# Patient Record
Sex: FEMALE | Race: WHITE | NOT HISPANIC OR LATINO | Employment: OTHER | URBAN - METROPOLITAN AREA
[De-identification: names, ages, dates, MRNs, and addresses within clinical notes are randomized per-mention and may not be internally consistent; named-entity substitution may affect disease eponyms.]

---

## 2019-11-08 ENCOUNTER — LAB VISIT (OUTPATIENT)
Dept: LAB | Facility: HOSPITAL | Age: 84
End: 2019-11-08
Attending: INTERNAL MEDICINE
Payer: MEDICARE

## 2019-11-08 DIAGNOSIS — E03.9 PRIMARY HYPOTHYROIDISM: Primary | ICD-10-CM

## 2019-11-08 DIAGNOSIS — R73.01 IMPAIRED FASTING GLUCOSE: ICD-10-CM

## 2019-11-08 DIAGNOSIS — Z79.899 ENCOUNTER FOR LONG-TERM (CURRENT) USE OF OTHER MEDICATIONS: ICD-10-CM

## 2019-11-08 DIAGNOSIS — M25.50 PAIN IN JOINT, MULTIPLE SITES: ICD-10-CM

## 2019-11-08 DIAGNOSIS — R53.83 FATIGUE: ICD-10-CM

## 2019-11-08 DIAGNOSIS — E78.5 HYPERLIPEMIA: ICD-10-CM

## 2019-11-08 LAB
ALBUMIN SERPL BCP-MCNC: 4.2 G/DL (ref 3.5–5.2)
ALP SERPL-CCNC: 53 U/L (ref 55–135)
ALT SERPL W/O P-5'-P-CCNC: 16 U/L (ref 10–44)
ANION GAP SERPL CALC-SCNC: 11 MMOL/L (ref 8–16)
AST SERPL-CCNC: 22 U/L (ref 10–40)
BASOPHILS # BLD AUTO: 0.04 K/UL (ref 0–0.2)
BASOPHILS NFR BLD: 0.8 % (ref 0–1.9)
BILIRUB SERPL-MCNC: 0.9 MG/DL (ref 0.1–1)
BUN SERPL-MCNC: 22 MG/DL (ref 8–23)
CALCIUM SERPL-MCNC: 9.2 MG/DL (ref 8.7–10.5)
CHLORIDE SERPL-SCNC: 102 MMOL/L (ref 95–110)
CHOLEST SERPL-MCNC: 176 MG/DL (ref 120–199)
CHOLEST/HDLC SERPL: 3 {RATIO} (ref 2–5)
CO2 SERPL-SCNC: 24 MMOL/L (ref 23–29)
CREAT SERPL-MCNC: 1.3 MG/DL (ref 0.5–1.4)
DIFFERENTIAL METHOD: ABNORMAL
EOSINOPHIL # BLD AUTO: 0.1 K/UL (ref 0–0.5)
EOSINOPHIL NFR BLD: 1.3 % (ref 0–8)
ERYTHROCYTE [DISTWIDTH] IN BLOOD BY AUTOMATED COUNT: 12.8 % (ref 11.5–14.5)
EST. GFR  (AFRICAN AMERICAN): 43.8 ML/MIN/1.73 M^2
EST. GFR  (NON AFRICAN AMERICAN): 38 ML/MIN/1.73 M^2
ESTIMATED AVG GLUCOSE: 111 MG/DL (ref 68–131)
GLUCOSE SERPL-MCNC: 98 MG/DL (ref 70–110)
HBA1C MFR BLD HPLC: 5.5 % (ref 4.5–6.2)
HCT VFR BLD AUTO: 35.3 % (ref 37–48.5)
HDLC SERPL-MCNC: 58 MG/DL (ref 40–75)
HDLC SERPL: 33 % (ref 20–50)
HGB BLD-MCNC: 11.7 G/DL (ref 12–16)
IMM GRANULOCYTES # BLD AUTO: 0.02 K/UL (ref 0–0.04)
IMM GRANULOCYTES NFR BLD AUTO: 0.4 % (ref 0–0.5)
LDLC SERPL CALC-MCNC: 90.6 MG/DL (ref 63–159)
LYMPHOCYTES # BLD AUTO: 1.5 K/UL (ref 1–4.8)
LYMPHOCYTES NFR BLD: 32.6 % (ref 18–48)
MCH RBC QN AUTO: 32.1 PG (ref 27–31)
MCHC RBC AUTO-ENTMCNC: 33.1 G/DL (ref 32–36)
MCV RBC AUTO: 97 FL (ref 82–98)
MONOCYTES # BLD AUTO: 0.5 K/UL (ref 0.3–1)
MONOCYTES NFR BLD: 11 % (ref 4–15)
NEUTROPHILS # BLD AUTO: 2.6 K/UL (ref 1.8–7.7)
NEUTROPHILS NFR BLD: 53.9 % (ref 38–73)
NONHDLC SERPL-MCNC: 118 MG/DL
NRBC BLD-RTO: 0 /100 WBC
PLATELET # BLD AUTO: 253 K/UL (ref 150–350)
PMV BLD AUTO: 9.1 FL (ref 9.2–12.9)
POTASSIUM SERPL-SCNC: 4 MMOL/L (ref 3.5–5.1)
PROT SERPL-MCNC: 7.1 G/DL (ref 6–8.4)
RBC # BLD AUTO: 3.65 M/UL (ref 4–5.4)
SODIUM SERPL-SCNC: 137 MMOL/L (ref 136–145)
T4 FREE SERPL-MCNC: 1.78 NG/DL (ref 0.71–1.51)
TRIGL SERPL-MCNC: 137 MG/DL (ref 30–150)
TSH SERPL DL<=0.005 MIU/L-ACNC: 4.91 UIU/ML (ref 0.34–5.6)
WBC # BLD AUTO: 4.73 K/UL (ref 3.9–12.7)

## 2019-11-08 PROCEDURE — 83036 HEMOGLOBIN GLYCOSYLATED A1C: CPT

## 2019-11-08 PROCEDURE — 84443 ASSAY THYROID STIM HORMONE: CPT

## 2019-11-08 PROCEDURE — 36415 COLL VENOUS BLD VENIPUNCTURE: CPT

## 2019-11-08 PROCEDURE — 84439 ASSAY OF FREE THYROXINE: CPT

## 2019-11-08 PROCEDURE — 80061 LIPID PANEL: CPT

## 2019-11-08 PROCEDURE — 80053 COMPREHEN METABOLIC PANEL: CPT

## 2019-11-08 PROCEDURE — 85025 COMPLETE CBC W/AUTO DIFF WBC: CPT

## 2021-01-15 ENCOUNTER — IMMUNIZATION (OUTPATIENT)
Dept: FAMILY MEDICINE | Facility: CLINIC | Age: 86
End: 2021-01-15
Payer: MEDICARE

## 2021-01-15 DIAGNOSIS — Z23 NEED FOR VACCINATION: Primary | ICD-10-CM

## 2021-01-15 PROCEDURE — 0011A COVID-19, MRNA, LNP-S, PF, 100 MCG/0.5 ML DOSE VACCINE: CPT | Mod: ,,, | Performed by: FAMILY MEDICINE

## 2021-01-15 PROCEDURE — 91301 COVID-19, MRNA, LNP-S, PF, 100 MCG/0.5 ML DOSE VACCINE: CPT | Mod: ,,, | Performed by: FAMILY MEDICINE

## 2021-01-15 PROCEDURE — 91301 COVID-19, MRNA, LNP-S, PF, 100 MCG/0.5 ML DOSE VACCINE: ICD-10-PCS | Mod: ,,, | Performed by: FAMILY MEDICINE

## 2021-01-15 PROCEDURE — 0011A COVID-19, MRNA, LNP-S, PF, 100 MCG/0.5 ML DOSE VACCINE: ICD-10-PCS | Mod: ,,, | Performed by: FAMILY MEDICINE

## 2021-02-12 ENCOUNTER — IMMUNIZATION (OUTPATIENT)
Dept: FAMILY MEDICINE | Facility: CLINIC | Age: 86
End: 2021-02-12
Payer: MEDICARE

## 2021-02-12 DIAGNOSIS — Z23 NEED FOR VACCINATION: Primary | ICD-10-CM

## 2021-02-12 PROCEDURE — 0012A COVID-19, MRNA, LNP-S, PF, 100 MCG/0.5 ML DOSE VACCINE: ICD-10-PCS | Mod: CV19,S$GLB,, | Performed by: FAMILY MEDICINE

## 2021-02-12 PROCEDURE — 91301 COVID-19, MRNA, LNP-S, PF, 100 MCG/0.5 ML DOSE VACCINE: CPT | Mod: S$GLB,,, | Performed by: FAMILY MEDICINE

## 2021-02-12 PROCEDURE — 0012A COVID-19, MRNA, LNP-S, PF, 100 MCG/0.5 ML DOSE VACCINE: CPT | Mod: CV19,S$GLB,, | Performed by: FAMILY MEDICINE

## 2021-02-12 PROCEDURE — 91301 COVID-19, MRNA, LNP-S, PF, 100 MCG/0.5 ML DOSE VACCINE: ICD-10-PCS | Mod: S$GLB,,, | Performed by: FAMILY MEDICINE

## 2023-12-21 ENCOUNTER — TELEPHONE (OUTPATIENT)
Dept: PODIATRY | Facility: CLINIC | Age: 88
End: 2023-12-21
Payer: MEDICARE

## 2023-12-21 ENCOUNTER — OFFICE VISIT (OUTPATIENT)
Dept: PODIATRY | Facility: CLINIC | Age: 88
End: 2023-12-21
Payer: MEDICARE

## 2023-12-21 VITALS
DIASTOLIC BLOOD PRESSURE: 99 MMHG | HEART RATE: 78 BPM | HEIGHT: 66 IN | SYSTOLIC BLOOD PRESSURE: 159 MMHG | WEIGHT: 136 LBS | BODY MASS INDEX: 21.86 KG/M2

## 2023-12-21 DIAGNOSIS — L03.032 CELLULITIS OF TOE OF LEFT FOOT: Primary | ICD-10-CM

## 2023-12-21 DIAGNOSIS — M67.40 GANGLION: ICD-10-CM

## 2023-12-21 PROCEDURE — 99213 OFFICE O/P EST LOW 20 MIN: CPT | Mod: PBBFAC,PN | Performed by: PODIATRIST

## 2023-12-21 PROCEDURE — 99999 PR PBB SHADOW E&M-EST. PATIENT-LVL III: CPT | Mod: PBBFAC,,, | Performed by: PODIATRIST

## 2023-12-21 PROCEDURE — 99203 PR OFFICE/OUTPT VISIT, NEW, LEVL III, 30-44 MIN: ICD-10-PCS | Mod: S$PBB,,, | Performed by: PODIATRIST

## 2023-12-21 PROCEDURE — 99203 OFFICE O/P NEW LOW 30 MIN: CPT | Mod: S$PBB,,, | Performed by: PODIATRIST

## 2023-12-21 PROCEDURE — 99999 PR PBB SHADOW E&M-EST. PATIENT-LVL III: ICD-10-PCS | Mod: PBBFAC,,, | Performed by: PODIATRIST

## 2023-12-21 RX ORDER — WARFARIN SODIUM 5 MG/1
5 TABLET ORAL
COMMUNITY
Start: 2023-07-26

## 2023-12-21 RX ORDER — GABAPENTIN 300 MG/1
300 CAPSULE ORAL
COMMUNITY
Start: 2023-10-24

## 2023-12-21 RX ORDER — SULFAMETHOXAZOLE AND TRIMETHOPRIM 800; 160 MG/1; MG/1
1 TABLET ORAL DAILY
Qty: 10 TABLET | Refills: 0 | Status: SHIPPED | OUTPATIENT
Start: 2023-12-21 | End: 2023-12-31

## 2023-12-21 RX ORDER — LEVOTHYROXINE SODIUM 75 UG/1
75 TABLET ORAL
COMMUNITY

## 2023-12-21 RX ORDER — VALSARTAN AND HYDROCHLOROTHIAZIDE 320; 25 MG/1; MG/1
TABLET, FILM COATED ORAL
COMMUNITY
Start: 2023-11-27

## 2023-12-21 RX ORDER — METOPROLOL SUCCINATE 100 MG/1
100 TABLET, EXTENDED RELEASE ORAL 2 TIMES DAILY
COMMUNITY
Start: 2023-12-05

## 2023-12-21 RX ORDER — CLONIDINE HYDROCHLORIDE 0.1 MG/1
0.1 TABLET ORAL 2 TIMES DAILY
COMMUNITY

## 2023-12-21 NOTE — TELEPHONE ENCOUNTER
[facility] pharmacy calling to make sure you are aware bactrim's interaction with patient's warfarin.

## 2023-12-21 NOTE — TELEPHONE ENCOUNTER
M for prescribers at [facility] stating that Dr. Byrd is aware patient is on both medications and that she still needs to take her antibiotic.    Spoke with patient and informed her she needs to take antibiotic and that we communicated this with [facility].

## 2023-12-25 NOTE — PROGRESS NOTES
Subjective:       Patient ID: Sola Pitt is a 88 y.o. female.    Chief Complaint: Swelling (Left foot 2nd toe), Lump (Left foot, 2nd toe), and Rash (Right foot )    Patient presents today with a red swollen 2nd digit on the left foot she states she also has a lump on her foot that is been there for an extended period of time and has not significantly bothered her.  Patient also has a rash on the top of her right foot from shoes that rubbed and irritated the top of her foot.  Past Medical History:   Diagnosis Date    Atrial fibrillation     Cellulitis     Hypertension     Thyroid disease      History reviewed. No pertinent surgical history.  History reviewed. No pertinent family history.  Social History     Socioeconomic History    Marital status:    Tobacco Use    Smoking status: Never     Passive exposure: Never    Smokeless tobacco: Never   Substance and Sexual Activity    Alcohol use: Yes    Drug use: Never    Sexual activity: Not Currently       Current Outpatient Medications   Medication Sig Dispense Refill    cloNIDine (CATAPRES) 0.1 MG tablet Take 0.1 mg by mouth 2 (two) times daily.      gabapentin (NEURONTIN) 300 MG capsule 300 mg.      levothyroxine (SYNTHROID) 75 MCG tablet Take 75 mcg by mouth.      metoprolol succinate (TOPROL-XL) 100 MG 24 hr tablet Take 100 mg by mouth 2 (two) times daily.      valsartan-hydrochlorothiazide (DIOVAN-HCT) 320-25 mg per tablet 1 tab, Oral, Daily, # 90 tab, 3 Refill(s), Pharmacy: [id number], 167, cm, 11/06/23 10:35:00 CST, Height/Length Measured, 71.3, kg, 11/06/23 10:35:00 CST, Weight Dosing      warfarin (COUMADIN) 5 MG tablet Take 5 mg by mouth.      sulfamethoxazole-trimethoprim 800-160mg (BACTRIM DS) 800-160 mg Tab Take 1 tablet by mouth once daily. for 10 days 10 tablet 0     No current facility-administered medications for this visit.     Review of patient's allergies indicates:  No Known Allergies    Review of Systems   Musculoskeletal:   "Positive for arthralgias and joint swelling.   Skin:  Positive for color change and rash.   All other systems reviewed and are negative.      Objective:      Vitals:    12/21/23 1405   BP: (!) 159/99   BP Location: Right arm   Patient Position: Sitting   Pulse: 78   Weight: 61.7 kg (136 lb)   Height: 5' 6" (1.676 m)     Physical Exam  Vitals and nursing note reviewed.   Constitutional:       Appearance: Normal appearance.   Cardiovascular:      Pulses:           Dorsalis pedis pulses are 1+ on the right side and 1+ on the left side.        Posterior tibial pulses are 1+ on the right side and 1+ on the left side.   Pulmonary:      Effort: Pulmonary effort is normal.   Musculoskeletal:         General: Swelling, tenderness and deformity present.      Left foot: Decreased range of motion. Deformity present.        Feet:    Feet:      Right foot:      Protective Sensation: 2 sites tested.  2 sites sensed.      Skin integrity: Erythema present.      Left foot:      Protective Sensation: 2 sites tested.  2 sites sensed.      Skin integrity: Erythema and warmth present.   Skin:     Capillary Refill: Capillary refill takes more than 3 seconds.      Findings: Erythema present.   Neurological:      General: No focal deficit present.      Mental Status: She is alert.   Psychiatric:         Mood and Affect: Mood normal.         Behavior: Behavior normal.                                Assessment:       1. Cellulitis of toe of left foot    2. Ganglion        Plan:        Patient presents today with a red swollen 2nd digit on the left foot she states she also has a lump on her foot that is been there for an extended period of time and has not significantly bothered her.  Patient also has a rash on the top of her right foot from shoes that rubbed and irritated the top of her foot.  A comprehensive new patient evaluation was performed patient does have an abrasion on the top of her right foot this is something that I want her to " monitor this was caused by a shoe rubbing and irritating the area obviously she needs to avoid anything that rubs or irritates the area to allow this to heal completely.  I evaluated the patient's left foot she does have significant cellulitis of the 2nd digit with associated erythema edema there has a small ganglionic cyst on the dorsal aspect of the 2nd digit left the patient indicates this has been here for an extended period of time there are no skin breaks no signs of ulceration noted involving the 2nd digit however I advised the patient it is difficult to determine whether not this is inflammatory or infectious I advised the patient to be cautious I am going to put her on a renal dose of Bactrim to get this settled down she needs to make sure she keeps the 2nd digit protected prevent anything from rubbing or irritating it I am going to see her for follow-up in 2 weeks and would recommend ice and elevation of the left foot.  Patient advised if for any reason this gets worse she is to contact us immediately.  Patient was in understanding and agreement she agreed to proceed conservatively with a dressing the problem on the left foot.This note was created using [employer] voice recognition software that occasionally misinterpreted phrases or words.

## 2024-01-04 ENCOUNTER — OFFICE VISIT (OUTPATIENT)
Dept: PODIATRY | Facility: CLINIC | Age: 89
End: 2024-01-04
Payer: MEDICARE

## 2024-01-04 VITALS
BODY MASS INDEX: 21.86 KG/M2 | WEIGHT: 136 LBS | SYSTOLIC BLOOD PRESSURE: 128 MMHG | HEIGHT: 66 IN | DIASTOLIC BLOOD PRESSURE: 81 MMHG | HEART RATE: 63 BPM

## 2024-01-04 DIAGNOSIS — M67.40 GANGLION: ICD-10-CM

## 2024-01-04 DIAGNOSIS — L03.032 CELLULITIS OF TOE OF LEFT FOOT: Primary | ICD-10-CM

## 2024-01-04 PROCEDURE — 99213 OFFICE O/P EST LOW 20 MIN: CPT | Mod: S$PBB,,, | Performed by: PODIATRIST

## 2024-01-04 PROCEDURE — 99213 OFFICE O/P EST LOW 20 MIN: CPT | Mod: PBBFAC,PN | Performed by: PODIATRIST

## 2024-01-04 PROCEDURE — 99999 PR PBB SHADOW E&M-EST. PATIENT-LVL III: CPT | Mod: PBBFAC,,, | Performed by: PODIATRIST

## 2024-01-07 NOTE — PROGRESS NOTES
"Subjective:       Patient ID: Sola Pitt is a 88 y.o. female.    Chief Complaint: Cellulitis (Left foot /) and Follow-up    Patient presents today for follow-up of a red swollen 2nd digit on the left foot   Past Medical History:   Diagnosis Date    Atrial fibrillation     Cellulitis     Hypertension     Thyroid disease      History reviewed. No pertinent surgical history.  History reviewed. No pertinent family history.  Social History     Socioeconomic History    Marital status:    Tobacco Use    Smoking status: Never     Passive exposure: Never    Smokeless tobacco: Never   Substance and Sexual Activity    Alcohol use: Yes    Drug use: Never    Sexual activity: Not Currently       Current Outpatient Medications   Medication Sig Dispense Refill    cloNIDine (CATAPRES) 0.1 MG tablet Take 0.1 mg by mouth 2 (two) times daily.      gabapentin (NEURONTIN) 300 MG capsule 300 mg.      levothyroxine (SYNTHROID) 75 MCG tablet Take 75 mcg by mouth.      metoprolol succinate (TOPROL-XL) 100 MG 24 hr tablet Take 100 mg by mouth 2 (two) times daily.      valsartan-hydrochlorothiazide (DIOVAN-HCT) 320-25 mg per tablet 1 tab, Oral, Daily, # 90 tab, 3 Refill(s), Pharmacy: [id number], 167, cm, 11/06/23 10:35:00 CST, Height/Length Measured, 71.3, kg, 11/06/23 10:35:00 CST, Weight Dosing      warfarin (COUMADIN) 5 MG tablet Take 5 mg by mouth.       No current facility-administered medications for this visit.     Review of patient's allergies indicates:  No Known Allergies    Review of Systems   Musculoskeletal:  Positive for arthralgias and joint swelling.   Skin:  Positive for color change and rash.   All other systems reviewed and are negative.      Objective:      Vitals:    01/04/24 0934   BP: 128/81   BP Location: Right arm   Patient Position: Sitting   Pulse: 63   Weight: 61.7 kg (136 lb)   Height: 5' 6" (1.676 m)     Physical Exam  Vitals and nursing note reviewed.   Constitutional:       Appearance: " Normal appearance.   Cardiovascular:      Pulses:           Dorsalis pedis pulses are 1+ on the right side and 1+ on the left side.        Posterior tibial pulses are 1+ on the right side and 1+ on the left side.   Pulmonary:      Effort: Pulmonary effort is normal.   Musculoskeletal:         General: Swelling, tenderness and deformity present.      Left foot: Decreased range of motion. Deformity present.        Feet:    Feet:      Right foot:      Protective Sensation: 2 sites tested.  2 sites sensed.      Skin integrity: Erythema present.      Left foot:      Protective Sensation: 2 sites tested.  2 sites sensed.      Skin integrity: Erythema and warmth present.   Skin:     Capillary Refill: Capillary refill takes more than 3 seconds.      Findings: Erythema present.   Neurological:      General: No focal deficit present.      Mental Status: She is alert.   Psychiatric:         Mood and Affect: Mood normal.         Behavior: Behavior normal.                                          Assessment:       1. Cellulitis of toe of left foot    2. Ganglion        Plan:       Patient presents today for follow-up of a red swollen 2nd digit on the left foot.  Patient states within 4-5 days of her last appointment and starting the Bactrim the areas started to improve it is now completely resolved there has no erythema no edema no discomfort in the 2nd digit left.  Patient still has a small ganglionic cyst on the dorsal distal interphalangeal joint 2nd digit left but the patient states this has been there for years has never bothered her certainly she obviously had signs of infection but this has now subsequently resolved I did advised the patient any problems questions concerns or recurrence she should contact us immediately otherwise I will plan to see her as needed for follow-up.  This note was created using [employer] voice recognition software that occasionally misinterpreted phrases or words.

## 2024-03-07 DIAGNOSIS — S72.142A CLOSED INTERTROCHANTERIC FRACTURE OF LEFT FEMUR: ICD-10-CM

## 2024-03-07 DIAGNOSIS — S72.142D CLOSED DISPLACED INTERTROCHANTERIC FRACTURE OF LEFT FEMUR WITH ROUTINE HEALING: Primary | ICD-10-CM

## 2024-03-07 DIAGNOSIS — S72.142A CLOSED INTERTROCHANTERIC FRACTURE OF LEFT FEMUR: Primary | ICD-10-CM

## 2024-03-07 DIAGNOSIS — S72.142D CLOSED DISPLACED INTERTROCHANTERIC FRACTURE OF LEFT FEMUR WITH ROUTINE HEALING: ICD-10-CM

## 2024-03-13 ENCOUNTER — CLINICAL SUPPORT (OUTPATIENT)
Dept: REHABILITATION | Facility: HOSPITAL | Age: 89
End: 2024-03-13
Payer: MEDICARE

## 2024-03-13 DIAGNOSIS — S72.142D CLOSED DISPLACED INTERTROCHANTERIC FRACTURE OF LEFT FEMUR WITH ROUTINE HEALING, SUBSEQUENT ENCOUNTER: Primary | ICD-10-CM

## 2024-03-13 DIAGNOSIS — R29.898 DECREASED STRENGTH OF LOWER EXTREMITY: ICD-10-CM

## 2024-03-13 DIAGNOSIS — M25.652 DECREASED RANGE OF LEFT HIP MOVEMENT: ICD-10-CM

## 2024-03-13 DIAGNOSIS — S72.142A CLOSED DISPLACED INTERTROCHANTERIC FRACTURE OF LEFT FEMUR, INITIAL ENCOUNTER: ICD-10-CM

## 2024-03-13 DIAGNOSIS — S72.142D CLOSED DISPLACED INTERTROCHANTERIC FRACTURE OF LEFT FEMUR WITH ROUTINE HEALING: Primary | ICD-10-CM

## 2024-03-13 PROCEDURE — 97110 THERAPEUTIC EXERCISES: CPT | Mod: PN

## 2024-03-13 PROCEDURE — 97165 OT EVAL LOW COMPLEX 30 MIN: CPT | Mod: PN

## 2024-03-13 PROCEDURE — 97161 PT EVAL LOW COMPLEX 20 MIN: CPT | Mod: PN

## 2024-03-13 NOTE — PLAN OF CARE
[facility]  Physical Therapy Initial Evaluation    Name: Pedrito Pitt  Clinic Number: [id number]    Therapy Diagnosis:   Encounter Diagnoses   Name Primary?    Closed displaced intertrochanteric fracture of left femur with routine healing Yes    Decreased strength of lower extremity     Decreased range of left hip movement     Closed displaced intertrochanteric fracture of left femur, initial encounter      Physician: Kit Vasquez MD    Physician Orders: PT Eval and Treat   Medical Diagnosis from Referral:   S72.142A (ICD-10-CM) - Closed intertrochanteric fracture of left femur   S72.142D (ICD-10-CM) - Closed displaced intertrochanteric fracture of left femur with routine healing     Evaluation Date: 3/13/2024  Authorization Period Expiration: 3/7/25  Plan of Care Expiration: 5/10/24  Visit # / Visits authorized: 1/ 1  FOTO: 1/10    Time In: 2:00  Time Out: 2:50  Total Billable Time: 50 minutes (low Complexity Evaluation, Therapeutic Exercise - 1)    Precautions: Standard, Fall    Subjective   Date of onset: 2/24/24  History of current condition - PEDRITO reports: s/p left hip ORIF displaced intertrochanteric fracture after falling out of a chair at the [employer]. Course complicated by acute renal insufficiency which improved with IV. Participated in rehab at [facility] discharged last Saturday. WBAT with rolling walker. She also has a wheelchair, raised toilet and shower seat. Sleeping fairly good in a bed. Today she is hurting more. She has left knee and lateral hip pain. Pain with transitional movements, walking, dressing. Patient continues to have bruising. On coumadin.      Medical History:   Past Medical History:   Diagnosis Date    Atrial fibrillation     Cellulitis     Hypertension     Thyroid disease        Surgical History:   Pedrito Pitt  has no past surgical history on file.    Medications:   Pedrito has a current medication list which includes the following  prescription(s): clonidine, gabapentin, levothyroxine, metoprolol succinate, valsartan-hydrochlorothiazide, and warfarin.    Allergies:   Review of patient's allergies indicates:  No Known Allergies     Imaging, : not available (outside source)    Prior Therapy: rehab facility PT/OT   Social History:  lives with their daughter currently, lives in [place], does have stairs and step down into living room in [place] home  Occupation: not working  Prior Level of Function: independent with ALDs no assistive device, social activities like bridge   Current Level of Function: requires min assistance     Pain:   Current 6/10, worst 8/10, best 4/10   Location: left hip   Description: Sharp  Aggravating Factors: Standing, Laying, Walking, and Getting out of bed/chair  Easing Factors: pain medication    Pts goals: be able to drive and return to [place]    Objective     Posture: slight trunk flexion, decreased knee extension   Palpation: moderate bruising on lower extremity, swelling in lower leg   Sensation: normal    Range of Motion/Strength:     Hip Right Left Pain/Dysfunction with Movement   AROM/PROM      flexion  95  60 supine/ 90 seated     extension  Not tested   Not tested     abduction  50  30    Internal rotation  40  40    External rotation  40  40      Knee Right Left Pain/Dysfunction with Movement   AROM/PROM      flexion  128  90 Left knee crepitus    extension  5  16/10        L/E MMT Right Left Pain/Dysfunction with Movement   Hip Flexion 5/5 3/5    Hip Extension 3/5 3/5  Able to perform sit to stand with use of hands    Hip Abduction 4+/5 3-/5    Hip Adduction 5/5 4+/5    Hip ER 4+/5 3/5    Knee Flexion 4+/5 4/5    Knee Extension 5/5 4/5 Left knee pop   Ankle DF 5/5 5/5    Ankle PF 4+/5 4+/5        Joint Mobility:   Hip: not tested   Knee: decreased patellar mobility all directions left       Flexibility: 30 deg straight leg raise left, within normal limits right     Gait Analysis:With AD.  Device  Used -  Rolling walker Assistance ind Deviations: limited hip extension and step length, decreased hip flexion and knee flexion    TU.5  seconds rolling walker   TUG Cutoff Scores:13.5          CMS Impairment/Limitation/Restriction for FOTO hip Survey    Therapist reviewed FOTO scores for Pedrito Pitt on 3/13/2024.   FOTO documents entered into [employer] - see Media section.    Limitation Score: 64%  Category: Mobility         TREATMENT   Treatment Time In: 2:40  Treatment Time Out: 2:50  Total Treatment time separate from Evaluation: 10 minutes    PEDRITO received therapeutic exercises to develop strength, endurance, ROM, flexibility, posture, and core stabilization for 10 minutes including:  Quad set  Heel slides  Hamstring stretch  Heel raises  Seated/standing marches    Home Exercises Provided and Patient Education Provided     Education provided:   Anatomy and Pathology.  Symptom management and plan of care progressions.  Home Exercise Program.  Provided thigh high compression stockings     Written Home Exercises Provided: Patient instructed to cont prior HEP.  Exercises were reviewed and PEDRITO was able to demonstrate them prior to the end of the session.  PEDRITO demonstrated good  understanding of the education provided.     See EMR under Patient Instructions for exercises provided prior visit.      Assessment   Pedrito is a 88 y.o. female referred to outpatient Physical Therapy with a medical diagnosis of Closed displaced intertrochanteric fracture of left femur with routine healing. Pt presents 2 weeks 4 days s/p left  hip ORIF ambulating with rolling walker with decreased left hip and knee range of motion, decreased lower extremity strength, impaired balance and gait, and decreased functional mobility. Additional signs and symptoms of left knee osteoarthritis. These impairments impact pts ability to stand, walk, and perform independent activities of daily living as well as stair ambulation  required in her home in [place]. She will benefit from skilled PT to address the above impairments to improve her functional mobility for goal of independent return home at prior level of function. Patient demonstrates good motivation to achieve goals.     Pt prognosis is Good.   Pt will benefit from skilled outpatient Physical Therapy to address the deficits stated above and in the chart below, provide pt/family education, and to maximize pt's level of independence.     Plan of care discussed with patient: Yes  Pt's spiritual, cultural and educational needs considered and patient is agreeable to the plan of care and goals as stated below:     Anticipated Barriers for therapy: age, co-morbidities    Medical Necessity is demonstrated by the following  History  Co-morbidities and personal factors that may impact the plan of care [] LOW: no personal factors / co-morbidities  [x] MODERATE: 1-2 personal factors / co-morbidities  [] HIGH: 3+ personal factors / co-morbidities    Moderate / High Support Documentation:   Co-morbidities affecting plan of care: afib, hypertension, cellulitis. Thyroid disease    Personal Factors:   age  lifestyle     Examination  Body Structures and Functions, activity limitations and participation restrictions that may impact the plan of care [x] LOW: addressing 1-2 elements  [] MODERATE: 3+ elements  [] HIGH: 4+ elements (please support below)    Moderate / High Support Documentation: strength, rom, balance, gait, transitions, transfers, motor control     Clinical Presentation [x] LOW: stable  [] MODERATE: Evolving  [] HIGH: Unstable     Decision Making/ Complexity Score: low         Goals:    Short Term Goals (3 Weeks):  1. Pt will be compliant with HEP to supplement PT in restoring pain free function.  2. Pt will improve impaired LE MMTs to >/= 4/5 to improve dynamic hip/knee support for functional tasks.  3. Patient will be able to perform active hip flexion to 90 deg to improve  transitional movements.   4. Patient will be able to ambulate 150 ft to improve community ambulation.   5. Patient will improve HOOS Jr score by 10% to improve functional mobility.     Long Term Goals (6 Weeks):  1. Pt will improve FOTO score to </= 40% limited to decrease perceived limitation with mobility.   2. Pt will improve impaired LE MMTs to >/= 4+/5 to improve dynamic hip/knee support for functional tasks.  3. Pt will improve knee flexion ROM to 0-115 deg to improve mobility for functional tasks.  4. Patient will be able to ambulate up/down 5 stairs 3x in order to simulate stair navigation in home.   5. Pt will complete 6mwt with least restrictive assistive device to promote community ambulation.      Plan   Plan of care Certification: 3/13/2024 to 4/26/24.    Outpatient Physical Therapy 3 times weekly for 2 weeks then 2x weekly for 4 weeks to include the following interventions: Electrical Stimulation , Gait Training, Manual Therapy, Moist Heat/ Ice, Neuromuscular Re-ed, Patient Education, Therapeutic Activities, and Therapeutic Exercise, ASTYM, Kinesiotaping PRN, Functional Dry Needling    ANUJ LOZANO, PT, DPT

## 2024-03-13 NOTE — PLAN OF CARE
[facility]   Occupational Therapy Initial Neurological Evaluation     Name: Sola Pitt  Clinic Number: [id number]    Therapy Diagnosis:   Encounter Diagnosis   Name Primary?    Closed displaced intertrochanteric fracture of left femur with routine healing, subsequent encounter Yes     Physician: Kit Vasquez MD    Physician Orders: Eval and Treat  Medical Diagnosis: S72.142A (ICD-10-CM) - Closed intertrochanteric fracture of left femur S72.142D (ICD-10-CM) - Closed displaced intertrochanteric fracture of left femur with routine healing  Evaluation Date: 3/13/2024  Insurance Authorization Period Expiration: 12/31/2024  Plan of Care Certification Period: 3/13/2024  Visit # / Visits authorized: 1 / 1  FOTO: 1/ 3    Precautions:  Standard and Fall    Time In: 4:00 pm   Time Out: 4:20 pm   Total Billable Time: 20  minutes    Subjective     Date of Onset: 2/23/2024    History of Current Condition: Sola reports [place] 2[place]23[place] on her hip. She does not report any upper extremity pain or weakness and states she is completing all activities of daily living with mod I, her daughter will supervise bathing for safety.     Involved Side: left leg   Dominant Side: Right    Surgical Procedure: ORIF     Previous Therapy: none     Pain:  Pain Related Behaviors Observed: No   Functional Pain Scale Rating 0-10:   6/10 on average  4/10 at best  8/10 at worst  Location: left hip/knee   Description: Sharp  Aggravating Factors: sitting/staying in one position for too long   Easing Factors:  pain medications     Occupation:  retired     Functional Limitations/Social History:    Prior Level of Function: Independent with all ADLs.   Current Level of Function: mod I, daughter present if needed     Current Functional Status   Home/Living environment: lives with their daughter currently, plans to return home to live independently     - single story home, 0 steps to  "enter    - DME: Manual wheelchair, Rollator, BSC, and Shower chair       Limitation of Functional Status as follows:   ADLs/IADLs:     - Feeding: mod I     - Bathing: mod I     - Dressing: mod I   - Grooming: mod     - Home Management: mod I     - Driving: driving prior to the fall     Leisure: play bridge, being social with her friends, going to lunch/movies    Patient's Goals for Therapy: "I want to get back to driving and being self sufficient."     Past Medical History/Physical Systems Review:     Past Medical History:  Sola Pitt  has a past medical history of Atrial fibrillation, Cellulitis, Hypertension, and Thyroid disease.    Past Surgical History:  Sola Pitt  has no past surgical history on file.    Current Medications:  Sola has a current medication list which includes the following prescription(s): clonidine, gabapentin, levothyroxine, metoprolol succinate, valsartan-hydrochlorothiazide, and warfarin.    Allergies:  Review of patient's allergies indicates:  No Known Allergies     Objective       Cognitive Exam:  Oriented Person, Place, Time, and Situation   Behaviors normal, cooperative, friendly and cooperative   Follows Commands/attention: Follows multistep  commands   Communication clear/fluent   Memory No Deficits noted    Safety awareness/insight to disability aware of diagnosis, treatment, and prognosis   Coping skills/emotional control Appropriate to situation     Visual/Perceptual:  Comments: wears reading glasses , does not report any change in vision since fall     Physical Exam:  Postural examination/scapula alignment: normal posture   Joint integrity: intact   Skin integrity: intact   bilateral active range of motion and manual muscle test within normal limits.    Joint Evaluation  [facility]   3/13/2024 [facility]   3/13/2024    Right Left   Scapular Elevation 5/5 5/5   Scapular Retraction 5/5 5/5   Shoulder Flex 0-180 5/5 5/5   Shoulder Extension 0-80 5/5 5/5   Shoulder Abd 0-180 5/5 5/5 "   Shoulder ER 0-90 5/5 5/5   Shoulder IR 0-90 5/5 5/5   Shoulder Horizontal adduction 0-90 5/5 5/5   Elbow Flexion 0-150 5/5 5/5   Elbow Extension 5/5 5/5   Wrist Flex 0-80 5/5 5/5   Wrist Ext 0-70 5/5 5/5   Supination 0-80 5/5 5/5   Pronation 0-80 5/5 5/5   *WNL - Within Normal Limits  *NT = Not Tested    Fist: normal     Strength: (INDIRA Dynamometer in lbs.) Average 3 trials, Position II:     3/13/2024 3/13/2024   Rung II Right Left   Trial 1 45# 47#   Trial 2 44# 50#   Trial 3 45# 49#   Average 44.6# 48.6#     Normal  Average Values  Female Right Left Male: Right Left   20-29 66 lbs 62 lbs         94 lbs 86 lbs   30-39 68 lbs 64 lbs 90 lbs 82 lbs   40-49 64 lbs 62 lbs 80 lbs 74 lbs   50-59 62 lbs 57 lbs 72 lbs 65 lbs   60-69 53 lbs 51 lbs 63 lbs 56 lbs   70+ 44 lbs 42 lbs 54 lbs 48 lbs   SD = +/- 19lbs       Pinch Strength (Measured in lbs)     3/13/2024 3/13/2024    Right Left   Key Pinch 12 # 11 #   3pt Pinch 15 # 13 #   2pt Pinch 11 # 10 #       Fine/Gross Motor Coordination    Assessment  Right   3/13/2024 Left  3/13/2024   CRISTY (Rapid Alternating Movements)    intact   intact   Finger to Nose (5 times)  intact intact   Finger Flicks (coordination moving from digit flexion to digit extension)  intact intact   *WNL - Within Normal Limits  *NT = Not Tested            Intake Outcome Measure for FOTO Survey    Therapist reviewed FOTO scores for Sola Pitt on 3/13/2024.   FOTO report - see Media section or FOTO account episode details.    Intake Score: 33%  Hip          Home Exercises and Patient Education Provided     Education provided:   -role of OT, goals for Occupational Therapy, eval only     Assessment     Sola Pitt is a 88 y.o. female referred to outpatient occupational therapy and presents with a medical diagnosis of S72.142A (ICD-10-CM) - Closed intertrochanteric fracture of left femur S72.142D (ICD-10-CM) - Closed displaced intertrochanteric fracture of left femur with routine healing.  During evaluation, she reports she is completing all self care tasks with mod I and occasional SUP from her daughter PRN. Her manual muscle strength, active range of motion,  and pinch strength for bilateral upper extremities are within normal limits for her age and gender. At this time, skilled Occupational Therapy services are not required and she will continue with physical therapy to address lower extremity pain, weakness and balance. Pt is agreeable to eval only and will notify MD if Occupational Therapy is warranted in the future. Thank you for your referral.       Anticipated barriers to occupational therapy: none     Plan of care discussed with patient: Yes  Patient's spiritual, cultural and educational needs considered and patient is agreeable to the plan of care and goals as stated below:     Medical Necessity is demonstrated by the following  Occupational Profile/History  Co-morbidities and personal factors that may impact the plan of care [x] LOW: Brief chart review  [] MODERATE: Expanded chart review   [] HIGH: Extensive chart review       Examination  Performance deficits relating to physical, cognitive or psychosocial skills that result in activity limitations and/or participation restrictions  [x] LOW: addressing 1-3 Performance deficits  [] MODERATE: 3-5 Performance deficits  [] HIGH: 5+ Performance deficits (please support below)    Moderate / High Support Documentation:    Physical:  No Deficits    Cognitive:  No Deficits    Psychosocial:    No Deficits     Treatment Options [x] LOW: Limited options  [] MODERATE: Several options  [] HIGH: Multiple options      Decision Making/ Complexity Score: low         Plan   Certification Period/Plan of care expiration: 3/13/2024 to 3/13/2024.    Eval only/one time treatment: Pt would not benefit from skilled occupational therapy services at this time. Pt demonstrates good understanding of all education topics and HEP to be performed independently at  home.     Corinne Rapier, OT        Physician's Signature: _________________________________________ Date: ________________

## 2024-03-18 ENCOUNTER — CLINICAL SUPPORT (OUTPATIENT)
Dept: REHABILITATION | Facility: HOSPITAL | Age: 89
End: 2024-03-18
Payer: MEDICARE

## 2024-03-18 DIAGNOSIS — M25.652 DECREASED RANGE OF LEFT HIP MOVEMENT: ICD-10-CM

## 2024-03-18 DIAGNOSIS — R29.898 DECREASED STRENGTH OF LOWER EXTREMITY: Primary | ICD-10-CM

## 2024-03-18 PROCEDURE — 97110 THERAPEUTIC EXERCISES: CPT | Mod: PN

## 2024-03-18 PROCEDURE — 97140 MANUAL THERAPY 1/> REGIONS: CPT | Mod: PN

## 2024-03-18 NOTE — PROGRESS NOTES
"[facility]   Physical Therapy Treatment Note      Name: Pedrito Pitt  Clinic Number: [id number]    Therapy Diagnosis:   Encounter Diagnoses   Name Primary?    Decreased strength of lower extremity Yes    Decreased range of left hip movement      Physician: Kit Vasquez MD    Visit Date: 3/18/2024  Physician Orders: PT Eval and Treat   Medical Diagnosis from Referral:   S72.142A (ICD-10-CM) - Closed intertrochanteric fracture of left femur   S72.142D (ICD-10-CM) - Closed displaced intertrochanteric fracture of left femur with routine healing      Evaluation Date: 3/13/2024  Authorization Period Expiration: 12/31/24  Plan of Care Expiration: 5/10/24  Visit # / Visits authorized: 1/ 15  FOTO: 2/10  PTA Visit #: 0/5     Time In: 11:00  Time Out:11:55  Total Billable Time: 30 minutes (1 MT, Therapeutic Exercise - 1)     Precautions: Standard, Fall    Subjective     Patient reports: some pain in lateral thigh today.  She was compliant with home exercise program.  Response to previous treatment: eval  Functional change: ongoing    Pain: 5/10  Location: left hip      Objective      Objective Measures updated at progress report unless specified.     Treatment     PEDRITO received the treatments listed below:      therapeutic exercises to develop strength, endurance, ROM, flexibility, posture, and core stabilization for 15 minutes including:  Quad set 20x5"  Heel slides 20x seated  Hamstring stretch seated 3x30"   Heel raises 3x10  Seated/standing marches 2x10   Ambulation [id number] rolling walker   Supine hip abduction 3x10 left on slide board   Long arc quad 3x10 bilateral   Short arc quads 3x10 left  Sit to stand 22in table 3x5      manual therapy techniques: Joint mobilizations were applied to the: left hip for 15 minutes, including:  Hip stretching flexion, abduction, internal rotation/external rotation   STM hip  Knee extension mobilizations  Patellar mobilizations    Patient Education and " Home Exercises       Education provided:   - continue home exercise program     Written Home Exercises Provided: Patient instructed to cont prior HEP. Exercises were reviewed and PEDRITO was able to demonstrate them prior to the end of the session.  PEDRITO demonstrated good  understanding of the education provided. See Electronic Medical Record under Patient Instructions for exercises provided during therapy sessions    Assessment     Pedrito is a 88 y.o. female referred to outpatient Physical Therapy with a medical diagnosis of Closed displaced intertrochanteric fracture of left femur with routine healing. Pt presents 3 weeks 2 days s/p left hip ORIF (2/24/24) ambulating with rolling walker.  Patient with some soreness with hip flexion. Addressed knee mobility deficits as well to improve gait and function with improvement in knee extension noted. Patient fatigued post treatment. Continue to progress as tolerated.     PEDRITO Is progressing well towards her goals.   Patient prognosis is Good.     Patient will continue to benefit from skilled outpatient physical therapy to address the deficits listed in the problem list box on initial evaluation, provide pt/family education and to maximize pt's level of independence in the home and community environment.     Patient's spiritual, cultural and educational needs considered and pt agreeable to plan of care and goals.     Anticipated barriers to physical therapy: age, co-morbidities     Goals:   Short Term Goals (3 Weeks):  1. Pt will be compliant with HEP to supplement PT in restoring pain free function.  2. Pt will improve impaired LE MMTs to >/= 4/5 to improve dynamic hip/knee support for functional tasks.  3. Patient will be able to perform active hip flexion to 90 deg to improve transitional movements.   4. Patient will be able to ambulate 150 ft to improve community ambulation.   5. Patient will improve HOOS Jr score by 10% to improve functional mobility.       Long Term Goals (6 Weeks):  1. Pt will improve FOTO score to </= 40% limited to decrease perceived limitation with mobility.   2. Pt will improve impaired LE MMTs to >/= 4+/5 to improve dynamic hip/knee support for functional tasks.  3. Pt will improve knee flexion ROM to 0-115 deg to improve mobility for functional tasks.  4. Patient will be able to ambulate up/down 5 stairs 3x in order to simulate stair navigation in home.   5. Pt will complete 6mwt with least restrictive assistive device to promote community ambulation.      Plan     Plan of care Certification: 3/13/2024 to 4/26/24.     ANUJ LOZANO, PT

## 2024-03-20 ENCOUNTER — CLINICAL SUPPORT (OUTPATIENT)
Dept: REHABILITATION | Facility: HOSPITAL | Age: 89
End: 2024-03-20
Payer: MEDICARE

## 2024-03-20 DIAGNOSIS — R29.898 DECREASED STRENGTH OF LOWER EXTREMITY: Primary | ICD-10-CM

## 2024-03-20 DIAGNOSIS — M25.652 DECREASED RANGE OF LEFT HIP MOVEMENT: ICD-10-CM

## 2024-03-20 PROCEDURE — 97110 THERAPEUTIC EXERCISES: CPT | Mod: PN

## 2024-03-20 PROCEDURE — 97140 MANUAL THERAPY 1/> REGIONS: CPT | Mod: PN

## 2024-03-20 NOTE — PROGRESS NOTES
"[facility]   Physical Therapy Treatment Note      Name: Pedrito Pitt  Clinic Number: [id number]    Therapy Diagnosis:   Encounter Diagnoses   Name Primary?    Decreased strength of lower extremity Yes    Decreased range of left hip movement      Physician: Kit Vasquez MD    Visit Date: 3/20/2024  Physician Orders: PT Eval and Treat   Medical Diagnosis from Referral:   S72.142A (ICD-10-CM) - Closed intertrochanteric fracture of left femur   S72.142D (ICD-10-CM) - Closed displaced intertrochanteric fracture of left femur with routine healing      Evaluation Date: 3/13/2024  Authorization Period Expiration: 12/31/24  Plan of Care Expiration: 5/10/24  Visit # / Visits authorized: 1/1, 2/15  FOTO: 3/10  PTA Visit #: 0/5     Time In: 10:05 AM  Time Out:11:00 AM  Total Billable Time: 55 minutes (1 MT, Therapeutic Exercise - 3)     Precautions: Standard, Fall    Subjective     Patient reports: mild left lateral hip soreness, using RW to ambulate at home  She was compliant with home exercise program.  Response to previous treatment: eval  Functional change: ongoing    Pain: 4/10  Location: left hip      Objective      Objective Measures updated at progress report unless specified.     Treatment     PEDRITO received the treatments listed below:      therapeutic exercises to develop strength, endurance, ROM, flexibility, posture, and core stabilization for 40 minutes including:  Quad set 20x5", towel under knee  Heel slides 2x15 seated  Hamstring stretch seated 5x20"   Heel raises 3x10 double leg   Standing marches 2x10 with RW  Ambulation [id number] rolling walker, cues for step through gait pattern  Supine hip abduction 3x10 left on slide board   Long arc quad 3x10 bilateral, 1#  Short arc quads 3x10 left  Sit to stand 21in table 2x8    manual therapy techniques: Joint mobilizations were applied to the: left hip for 15 minutes, including:  Hip stretching flexion, abduction, internal " rotation/external rotation   STM hip  Knee extension mobilizations  Patellar mobilizations    Patient Education and Home Exercises       Education provided:   - continue home exercise program     Written Home Exercises Provided: Patient instructed to cont prior HEP. Exercises were reviewed and PEDRTIO was able to demonstrate them prior to the end of the session.  PEDRITO demonstrated good  understanding of the education provided. See Electronic Medical Record under Patient Instructions for exercises provided during therapy sessions    Assessment     Pedrito is a 88 y.o. female referred to outpatient Physical Therapy with a medical diagnosis of Closed displaced intertrochanteric fracture of left femur with routine healing. Pt presents 3 weeks 2 days s/p left hip ORIF (2/24/24) ambulating with rolling walker.   Mild right lateral hip pain with weight bearing activities, and palpable mass at surgical site with edema. +Tender to palpation at left glut med, greater trochanter, and IT band that decreased after manual. Compliant with all activities though very fatigued after session.     PEDRITO Is progressing well towards her goals.   Patient prognosis is Good.     Patient will continue to benefit from skilled outpatient physical therapy to address the deficits listed in the problem list box on initial evaluation, provide pt/family education and to maximize pt's level of independence in the home and community environment.     Patient's spiritual, cultural and educational needs considered and pt agreeable to plan of care and goals.     Anticipated barriers to physical therapy: age, co-morbidities     Goals:   Short Term Goals (3 Weeks):  1. Pt will be compliant with HEP to supplement PT in restoring pain free function.  2. Pt will improve impaired LE MMTs to >/= 4/5 to improve dynamic hip/knee support for functional tasks.  3. Patient will be able to perform active hip flexion to 90 deg to improve transitional  movements.   4. Patient will be able to ambulate 150 ft to improve community ambulation.   5. Patient will improve HOOS Jr score by 10% to improve functional mobility.      Long Term Goals (6 Weeks):  1. Pt will improve FOTO score to </= 40% limited to decrease perceived limitation with mobility.   2. Pt will improve impaired LE MMTs to >/= 4+/5 to improve dynamic hip/knee support for functional tasks.  3. Pt will improve knee flexion ROM to 0-115 deg to improve mobility for functional tasks.  4. Patient will be able to ambulate up/down 5 stairs 3x in order to simulate stair navigation in home.   5. Pt will complete 6mwt with least restrictive assistive device to promote community ambulation.      Plan     Plan of care Certification: 3/13/2024 to 4/26/24.     Christopher An, PT

## 2024-03-25 ENCOUNTER — CLINICAL SUPPORT (OUTPATIENT)
Dept: REHABILITATION | Facility: HOSPITAL | Age: 89
End: 2024-03-25
Payer: MEDICARE

## 2024-03-25 DIAGNOSIS — R29.898 DECREASED STRENGTH OF LOWER EXTREMITY: Primary | ICD-10-CM

## 2024-03-25 DIAGNOSIS — M25.652 DECREASED RANGE OF LEFT HIP MOVEMENT: ICD-10-CM

## 2024-03-25 PROCEDURE — 97110 THERAPEUTIC EXERCISES: CPT | Mod: PN

## 2024-03-25 PROCEDURE — 97140 MANUAL THERAPY 1/> REGIONS: CPT | Mod: PN

## 2024-03-25 NOTE — PROGRESS NOTES
"[facility]   Physical Therapy Treatment Note      Name: Pedrito Pitt  Clinic Number: [id number]    Therapy Diagnosis:   Encounter Diagnoses   Name Primary?    Decreased strength of lower extremity Yes    Decreased range of left hip movement        Physician: Kit Vasquez MD    Visit Date: 3/25/2024  Physician Orders: PT Eval and Treat   Medical Diagnosis from Referral:   S72.142A (ICD-10-CM) - Closed intertrochanteric fracture of left femur   S72.142D (ICD-10-CM) - Closed displaced intertrochanteric fracture of left femur with routine healing      Evaluation Date: 3/13/2024  Authorization Period Expiration: 12/31/24  Plan of Care Expiration: 5/10/24  Visit # / Visits authorized: 1/1, 3/15  FOTO: 4/10  PTA Visit #: 0/5     Time In: 3:05  Time Out:4:00  Total Billable Time: 30 minutes (1 MT, Therapeutic Exercise - 1)     Precautions: Standard, Fall    Subjective     Patient reports:  passed away last week.   She was compliant with home exercise program.  Response to previous treatment: eval  Functional change: ongoing    Pain: 5/10  Location: left hip      Objective      Objective Measures updated at progress report unless specified.     Treatment     PEDRITO received the treatments listed below:      therapeutic exercises to develop strength, endurance, ROM, flexibility, posture, and core stabilization for 15 minutes including:  Quad set 20x5", towel under knee  Heel slides 2x15 supine  Hamstring stretch seated 5x20"   Heel raises 3x10 double leg   Standing marches 2x10 with RW  Ambulation [id number] rolling walker, cues for step through gait pattern  Standing hip abduction 2x10 left   Long arc quad 3x10 bilateral, 1#  Short arc quads 3x10 left  Sit to stand 21in table 2x8  Seated ball squeeze 20x5"      manual therapy techniques: Joint mobilizations were applied to the: left hip for 15 minutes, including:  Hip stretching flexion, abduction, internal rotation/external rotation "   STM hip  Knee extension mobilizations  Patellar mobilizations    Patient Education and Home Exercises       Education provided:   - continue home exercise program     Written Home Exercises Provided: Patient instructed to cont prior HEP. Exercises were reviewed and PEDRITO was able to demonstrate them prior to the end of the session.  PEDRITO demonstrated good  understanding of the education provided. See Electronic Medical Record under Patient Instructions for exercises provided during therapy sessions    Assessment     Pedrito is a 88 y.o. female referred to outpatient Physical Therapy with a medical diagnosis of Closed displaced intertrochanteric fracture of left femur with routine healing. Pt presents 4 weeks s/p left hip ORIF (2/24/24) ambulating with rolling walker.   Persistent bruising gradually improving slowly. Palpable mass at surgical site with edema along with tenderness to left glut med/ greater trochanter. Gradual progression of standing activities with good tolerance. Occasional rest breaks secondary to fatigue. Patient with moderate fatigue post treatment.     PEDRITO Is progressing well towards her goals.   Patient prognosis is Good.     Patient will continue to benefit from skilled outpatient physical therapy to address the deficits listed in the problem list box on initial evaluation, provide pt/family education and to maximize pt's level of independence in the home and community environment.     Patient's spiritual, cultural and educational needs considered and pt agreeable to plan of care and goals.     Anticipated barriers to physical therapy: age, co-morbidities     Goals:   Short Term Goals (3 Weeks):  1. Pt will be compliant with HEP to supplement PT in restoring pain free function.  2. Pt will improve impaired LE MMTs to >/= 4/5 to improve dynamic hip/knee support for functional tasks.  3. Patient will be able to perform active hip flexion to 90 deg to improve transitional movements.    4. Patient will be able to ambulate 150 ft to improve community ambulation. met  5. Patient will improve HOOS Jr score by 10% to improve functional mobility.      Long Term Goals (6 Weeks):  1. Pt will improve FOTO score to </= 40% limited to decrease perceived limitation with mobility.   2. Pt will improve impaired LE MMTs to >/= 4+/5 to improve dynamic hip/knee support for functional tasks.  3. Pt will improve knee flexion ROM to 0-115 deg to improve mobility for functional tasks.  4. Patient will be able to ambulate up/down 5 stairs 3x in order to simulate stair navigation in home.   5. Pt will complete 6mwt with least restrictive assistive device to promote community ambulation.      Plan     Plan of care Certification: 3/13/2024 to 4/26/24.     ANUJ LOZANO, PT

## 2024-03-27 ENCOUNTER — CLINICAL SUPPORT (OUTPATIENT)
Dept: REHABILITATION | Facility: HOSPITAL | Age: 89
End: 2024-03-27
Payer: MEDICARE

## 2024-03-27 DIAGNOSIS — R29.898 DECREASED STRENGTH OF LOWER EXTREMITY: Primary | ICD-10-CM

## 2024-03-27 DIAGNOSIS — M25.652 DECREASED RANGE OF LEFT HIP MOVEMENT: ICD-10-CM

## 2024-03-27 PROCEDURE — 97140 MANUAL THERAPY 1/> REGIONS: CPT | Mod: PN

## 2024-03-27 PROCEDURE — 97110 THERAPEUTIC EXERCISES: CPT | Mod: PN

## 2024-03-27 NOTE — PROGRESS NOTES
"[facility]   Physical Therapy Treatment Note      Name: Pedrito Pitt  Clinic Number: [id number]    Therapy Diagnosis:   Encounter Diagnoses   Name Primary?    Decreased strength of lower extremity Yes    Decreased range of left hip movement        Physician: Kit Vasquez MD    Visit Date: 3/27/2024  Physician Orders: PT Eval and Treat   Medical Diagnosis from Referral:   S72.142A (ICD-10-CM) - Closed intertrochanteric fracture of left femur   S72.142D (ICD-10-CM) - Closed displaced intertrochanteric fracture of left femur with routine healing      Evaluation Date: 3/13/2024  Authorization Period Expiration: 12/31/24  Plan of Care Expiration: 5/10/24  Visit # / Visits authorized: 1/1, 4/15  FOTO: 5/10  PTA Visit #: 0/5     Time In: 9:00  Time Out:9:53  Total Billable Time: 53 minutes (1 MT, Therapeutic Exercise - 3)     Precautions: Standard, Fall    Subjective     Patient reports: just a little pulling today  She was compliant with home exercise program.  Response to previous treatment: some soreness through the night   Functional change: ongoing    Pain: 4/10  Location: left hip      Objective      Objective Measures updated at progress report unless specified.   Left hip flexion: 115  External rotation 40 deg  Internal rotation 20 deg   Treatment     PEDRITO received the treatments listed below:      therapeutic exercises to develop strength, endurance, ROM, flexibility, posture, and core stabilization for 40 minutes including:  Quad set 20x5", towel under knee  Heel slides 2x15 supine  Hamstring stretch seated 4x30"  Heel raises 3x10 double leg   Standing marches 2x10 in // bars   Ambulation [id number] rolling walker, (200 ft then standing rest break before remainder of lap, seated rest break then completion of 3rd lap)   Standing hip abduction 2x10 left   Long arc quad 3x10 bilateral, 2#  Short arc quads 2x15 left  Sit to stand 21in table 2x10  Seated ball squeeze 20x5"  Bridge " 2x10    manual therapy techniques: Joint mobilizations were applied to the: left hip for 15 minutes, including:  Hip stretching flexion, abduction, internal rotation/external rotation   STM hip  Knee extension mobilizations  Patellar mobilizations    Patient Education and Home Exercises       Education provided:   - continue home exercise program     Written Home Exercises Provided: Patient instructed to cont prior HEP. Exercises were reviewed and PEDRITO was able to demonstrate them prior to the end of the session.  PEDRITO demonstrated good  understanding of the education provided. See Electronic Medical Record under Patient Instructions for exercises provided during therapy sessions    Assessment     Pedrito is a 88 y.o. female referred to outpatient Physical Therapy with a medical diagnosis of Closed displaced intertrochanteric fracture of left femur with routine healing. Pt presents 4 weeks + 2 day s/p left hip ORIF (2/24/24) ambulating with rolling walker.   Persistent bruising gradually improving slowly. Palpable mass at surgical site with edema along with tenderness to left glut med/ greater trochanter. Able to increased ambulatory distance today with improving gait pattern with increased heel strike and stride length. Ongoing pain across anterior hip and groin with heel slides. Near full hip flexion range of motion. Occasional rest breaks secondary to fatigue. Patient with moderate fatigue post treatment.     PEDRITO Is progressing well towards her goals.   Patient prognosis is Good.     Patient will continue to benefit from skilled outpatient physical therapy to address the deficits listed in the problem list box on initial evaluation, provide pt/family education and to maximize pt's level of independence in the home and community environment.     Patient's spiritual, cultural and educational needs considered and pt agreeable to plan of care and goals.     Anticipated barriers to physical therapy: age,  co-morbidities     Goals:   Short Term Goals (3 Weeks):  1. Pt will be compliant with HEP to supplement PT in restoring pain free function.  2. Pt will improve impaired LE MMTs to >/= 4/5 to improve dynamic hip/knee support for functional tasks.  3. Patient will be able to perform active hip flexion to 90 deg to improve transitional movements. Met  4. Patient will be able to ambulate 150 ft to improve community ambulation. met  5. Patient will improve HOOS Jr score by 10% to improve functional mobility.      Long Term Goals (6 Weeks):  1. Pt will improve FOTO score to </= 40% limited to decrease perceived limitation with mobility.   2. Pt will improve impaired LE MMTs to >/= 4+/5 to improve dynamic hip/knee support for functional tasks.  3. Pt will improve knee flexion ROM to 0-115 deg to improve mobility for functional tasks.  4. Patient will be able to ambulate up/down 5 stairs 3x in order to simulate stair navigation in home.   5. Pt will complete 6mwt with least restrictive assistive device to promote community ambulation.      Plan     Plan of care Certification: 3/13/2024 to 4/26/24.     ANUJ LOZANO, PT

## 2024-04-01 ENCOUNTER — CLINICAL SUPPORT (OUTPATIENT)
Dept: REHABILITATION | Facility: HOSPITAL | Age: 89
End: 2024-04-01
Payer: MEDICARE

## 2024-04-01 DIAGNOSIS — M25.652 DECREASED RANGE OF LEFT HIP MOVEMENT: ICD-10-CM

## 2024-04-01 DIAGNOSIS — R29.898 DECREASED STRENGTH OF LOWER EXTREMITY: Primary | ICD-10-CM

## 2024-04-01 PROCEDURE — 97116 GAIT TRAINING THERAPY: CPT | Mod: PN

## 2024-04-01 PROCEDURE — 97110 THERAPEUTIC EXERCISES: CPT | Mod: PN

## 2024-04-01 PROCEDURE — 97140 MANUAL THERAPY 1/> REGIONS: CPT | Mod: PN

## 2024-04-01 NOTE — PROGRESS NOTES
"[facility]   Physical Therapy Treatment Note      Name: Pedrito Pitt  Clinic Number: [id number]    Therapy Diagnosis:   Encounter Diagnoses   Name Primary?    Decreased strength of lower extremity Yes    Decreased range of left hip movement        Physician: Kit Vasquez MD    Visit Date: 4/1/2024  Physician Orders: PT Eval and Treat   Medical Diagnosis from Referral:   S72.142A (ICD-10-CM) - Closed intertrochanteric fracture of left femur   S72.142D (ICD-10-CM) - Closed displaced intertrochanteric fracture of left femur with routine healing      Evaluation Date: 3/13/2024  Authorization Period Expiration: 12/31/24  Plan of Care Expiration: 5/10/24  Visit # / Visits authorized: 1/1, 5/15  FOTO: 5/10  PTA Visit #: 0/5     Time In: 9:00  Time Out:9:55  Total Billable Time: 55 minutes (1 MT, Therapeutic Exercise - 2, 1 GT )     Precautions: Standard, Fall    Subjective     Patient reports: no new complaints.   She was compliant with home exercise program.  Response to previous treatment: some soreness through the night   Functional change: able to transition easier in bed.     Pain: 2/10  Location: left hip      Objective      Objective Measures updated at progress report unless specified.   Left hip flexion: 115  External rotation 40 deg  Internal rotation 20 deg   Treatment     PEDRITO received the treatments listed below:      therapeutic exercises to develop strength, endurance, ROM, flexibility, posture, and core stabilization for 32 minutes including:  Quad set 20x5", towel under knee  Heel slides 2x15 supine  Hamstring stretch seated 4x30" NP  Straight leg raise 50% assist left 2x10   Heel raises 3x10 double leg   Standing marches 2x10 in // bars   Standing hip abduction 2x10 left   Long arc quad 3x10 bilateral, 2# NP   Short arc quads 2x10 left 3#   Sit to stand 2x8 standard chair upper extremity assist  Seated ball squeeze 20x5"  Bridge 2x10    PEDRITO participated in gait " training to improve functional mobility and safety for 15  minutes, including:  Ambulation [id number] rolling walker ( 2 consecutive laps)  Gait training with SBQC x 50ft   Step up L1 right 1x8- pelvic drop      manual therapy techniques: Joint mobilizations were applied to the: left hip for 8 minutes, including:  Hip stretching flexion, abduction, internal rotation/external rotation   STM hip  Knee extension mobilizations  Patellar mobilizations    Patient Education and Home Exercises       Education provided:   - continue home exercise program     Written Home Exercises Provided: Patient instructed to cont prior HEP. Exercises were reviewed and PEDRITO was able to demonstrate them prior to the end of the session.  PEDRITO demonstrated good  understanding of the education provided. See Electronic Medical Record under Patient Instructions for exercises provided during therapy sessions    Assessment     Pedrito is a 88 y.o. female referred to outpatient Physical Therapy with a medical diagnosis of Closed displaced intertrochanteric fracture of left femur with routine healing. Pt presents 5 weeks day s/p left hip ORIF (2/24/24) ambulating with rolling walker.   Persistent bruising gradually improving slowly. Palpable mass at surgical site with edema along with tenderness to left glute med/ greater trochanter. Patient requires assistance with straight leg raise. Continuing to work on closed chain gluteal strengthening as pelvic drop observed with single leg activities. Unable to perform left step up and challenged with maintaining left hip stability in order to step up with right as well. Not yet appropriate to return to her home yet. Good sequencing with SBQC but single leg stability is barrier to discharge to cane at this time. Will continue to address before clearing for transition. Patient with appropriate level of fatigue post treatment. Continue to progress as tolerated.     PEDRITO Is progressing well towards  her goals.   Patient prognosis is Good.     Patient will continue to benefit from skilled outpatient physical therapy to address the deficits listed in the problem list box on initial evaluation, provide pt/family education and to maximize pt's level of independence in the home and community environment.     Patient's spiritual, cultural and educational needs considered and pt agreeable to plan of care and goals.     Anticipated barriers to physical therapy: age, co-morbidities     Goals:   Short Term Goals (3 Weeks):  1. Pt will be compliant with HEP to supplement PT in restoring pain free function.  2. Pt will improve impaired LE MMTs to >/= 4/5 to improve dynamic hip/knee support for functional tasks.  3. Patient will be able to perform active hip flexion to 90 deg to improve transitional movements. Met  4. Patient will be able to ambulate 150 ft to improve community ambulation. met  5. Patient will improve HOOS Jr score by 10% to improve functional mobility.      Long Term Goals (6 Weeks):  1. Pt will improve FOTO score to </= 40% limited to decrease perceived limitation with mobility.   2. Pt will improve impaired LE MMTs to >/= 4+/5 to improve dynamic hip/knee support for functional tasks.  3. Pt will improve knee flexion ROM to 0-115 deg to improve mobility for functional tasks.  4. Patient will be able to ambulate up/down 5 stairs 3x in order to simulate stair navigation in home.   5. Pt will complete 6mwt with least restrictive assistive device to promote community ambulation.      Plan     Plan of care Certification: 3/13/2024 to 4/26/24.     ANUJ LOZANO, PT

## 2024-04-03 ENCOUNTER — CLINICAL SUPPORT (OUTPATIENT)
Dept: REHABILITATION | Facility: HOSPITAL | Age: 89
End: 2024-04-03
Payer: MEDICARE

## 2024-04-03 DIAGNOSIS — M25.652 DECREASED RANGE OF LEFT HIP MOVEMENT: ICD-10-CM

## 2024-04-03 DIAGNOSIS — R29.898 DECREASED STRENGTH OF LOWER EXTREMITY: Primary | ICD-10-CM

## 2024-04-03 PROCEDURE — 97112 NEUROMUSCULAR REEDUCATION: CPT | Mod: PN,CQ

## 2024-04-03 PROCEDURE — 97140 MANUAL THERAPY 1/> REGIONS: CPT | Mod: PN,CQ

## 2024-04-03 PROCEDURE — 97110 THERAPEUTIC EXERCISES: CPT | Mod: PN,CQ

## 2024-04-03 NOTE — PROGRESS NOTES
"[facility]   Physical Therapy Treatment Note      Name: Pedrito Pitt  Clinic Number: [id number]    Therapy Diagnosis:   Encounter Diagnoses   Name Primary?    Decreased strength of lower extremity Yes    Decreased range of left hip movement        Physician: Kit Vasquez MD    Visit Date: 4/3/2024  Physician Orders: PT Eval and Treat   Medical Diagnosis from Referral:   S72.142A (ICD-10-CM) - Closed intertrochanteric fracture of left femur   S72.142D (ICD-10-CM) - Closed displaced intertrochanteric fracture of left femur with routine healing      Evaluation Date: 3/13/2024  Authorization Period Expiration: 12/31/24  Plan of Care Expiration: 5/10/24  Visit # / Visits authorized: 1/1, 6/15  FOTO: 7/10  PTA Visit #: 1/5     Time In: 9:00  Time Out:9:55  Total Billable Time: 55 minutes (1 MT Therapeutic Exercise - 2, 1 TA )     Precautions: Standard, Fall    Subjective     Patient reports: "I have been doing much better". States pain has been better controlled.   She was compliant with home exercise program.  Response to previous treatment: some soreness through the night   Functional change: able to transition easier in bed.     Pain: 0/10  Location: left hip      Objective      Objective Measures updated at progress report unless specified.   Left hip flexion: 115  External rotation 40 deg  Internal rotation 20 deg   Treatment     PEDRITO received the treatments listed below:      therapeutic exercises to develop strength, endurance, ROM, flexibility, posture, and core stabilization for 25 minutes including:  Nu step 6' lvl 2  Quad set 20x5", towel under knee NP  Heel slides 2x15 supine NP  Hamstring stretch seated 4x30" NP  Straight leg raise 50% assist left 2x10   Standing hip abduction 2x10 left; x 10 R  Long arc quad 3x10 bilateral, 3#  Short arc quads 3x10 left 3#   Seated ball squeeze 20x5"  Bridge 2x10    PEDRITO participated in gait training to improve functional mobility and " safety for 00  minutes, including:  Gait training with SBQC x 50ft NP  Step up L1 right 1x8- pelvic drop  NP    manual therapy techniques: Joint mobilizations were applied to the: left hip for 10 minutes, including:  Hip stretching flexion, abduction, internal rotation/external rotation   STM hip  Knee extension mobilizations  Patellar mobilizations    therapeutic activities to improve functional performance for 20  minutes, including:  Ambulation [id number] rolling walker   Sit to stand 3x8 standard chair upper extremity assist  Heel raises 3x10 double leg   Standing marches 2x10 in // bars       Patient Education and Home Exercises       Education provided:   - continue home exercise program     Written Home Exercises Provided: Patient instructed to cont prior HEP. Exercises were reviewed and PEDRITO was able to demonstrate them prior to the end of the session.  PEDRITO demonstrated good  understanding of the education provided. See Electronic Medical Record under Patient Instructions for exercises provided during therapy sessions    Assessment     Pedrito is a 88 y.o. female referred to outpatient Physical Therapy with a medical diagnosis of Closed displaced intertrochanteric fracture of left femur with routine healing. Pt presents 5 weeks day s/p left hip ORIF (2/24/24) ambulating with rolling walker.   Palpable mass at surgical site with edema along with tenderness to left glute med/ greater trochanter. Pain better controlled. Primary concern with strengthening.  Continued noted pelvic drop with single leg activities. Unable to complete more than one lap before needed rest break today. Continue to progress as tolerated.     PEDRITO Is progressing well towards her goals.   Patient prognosis is Good.     Patient will continue to benefit from skilled outpatient physical therapy to address the deficits listed in the problem list box on initial evaluation, provide pt/family education and to maximize pt's level of  independence in the home and community environment.     Patient's spiritual, cultural and educational needs considered and pt agreeable to plan of care and goals.     Anticipated barriers to physical therapy: age, co-morbidities     Goals:   Short Term Goals (3 Weeks):  1. Pt will be compliant with HEP to supplement PT in restoring pain free function.  2. Pt will improve impaired LE MMTs to >/= 4/5 to improve dynamic hip/knee support for functional tasks.  3. Patient will be able to perform active hip flexion to 90 deg to improve transitional movements. Met  4. Patient will be able to ambulate 150 ft to improve community ambulation. met  5. Patient will improve HOOS Jr score by 10% to improve functional mobility.      Long Term Goals (6 Weeks):  1. Pt will improve FOTO score to </= 40% limited to decrease perceived limitation with mobility.   2. Pt will improve impaired LE MMTs to >/= 4+/5 to improve dynamic hip/knee support for functional tasks.  3. Pt will improve knee flexion ROM to 0-115 deg to improve mobility for functional tasks.  4. Patient will be able to ambulate up/down 5 stairs 3x in order to simulate stair navigation in home.   5. Pt will complete 6mwt with least restrictive assistive device to promote community ambulation.      Plan     Plan of care Certification: 3/13/2024 to 4/26/24.     Kaia Perez, PTA

## 2024-04-08 ENCOUNTER — CLINICAL SUPPORT (OUTPATIENT)
Dept: REHABILITATION | Facility: HOSPITAL | Age: 89
End: 2024-04-08
Payer: MEDICARE

## 2024-04-08 DIAGNOSIS — M25.652 DECREASED RANGE OF LEFT HIP MOVEMENT: ICD-10-CM

## 2024-04-08 DIAGNOSIS — R29.898 DECREASED STRENGTH OF LOWER EXTREMITY: Primary | ICD-10-CM

## 2024-04-08 PROCEDURE — 97116 GAIT TRAINING THERAPY: CPT | Mod: PN

## 2024-04-08 PROCEDURE — 97140 MANUAL THERAPY 1/> REGIONS: CPT | Mod: PN

## 2024-04-08 PROCEDURE — 97110 THERAPEUTIC EXERCISES: CPT | Mod: PN

## 2024-04-08 PROCEDURE — 97530 THERAPEUTIC ACTIVITIES: CPT | Mod: PN

## 2024-04-08 NOTE — PROGRESS NOTES
[facility]   Physical Therapy Treatment Note / progress note     Name: Pedrito Pitt  Clinic Number: [id number]    Therapy Diagnosis:   Encounter Diagnoses   Name Primary?    Decreased strength of lower extremity Yes    Decreased range of left hip movement        Physician: Kit Vasquez MD    Visit Date: 4/8/2024  Physician Orders: PT Eval and Treat   Medical Diagnosis from Referral:   S72.142A (ICD-10-CM) - Closed intertrochanteric fracture of left femur   S72.142D (ICD-10-CM) - Closed displaced intertrochanteric fracture of left femur with routine healing      Evaluation Date: 3/13/2024  Authorization Period Expiration: 12/31/24  Plan of Care Expiration: 5/10/24  Visit # / Visits authorized: 1/1, 7/15  FOTO: 8/10  PTA Visit #: 1/5     Time In: 9:00  Time Out: 10:00  Total Billable Time: 60 minutes (1 MT Therapeutic Exercise - 1, 1 Mario, 1 GT )     Precautions: Standard, Fall    Subjective     Patient reports: doing well today, has pain across groin   She was compliant with home exercise program.  Response to previous treatment: some soreness through the night   Functional change: able to transition easier in bed.     Pain: 2/10  Location: left hip      Objective      Objective Measures updated at progress report unless specified.   Left hip flexion: 115  External rotation 40 deg  Internal rotation 20 deg       L/E MMT Right Left Pain/Dysfunction with Movement   Hip Flexion 9.6 kg 9.4kg      Hip Extension 4/5 4/5 Able to maintain 75% elevation bridge x 30 sec   Hip Abduction 4/5 sidelying (15.6 seated) 3/5 sidelying  (12.1kg seated)     Hip Adduction 5/5 4+/5     Hip ER 4+/5 4-/5     Knee Flexion 15.2kg 10.2kg     Knee Extension 22.0kg 15kg Left knee pop   Ankle DF 5/5 5/5     Ankle PF 4+/5 4+/5        sit to stand: 19in table 13x without upper extremity assist     Treatment     PEDRITO received the treatments listed below:      therapeutic exercises to develop strength, endurance,  "ROM, flexibility, posture, and core stabilization for  minutes including:  Nu step 6' lvl 2  Quad set 20x5", towel under knee NP  Heel slides 2x15 supine NP  Hamstring stretch seated 4x30" NP  Straight leg raise independent* 2x10   Standing hip abduction 2x10 left; (x 10 right NP)  Long arc quad 3x10 bilateral, 5#  Short arc quads 3x10 left 3#   Seated ball squeeze 20x5"  Bridge 2x10, 1x 30" hold    PEDRITO participated in gait training to improve functional mobility and safety for 15  minutes, including:  Gait training with SPC x 80ft   Step up L1 right 1x8- pelvic drop  NP  Modified tandem hold for supported single leg stance 3x30"    manual therapy techniques: Joint mobilizations were applied to the: left hip for 8 minutes, including:  Hip stretching flexion, abduction, internal rotation/external rotation   LAD grade 1-2  STM hip  Knee extension mobilizations NP  Patellar mobilizations NP    therapeutic activities to improve functional performance for 15  minutes, including:  Ambulation [id number] rolling walker   Sit to stand: 2x30" 14 no upper extremity assist at 21 in 13 at 19in table   Heel raises 3x10 double leg   Standing marches 2x10 in // bars       Patient Education and Home Exercises       Education provided:   - continue home exercise program     Written Home Exercises Provided: Patient instructed to cont prior HEP. Exercises were reviewed and PEDRITO was able to demonstrate them prior to the end of the session.  PEDRITO demonstrated good  understanding of the education provided. See Electronic Medical Record under Patient Instructions for exercises provided during therapy sessions    Assessment     Pedrito is a 88 y.o. female referred to outpatient Physical Therapy with a medical diagnosis of Closed displaced intertrochanteric fracture of left femur with routine healing. Pt presents 5 weeks day s/p left hip ORIF (2/24/24) ambulating with rolling walker.   Palpable mass at surgical site with edema and " bruising persists but both gradually improving. Pain mostly controlled with some groin pain reported. Steady improvements in hip range of motion. She continues to ambulate primarily with rolling walker as she is continuing to strengthen left glute med. At present, demonstrates significant trendelenburg with single point cane and is unable to perform step up which would be necessary to return to her home. Good improvement in transitions and transfers with ability to perform sit to stand without upper extremity assist. She will continue to benefit from skilled PT and will continue to progress as tolerated.     PEDRITO Is progressing well towards her goals.   Patient prognosis is Good.     Patient will continue to benefit from skilled outpatient physical therapy to address the deficits listed in the problem list box on initial evaluation, provide pt/family education and to maximize pt's level of independence in the home and community environment.     Patient's spiritual, cultural and educational needs considered and pt agreeable to plan of care and goals.     Anticipated barriers to physical therapy: age, co-morbidities     Goals:   Short Term Goals (3 Weeks):  1. Pt will be compliant with HEP to supplement PT in restoring pain free function.  2. Pt will improve impaired LE MMTs to >/= 4/5 to improve dynamic hip/knee support for functional tasks. Progressing, not met  3. Patient will be able to perform active hip flexion to 90 deg to improve transitional movements. Met  4. Patient will be able to ambulate 150 ft to improve community ambulation. met  5. Patient will improve HOOS Jr score by 10% to improve functional mobility. Progressing, not met     Long Term Goals (6 Weeks):  1. Pt will improve FOTO score to </= 40% limited to decrease perceived limitation with mobility. Progressing, not met  2. Pt will improve impaired LE MMTs to >/= 4+/5 to improve dynamic hip/knee support for functional tasks.Progressing, not  met  3. Pt will improve knee flexion ROM to 0-115 deg to improve mobility for functional tasks.Progressing, not met  4. Patient will be able to ambulate up/down 5 stairs 3x in order to simulate stair navigation in home.   5. Pt will complete 6mwt with least restrictive assistive device to promote community ambulation.  Progressing, not met    Plan     Plan of care Certification: 3/13/2024 to 4/26/24.     ANUJ LOZANO, PT

## 2024-04-10 ENCOUNTER — DOCUMENTATION ONLY (OUTPATIENT)
Dept: REHABILITATION | Facility: HOSPITAL | Age: 89
End: 2024-04-10
Payer: MEDICARE

## 2024-04-10 NOTE — PROGRESS NOTES
Physical therapist and physical therapy assistant(s) met face to face to discuss patient's treatment plan and progress towards established goals. Pt will be seen by a physical therapist minimally every 6th visit or every 30 days.    ANUJ LOZANO, PT, DPT

## 2024-04-11 ENCOUNTER — CLINICAL SUPPORT (OUTPATIENT)
Dept: REHABILITATION | Facility: HOSPITAL | Age: 89
End: 2024-04-11
Payer: MEDICARE

## 2024-04-11 DIAGNOSIS — M25.652 DECREASED RANGE OF LEFT HIP MOVEMENT: ICD-10-CM

## 2024-04-11 DIAGNOSIS — R29.898 DECREASED STRENGTH OF LOWER EXTREMITY: Primary | ICD-10-CM

## 2024-04-11 PROCEDURE — 97110 THERAPEUTIC EXERCISES: CPT | Mod: PN

## 2024-04-11 PROCEDURE — 97140 MANUAL THERAPY 1/> REGIONS: CPT | Mod: PN

## 2024-04-11 PROCEDURE — 97530 THERAPEUTIC ACTIVITIES: CPT | Mod: PN

## 2024-04-11 NOTE — PROGRESS NOTES
"[facility]   Physical Therapy Treatment Note      Name: Pedrito Pitt  Clinic Number: [id number]    Therapy Diagnosis:   Encounter Diagnoses   Name Primary?    Decreased strength of lower extremity Yes    Decreased range of left hip movement          Physician: Kit Vasquez MD    Visit Date: 4/11/2024  Physician Orders: PT Eval and Treat   Medical Diagnosis from Referral:   S72.142A (ICD-10-CM) - Closed intertrochanteric fracture of left femur   S72.142D (ICD-10-CM) - Closed displaced intertrochanteric fracture of left femur with routine healing      Evaluation Date: 3/13/2024  Authorization Period Expiration: 12/31/24  Plan of Care Expiration: 5/10/24  Visit # / Visits authorized: 1/1, 8/15  FOTO: 9/10- next   PTA Visit #: 1/5     Time In: 9:00  Time Out: 9:57  Total Billable Time: 57 minutes (1 MT Therapeutic Exercise - 2, 1 Mario, )     Precautions: Standard, Fall    Subjective     Patient reports: saw doctor on 4/9 and doing well and recommended continued PT. New onset of left foot pain and  groin pain especially at night.  She had to go back to wearing a shoe one size up   She was compliant with home exercise program.  Response to previous treatment: some soreness through the night   Functional change: able to transition easier in bed, able to raise from public toilet seat    Pain: 2/10  Location: left hip      Objective      Objective Measures updated at progress report unless specified.     Treatment     PEDRITO received the treatments listed below:      therapeutic exercises to develop strength, endurance, ROM, flexibility, posture, and core stabilization for 34 minutes including:  Nu step 6' lvl 2  Heel slides 2x15 supine NP  Hamstring stretch seated 4x30" NP  Straight leg raise independent* 2x5- regressed due to pain  Standing hip abduction 2x10 left; (x 10 right NP)  Long arc quad 3x10 bilateral, 5#  Short arc quads 3x10 left 3#   Seated ball squeeze 20x5"  Bridge " "3x10  Supine hip abduction 2x10   Hip abduction isometric 5x30"     PEDRITO participated in gait training to improve functional mobility and safety for 0  minutes, including:  Gait training with SPC x 80ft   Step up L1 right 1x8- pelvic drop  NP  Modified tandem hold for supported single leg stance 3x30"    manual therapy techniques: Joint mobilizations were applied to the: left hip for 15 minutes, including:  Hip stretching flexion, abduction, internal rotation/external rotation   LAD grade 1-2  STM hip  Knee extension mobilizations   Patellar mobilizations     therapeutic activities to improve functional performance for 8  minutes, including:  Ambulation 2x394up rolling walker  NP  Sit to stand: 2x10 21in table   Heel raises 3x10 double leg NP  Standing marches 2x10 in // bars NP      Patient Education and Home Exercises       Education provided:   - continue home exercise program     Written Home Exercises Provided: Patient instructed to cont prior HEP. Exercises were reviewed and PEDRITO was able to demonstrate them prior to the end of the session.  PEDRITO demonstrated good  understanding of the education provided. See Electronic Medical Record under Patient Instructions for exercises provided during therapy sessions    Assessment     Pedrito is a 88 y.o. female referred to outpatient Physical Therapy with a medical diagnosis of Closed displaced intertrochanteric fracture of left femur with routine healing. Pt presents 5 weeks day s/p left hip ORIF (2/24/24) ambulating with rolling walker.   Palpable mass at surgical site with edema and bruising persists but both gradually improving. Complaints of groin and new left foot pain today. Held some standing activities. Added additional exercises for abduction strengthening. Unable to perform sidelying hip abduction, performed in supine. Continue to monitor foot and groin pain and progress as tolerated. She will continue to benefit from skilled PT and will " continue to progress as tolerated.     PEDRITO Is progressing well towards her goals.   Patient prognosis is Good.     Patient will continue to benefit from skilled outpatient physical therapy to address the deficits listed in the problem list box on initial evaluation, provide pt/family education and to maximize pt's level of independence in the home and community environment.     Patient's spiritual, cultural and educational needs considered and pt agreeable to plan of care and goals.     Anticipated barriers to physical therapy: age, co-morbidities     Goals:   Short Term Goals (3 Weeks):  1. Pt will be compliant with HEP to supplement PT in restoring pain free function.  2. Pt will improve impaired LE MMTs to >/= 4/5 to improve dynamic hip/knee support for functional tasks. Progressing, not met  3. Patient will be able to perform active hip flexion to 90 deg to improve transitional movements. Met  4. Patient will be able to ambulate 150 ft to improve community ambulation. met  5. Patient will improve HOOS Jr score by 10% to improve functional mobility. Progressing, not met     Long Term Goals (6 Weeks):  1. Pt will improve FOTO score to </= 40% limited to decrease perceived limitation with mobility. Progressing, not met  2. Pt will improve impaired LE MMTs to >/= 4+/5 to improve dynamic hip/knee support for functional tasks.Progressing, not met  3. Pt will improve knee flexion ROM to 0-115 deg to improve mobility for functional tasks.Progressing, not met  4. Patient will be able to ambulate up/down 5 stairs 3x in order to simulate stair navigation in home.   5. Pt will complete 6mwt with least restrictive assistive device to promote community ambulation.  Progressing, not met    Plan     Plan of care Certification: 3/13/2024 to 4/26/24.     ANUJ LOZANO, PT

## 2024-04-15 ENCOUNTER — CLINICAL SUPPORT (OUTPATIENT)
Dept: REHABILITATION | Facility: HOSPITAL | Age: 89
End: 2024-04-15
Payer: MEDICARE

## 2024-04-15 DIAGNOSIS — R29.898 DECREASED STRENGTH OF LOWER EXTREMITY: Primary | ICD-10-CM

## 2024-04-15 DIAGNOSIS — M25.652 DECREASED RANGE OF LEFT HIP MOVEMENT: ICD-10-CM

## 2024-04-15 PROCEDURE — 97110 THERAPEUTIC EXERCISES: CPT | Mod: PN

## 2024-04-15 PROCEDURE — 97116 GAIT TRAINING THERAPY: CPT | Mod: PN

## 2024-04-15 NOTE — PROGRESS NOTES
"[facility]   Physical Therapy Treatment Note      Name: Pedrito Pitt  Clinic Number: [id number]    Therapy Diagnosis:   Encounter Diagnoses   Name Primary?    Decreased strength of lower extremity Yes    Decreased range of left hip movement          Physician: Kit Vasquez MD    Visit Date: 4/15/2024  Physician Orders: PT Eval and Treat   Medical Diagnosis from Referral:   S72.142A (ICD-10-CM) - Closed intertrochanteric fracture of left femur   S72.142D (ICD-10-CM) - Closed displaced intertrochanteric fracture of left femur with routine healing      Evaluation Date: 3/13/2024  Authorization Period Expiration: 12/31/24  Plan of Care Expiration:5/10/24  Visit # / Visits authorized: 1/1, 9/15  FOTO: 9/10- next   PTA Visit #: 1/5     Time In: 9:05  Time Out: 10:00  Total Billable Time: 30 minutes (Therapeutic Exercise - 2, 1 GT, )     Precautions: Standard, Fall    Subjective     Patient reports: no complaints of pain today   She was compliant with home exercise program.  Response to previous treatment: some soreness through the night   Functional change: able to transition easier in bed, able to raise from public toilet seat    Pain: 2/10  Location: left hip      Objective      Objective Measures updated at progress report unless specified.   6MWT 665.4 rolling walker     PEDRITO received the treatments listed below:      therapeutic exercises to develop strength, endurance, ROM, flexibility, posture, and core stabilization for 15 minutes including:  Nu step 6' lvl 2  Hamstring stretch seated 4x30" NP  Straight leg raise independent* 2x5- regressed due to pain  Standing hip abduction 2x10 left; (x 10 right NP)  Long arc quad 3x10 bilateral, 5#  Short arc quads 3x10 left 3# NP  Seated ball squeeze 20x5"  Bridge 3x10  Supine hip abduction 2x10   Hip abduction isometric 5x30" NP    PEDRITO participated in gait training to improve functional mobility and safety for 10 minutes, " "includinMWT rolling walker 665ft   Side stepping // bars 4 laps   Gait training with SPC x 80ft --next  Step up L1 2x10    Modified tandem hold for supported single leg stance 3x30"    manual therapy techniques: Joint mobilizations were applied to the: left hip for 0 minutes, including:  Hip stretching flexion, abduction, internal rotation/external rotation   LAD grade 1-2  STM hip  Knee extension mobilizations   Patellar mobilizations     therapeutic activities to improve functional performance for 5  minutes, including:  Ambulation [id number] rolling walker  NP  Sit to stand: 2x10 21in table   Heel raises 3x10 double leg NP  Standing marches 2x10 in // bars NP      Patient Education and Home Exercises       Education provided:   - continue home exercise program     Written Home Exercises Provided: Patient instructed to cont prior HEP. Exercises were reviewed and PEDRITO was able to demonstrate them prior to the end of the session.  PEDRITO demonstrated good  understanding of the education provided. See Electronic Medical Record under Patient Instructions for exercises provided during therapy sessions    Assessment     Pedrito is a 88 y.o. female referred to outpatient Physical Therapy with a medical diagnosis of Closed displaced intertrochanteric fracture of left femur with routine healing. Pt presents 5 weeks day s/p left hip ORIF (24) ambulating with rolling walker.   Palpable mass at surgical site nearly resolved. No foot pain reported today. Progressed standing activities today without complaints of pain. Patient able to complete 6mwt with minimal rest breaks but moderate fatigue following. Continues to have pain in groin with hip abduction. Work on cane ambulation as tolerated next visit. She will continue to benefit from skilled PT and will continue to progress as tolerated.      PEDRITO Is progressing well towards her goals.   Patient prognosis is Good.     Patient will continue to benefit from " skilled outpatient physical therapy to address the deficits listed in the problem list box on initial evaluation, provide pt/family education and to maximize pt's level of independence in the home and community environment.     Patient's spiritual, cultural and educational needs considered and pt agreeable to plan of care and goals.     Anticipated barriers to physical therapy: age, co-morbidities     Goals:   Short Term Goals (3 Weeks):  1. Pt will be compliant with HEP to supplement PT in restoring pain free function.  2. Pt will improve impaired LE MMTs to >/= 4/5 to improve dynamic hip/knee support for functional tasks. Progressing, not met  3. Patient will be able to perform active hip flexion to 90 deg to improve transitional movements. Met  4. Patient will be able to ambulate 150 ft to improve community ambulation. met  5. Patient will improve HOOS Jr score by 10% to improve functional mobility. Progressing, not met     Long Term Goals (6 Weeks):  1. Pt will improve FOTO score to </= 40% limited to decrease perceived limitation with mobility. Progressing, not met  2. Pt will improve impaired LE MMTs to >/= 4+/5 to improve dynamic hip/knee support for functional tasks.Progressing, not met  3. Pt will improve knee flexion ROM to 0-115 deg to improve mobility for functional tasks.Progressing, not met  4. Patient will be able to ambulate up/down 5 stairs 3x in order to simulate stair navigation in home.   5. Pt will complete 6mwt with least restrictive assistive device to promote community ambulation.  Progressing, not met    Plan     Plan of care Certification: 3/13/2024 to 4/26/24.     ANUJ Cruz, PT

## 2024-04-17 ENCOUNTER — CLINICAL SUPPORT (OUTPATIENT)
Dept: REHABILITATION | Facility: HOSPITAL | Age: 89
End: 2024-04-17
Payer: MEDICARE

## 2024-04-17 DIAGNOSIS — R29.898 DECREASED STRENGTH OF LOWER EXTREMITY: Primary | ICD-10-CM

## 2024-04-17 DIAGNOSIS — M25.652 DECREASED RANGE OF LEFT HIP MOVEMENT: ICD-10-CM

## 2024-04-17 PROCEDURE — 97530 THERAPEUTIC ACTIVITIES: CPT | Mod: PN,CQ

## 2024-04-17 PROCEDURE — 97110 THERAPEUTIC EXERCISES: CPT | Mod: PN,CQ

## 2024-04-17 NOTE — PROGRESS NOTES
"[facility]   Physical Therapy Treatment Note      Name: Pedrito Pitt  Clinic Number: [id number]    Therapy Diagnosis:   Encounter Diagnoses   Name Primary?    Decreased strength of lower extremity Yes    Decreased range of left hip movement        Physician: Kit Vasquez MD    Visit Date: 4/17/2024  Physician Orders: PT Eval and Treat   Medical Diagnosis from Referral:   S72.142A (ICD-10-CM) - Closed intertrochanteric fracture of left femur   S72.142D (ICD-10-CM) - Closed displaced intertrochanteric fracture of left femur with routine healing      Evaluation Date: 3/13/2024  Authorization Period Expiration: 12/31/24  Plan of Care Expiration:5/10/24  Visit # / Visits authorized: 1/1, 10/15  FOTO: 10/10- next   PTA Visit #: 1/5     Time In: 9:05  Time Out: 10:00  Total Billable Time: 30 minutes (1TA, 1TE )     Precautions: Standard, Fall    Subjective     Patient reports: no complaints of pain today   She was compliant with home exercise program.  Response to previous treatment: some soreness through the night   Functional change: able to transition easier in bed, able to raise from public toilet seat    Pain: 0/10  Location: left hip      Objective      Objective Measures updated at progress report unless specified.      PEDRITO received the treatments listed below:      therapeutic exercises to develop strength, endurance, ROM, flexibility, posture, and core stabilization for 10 minutes 1:1 including:  Nu step 6' lvl 2  Straight leg raise independent* 2x5- regressed due to pain NP  Standing hip abduction 2x10 left  Long arc quad 3x10 bilateral, 5#  Seated ball squeeze 20x5"  Bridge 3x10 staggered   Supine hip abduction 2x10   Hip abduction isometric 5x30" NP    PEDRITO participated in gait training to improve functional mobility and safety for 10 minutes 1:1, including:  Gait training with SPC 2 x 150ft; cues for appropriate use of AD  Modified tandem hold for supported single leg " "stance 3x30" NP    manual therapy techniques: Joint mobilizations were applied to the: left hip for 0 minutes, including:  Hip stretching flexion, abduction, internal rotation/external rotation   LAD grade 1-2  STM hip  Knee extension mobilizations   Patellar mobilizations     therapeutic activities to improve functional performance for 10 minutes 1:1, including:  Side stepping // bars 4 laps   Step up L1 2x10    Sit to stand: 2x10 standard chair + airex  Heel raises 3x10 double leg   Standing marches 2x10 in // bars       Patient Education and Home Exercises       Education provided:   - continue home exercise program     Written Home Exercises Provided: Patient instructed to cont prior HEP. Exercises were reviewed and PEDRITO was able to demonstrate them prior to the end of the session.  PEDRITO demonstrated good  understanding of the education provided. See Electronic Medical Record under Patient Instructions for exercises provided during therapy sessions    Assessment     Pedrito is a 88 y.o. female referred to outpatient Physical Therapy with a medical diagnosis of Closed displaced intertrochanteric fracture of left femur with routine healing. Pt presents 5 weeks day s/p left hip ORIF (2/24/24) ambulating with rolling walker.   Trial of cane ambulation today with occasional cuing for proper gait sequence and use of AD. Tolerated well with patient showing good don and no LOB. She will continue to benefit from skilled PT and will continue to progress as tolerated.      PEDRITO Is progressing well towards her goals.   Patient prognosis is Good.     Patient will continue to benefit from skilled outpatient physical therapy to address the deficits listed in the problem list box on initial evaluation, provide pt/family education and to maximize pt's level of independence in the home and community environment.     Patient's spiritual, cultural and educational needs considered and pt agreeable to plan of care " and goals.     Anticipated barriers to physical therapy: age, co-morbidities     Goals:   Short Term Goals (3 Weeks):  1. Pt will be compliant with HEP to supplement PT in restoring pain free function.  2. Pt will improve impaired LE MMTs to >/= 4/5 to improve dynamic hip/knee support for functional tasks. Progressing, not met  3. Patient will be able to perform active hip flexion to 90 deg to improve transitional movements. Met  4. Patient will be able to ambulate 150 ft to improve community ambulation. met  5. Patient will improve HOOS Jr score by 10% to improve functional mobility. Progressing, not met     Long Term Goals (6 Weeks):  1. Pt will improve FOTO score to </= 40% limited to decrease perceived limitation with mobility. Progressing, not met  2. Pt will improve impaired LE MMTs to >/= 4+/5 to improve dynamic hip/knee support for functional tasks.Progressing, not met  3. Pt will improve knee flexion ROM to 0-115 deg to improve mobility for functional tasks.Progressing, not met  4. Patient will be able to ambulate up/down 5 stairs 3x in order to simulate stair navigation in home.   5. Pt will complete 6mwt with least restrictive assistive device to promote community ambulation.  Progressing, not met    Plan     Plan of care Certification: 3/13/2024 to 4/26/24.     Kaia Perez, PTA

## 2024-04-22 ENCOUNTER — CLINICAL SUPPORT (OUTPATIENT)
Dept: REHABILITATION | Facility: HOSPITAL | Age: 89
End: 2024-04-22
Payer: MEDICARE

## 2024-04-22 DIAGNOSIS — M25.652 DECREASED RANGE OF LEFT HIP MOVEMENT: ICD-10-CM

## 2024-04-22 DIAGNOSIS — R29.898 DECREASED STRENGTH OF LOWER EXTREMITY: Primary | ICD-10-CM

## 2024-04-22 PROCEDURE — 97110 THERAPEUTIC EXERCISES: CPT | Mod: PN

## 2024-04-22 PROCEDURE — 97116 GAIT TRAINING THERAPY: CPT | Mod: PN

## 2024-04-22 PROCEDURE — 97530 THERAPEUTIC ACTIVITIES: CPT | Mod: PN

## 2024-04-22 NOTE — PROGRESS NOTES
"[facility]   Physical Therapy Treatment Note      Name: Pedrito Pitt  Clinic Number: [id number]    Therapy Diagnosis:   Encounter Diagnoses   Name Primary?    Decreased strength of lower extremity Yes    Decreased range of left hip movement        Physician: Kit Vasquez MD    Visit Date: 4/22/2024  Physician Orders: PT Eval and Treat   Medical Diagnosis from Referral:   S72.142A (ICD-10-CM) - Closed intertrochanteric fracture of left femur   S72.142D (ICD-10-CM) - Closed displaced intertrochanteric fracture of left femur with routine healing      Evaluation Date: 3/13/2024  Authorization Period Expiration: 12/31/24  Plan of Care Expiration:5/10/24  Visit # / Visits authorized: 1/1, 11/15  FOTO: 2/3 completed 4/17/24  PTA Visit #: 1/5    Time In: 9:00  Time Out: 9:59  Total Billable Time: 59 minutes (1TA, 2TE, 1 GT )     Precautions: Standard, Fall    Subjective     Patient reports:no pain, some groin soreness.    She was compliant with home exercise program.  Response to previous treatment: some soreness through the night   Functional change: able to transition easier in bed, able to raise from public toilet seat    Pain: 0/10  Location: left hip      Objective      Objective Measures updated at progress report unless specified.    Trendelenburg gait with single point cane     PEDRITO received the treatments listed below:      therapeutic exercises to develop strength, endurance, ROM, flexibility, posture, and core stabilization for 30 minutes 1:1 including:  Nu step 6' lvl 2  Straight leg raise independent* 2x5- regressed due to pain NP  Standing hip abduction 2x10 left NP  Long arc quad 3x10 bilateral, 5#  Seated ball squeeze 20x5"  Bridge 3x10 staggered   Supine hip abduction 2x10   Hip abduction isometric 5x30"   SLS 2x30" bilateral upper extremity support    PEDRITO participated in gait training to improve functional mobility and safety for 15 minutes 1:1, including:  Gait " "training with SPC 2 x 150ft; cues for appropriate use of AD  Modified tandem hold for supported single leg stance 3x30" NP  Side stepping // bars 4 laps     manual therapy techniques: Joint mobilizations were applied to the: left hip for 0 minutes, including:  Hip stretching flexion, abduction, internal rotation/external rotation   LAD grade 1-2  STM hip  Knee extension mobilizations   Patellar mobilizations     therapeutic activities to improve functional performance for 13 minutes 1:1, including:  Step up L1 2x10    Lateral step up 2in oval left 4in right    Sit to stand: 2x10 standard chair  Heel raises 3x10 double leg   Standing marches 2x10 in // bars - limited flexion due to pain left       Patient Education and Home Exercises       Education provided:   - continue home exercise program     Written Home Exercises Provided: Patient instructed to cont prior HEP. Exercises were reviewed and PEDRITO was able to demonstrate them prior to the end of the session.  PEDRITO demonstrated good  understanding of the education provided. See Electronic Medical Record under Patient Instructions for exercises provided during therapy sessions    Assessment     Pedrito is a 88 y.o. female referred to outpatient Physical Therapy with a medical diagnosis of Closed displaced intertrochanteric fracture of left femur with routine healing. Pt presents 6 weeks day s/p left hip ORIF (2/24/24) ambulating with rolling walker.   Steady progression of standing activities to improve left side weight bearing tolerance. Patient required min cuing for proper gait and demonstrates trendelenburg. Requires moderate upper extremity support with standing activities. Limited tolerance for lateral step up but improving forward step. She will continue to benefit from skilled PT to improve functional mobility and independence to allow her to return home where she has steps.       PEDRITO Is progressing well towards her goals.   Patient prognosis is " Good.     Patient will continue to benefit from skilled outpatient physical therapy to address the deficits listed in the problem list box on initial evaluation, provide pt/family education and to maximize pt's level of independence in the home and community environment.     Patient's spiritual, cultural and educational needs considered and pt agreeable to plan of care and goals.     Anticipated barriers to physical therapy: age, co-morbidities     Goals:   Short Term Goals (3 Weeks):  1. Pt will be compliant with HEP to supplement PT in restoring pain free function.  2. Pt will improve impaired LE MMTs to >/= 4/5 to improve dynamic hip/knee support for functional tasks. Progressing, not met  3. Patient will be able to perform active hip flexion to 90 deg to improve transitional movements. Met  4. Patient will be able to ambulate 150 ft to improve community ambulation. met  5. Patient will improve HOOS Jr score by 10% to improve functional mobility. Progressing, not met     Long Term Goals (6 Weeks):  1. Pt will improve FOTO score to </= 40% limited to decrease perceived limitation with mobility. Progressing, not met  2. Pt will improve impaired LE MMTs to >/= 4+/5 to improve dynamic hip/knee support for functional tasks.Progressing, not met  3. Pt will improve knee flexion ROM to 0-115 deg to improve mobility for functional tasks.Progressing, not met  4. Patient will be able to ambulate up/down 5 stairs 3x in order to simulate stair navigation in home.   5. Pt will complete 6mwt with least restrictive assistive device to promote community ambulation.  Progressing, not met    Plan     Plan of care Certification: 3/13/2024 to 4/26/24.     ANUJ Cruz, PT

## 2024-04-24 ENCOUNTER — CLINICAL SUPPORT (OUTPATIENT)
Dept: REHABILITATION | Facility: HOSPITAL | Age: 89
End: 2024-04-24
Payer: MEDICARE

## 2024-04-24 DIAGNOSIS — M25.652 DECREASED RANGE OF LEFT HIP MOVEMENT: ICD-10-CM

## 2024-04-24 DIAGNOSIS — R29.898 DECREASED STRENGTH OF LOWER EXTREMITY: Primary | ICD-10-CM

## 2024-04-24 PROCEDURE — 97110 THERAPEUTIC EXERCISES: CPT | Mod: PN

## 2024-04-24 PROCEDURE — 97530 THERAPEUTIC ACTIVITIES: CPT | Mod: PN

## 2024-04-24 NOTE — PLAN OF CARE
[facility]   Physical Therapy plan of care      Name: Sola Pitt  Clinic Number: [id number]    Therapy Diagnosis:   Encounter Diagnoses   Name Primary?    Decreased strength of lower extremity Yes    Decreased range of left hip movement        Physician: Kit Vasquez MD    Visit Date: 4/24/2024  Physician Orders: PT Eval and Treat   Medical Diagnosis from Referral:   S72.142A (ICD-10-CM) - Closed intertrochanteric fracture of left femur   S72.142D (ICD-10-CM) - Closed displaced intertrochanteric fracture of left femur with routine healing      Evaluation Date: 3/13/2024  Authorization Period Expiration: 12/31/24  Plan of Care Expiration:5/31/24  Visit # / Visits authorized: 1/1, 12/15  FOTO: 2/3 completed 4/17/24  PTA Visit #: 1/5    Time In: 9:00  Time Out: 9:55  Total Billable Time: 59 minutes (1TA, 2TE, 1 GT )     Precautions: Standard, Fall    Subjective     Patient reports: doing well overall. Continues to have some groin pain. She is able to sit longer. She is able to dress and bathe independently. Continues to have some difficulty walking and with stairs.   She was compliant with home exercise program.  Response to previous treatment: some soreness through the night   Functional change: able to transition easier in bed, able to raise from public toilet seat    Pain: 0/10  Location: left hip      Objective      Objective Measures updated at progress report unless specified.    LITO Diaz. 76.8     Trendelenburg gait with single point cane, decreased bilateral knee extension     Posture: slight trunk flexion, decreased knee extension bilateral   Palpation: min swelling in lower leg, bruising near completely healed.   Sensation: normal     Range of Motion/Strength:      Hip Right Left Pain/Dysfunction with Movement   AROM/PROM         flexion  95  90/115      extension  Not tested   Not tested      abduction  50  35     Internal rotation  40  28     External rotation  40  35         Knee Right Left Pain/Dysfunction with Movement   AROM/PROM         flexion  128  126 Left knee crepitus    extension  5  6           L/E MMT Right Left Pain/Dysfunction with Movement   Hip Flexion 5/5 4-/5     Hip Extension 3+/5 3+/5  Able to perform sit to stand with use of hands    Hip Abduction 4+/5 3/5     Hip Adduction 5/5 5/5     Hip ER 5/5 4/5     Knee Flexion 4+/5 4+/5     Knee Extension 5/5 4+/5    Ankle DF 5/5 5/5     Ankle PF 4+/5 4+/5           Joint Mobility:   Hip: not tested   Knee: decreased patellar mobility all directions left     Flexibility:  within normal limits bilateral      30 sec Sit to stand: 13 with upper extremity assist from standard chair    2MWT 292ft rolling walker   6MWT 665.4 rolling walker  (4/15/24)    TU.88  seconds rolling walker, 17.5 seconds single point cane   TUG Cutoff Scores:13.5         Treatment       PEDRITO received the treatments listed in daily note    Patient Education and Home Exercises       Education provided:   - continue home exercise program     Written Home Exercises Provided: Patient instructed to cont prior HEP. Exercises were reviewed and PEDRITO was able to demonstrate them prior to the end of the session.  PEDRITO demonstrated good  understanding of the education provided. See Electronic Medical Record under Patient Instructions for exercises provided during therapy sessions    Assessment     Pedrito is a 88 y.o. female referred to outpatient Physical Therapy with a medical diagnosis of Closed displaced intertrochanteric fracture of left femur with routine healing. Pt presents 8 weeks day s/p left hip ORIF (24) ambulating with rolling walker. Continuing to practice ambulation with single point cane but requires supervision for balance and antalgic gait. Patient is making steady progress in therapy. She has made good improvements in hip range of motion and appropriate progressions for strength per stage of condition. She continues to have  difficulty with ambulation, car transfers, and stair navigation. Patient lives alone in home with stairs in [place] and is not yet appropriate for return to her home. She will continue to benefit from skilled PT to improve strength, balance, gait, and mobility to reduce fall risk and allow for return to her home. She will continue to stay with daughters in [place] until appropriate for discharge.     PEDRITO Is progressing well towards her goals.   Patient prognosis is Good.     Patient will continue to benefit from skilled outpatient physical therapy to address the deficits listed in the problem list box on initial evaluation, provide pt/family education and to maximize pt's level of independence in the home and community environment.     Patient's spiritual, cultural and educational needs considered and pt agreeable to plan of care and goals.     Anticipated barriers to physical therapy: age, co-morbidities     Goals:   Short Term Goals (3 Weeks):  1. Pt will be compliant with HEP to supplement PT in restoring pain free function.met  2. Pt will improve impaired LE MMTs to >/= 4/5 to improve dynamic hip/knee support for functional tasks. Progressing, not met  3. Patient will be able to perform active hip flexion to 90 deg to improve transitional movements. Met  4. Patient will be able to ambulate 150 ft to improve community ambulation. met  5. Patient will improve HOOS Jr score by 10% to improve functional mobility. Met     Long Term Goals (6 Weeks):  1. Pt will improve FOTO score to </= 40% limited to decrease perceived limitation with mobility. Progressing, not met  2. Pt will improve impaired LE MMTs to >/= 4+/5 to improve dynamic hip/knee support for functional tasks.Progressing, not met  3. Pt will improve knee flexion ROM to 0-115 deg to improve mobility for functional tasks. Met flexion, progressing ext  4. Patient will be able to ambulate up/down 5 stairs 3x in order to simulate stair navigation in  home. Progressing, not met  5. Pt will complete 6mwt with least restrictive assistive device to promote community ambulation.  Progressing, not met    Plan     Plan of care Certification: 5/31/24 2x/week 5 weeks    ANUJ Cruz, PT

## 2024-04-24 NOTE — PROGRESS NOTES
[facility]   Physical Therapy Treatment Note / plan of care      Name: Sola Pitt  Clinic Number: [id number]    Therapy Diagnosis:   Encounter Diagnoses   Name Primary?    Decreased strength of lower extremity Yes    Decreased range of left hip movement        Physician: Kit Vasquez MD    Visit Date: 4/24/2024  Physician Orders: PT Eval and Treat   Medical Diagnosis from Referral:   S72.142A (ICD-10-CM) - Closed intertrochanteric fracture of left femur   S72.142D (ICD-10-CM) - Closed displaced intertrochanteric fracture of left femur with routine healing      Evaluation Date: 3/13/2024  Authorization Period Expiration: 12/31/24  Plan of Care Expiration:5/31/24  Visit # / Visits authorized: 1/1, 12/15  FOTO: 2/3 completed 4/17/24  PTA Visit #: 1/5    Time In: 9:00  Time Out: 9:55  Total Billable Time: 59 minutes (1TA, 2TE, 1 GT )     Precautions: Standard, Fall    Subjective     Patient reports: doing well overall. Continues to have some groin pain. She is able to sit longer. She is able to dress and bathe independently. Continues to have some difficulty walking and with stairs.   She was compliant with home exercise program.  Response to previous treatment: some soreness through the night   Functional change: able to transition easier in bed, able to raise from public toilet seat    Pain: 0/10  Location: left hip      Objective      Objective Measures updated at progress report unless specified.    LITO Diaz. 76.8     Trendelenburg gait with single point cane, decreased bilateral knee extension     Posture: slight trunk flexion, decreased knee extension bilateral   Palpation: min swelling in lower leg, bruising near completely healed.   Sensation: normal     Range of Motion/Strength:      Hip Right Left Pain/Dysfunction with Movement   AROM/PROM         flexion  95  90/115      extension  Not tested   Not tested      abduction  50  35     Internal rotation  40  28     External  "rotation  40  35        Knee Right Left Pain/Dysfunction with Movement   AROM/PROM         flexion  128  126 Left knee crepitus    extension  5  6           L/E MMT Right Left Pain/Dysfunction with Movement   Hip Flexion 5/5 4-/5     Hip Extension 3+/5 3+/5  Able to perform sit to stand with use of hands    Hip Abduction 4+/5 3/5     Hip Adduction 5/5 5/5     Hip ER 5/5 4/5     Knee Flexion 4+/5 4+/5     Knee Extension 5/5 4+/5    Ankle DF 5/5 5/5     Ankle PF 4+/5 4+/5           Joint Mobility:   Hip: not tested   Knee: decreased patellar mobility all directions left     Flexibility:  within normal limits bilateral      30 sec Sit to stand: 13 with upper extremity assist from standard chair    2MWT 292ft rolling walker   6MWT 665.4 rolling walker  (4/15/24)    TU.88  seconds rolling walker, 17.5 seconds single point cane   TUG Cutoff Scores:13.5         Treatment       PEDRITO received the treatments listed below:      therapeutic exercises to develop strength, endurance, ROM, flexibility, posture, and core stabilization for 15 minutes 1:1 including:  Objective measures  Nu step 6' lvl 2 NP  Straight leg raise independent* 2x5- regressed due to pain NP  Standing hip abduction 2x10 left towel slide  +Standing hip extension 2x10 left towel slide  Long arc quad 3x10 bilateral, 5#  Seated ball squeeze 20x5" NP  Bridge 3x10 staggered   Supine hip abduction 2x10 NP  Hip abduction isometric 5x30" NP  SLS 2x30" bilateral upper extremity support NP    PEDRITO participated in gait training to improve functional mobility and safety for 0 minutes 1:1, including:  Gait training with SPC 2 x 150ft; cues for appropriate use of AD  Modified tandem hold for supported single leg stance 3x30" NP  Side stepping // bars 4 laps     manual therapy techniques: Joint mobilizations were applied to the: left hip for 0 minutes, including:  Hip stretching flexion, abduction, internal rotation/external rotation   LAD grade 1-2  STM " hip  Knee extension mobilizations   Patellar mobilizations     therapeutic activities to improve functional performance for 15 minutes 1:1, including:  Step up L1 2x10    Lateral step up 2in oval left 4in right    Sit to stand: 2x10 standard chair  Heel raises 3x10 double leg   Standing marches 2x10 in // bars - limited flexion due to pain left       Patient Education and Home Exercises       Education provided:   - continue home exercise program     Written Home Exercises Provided: Patient instructed to cont prior HEP. Exercises were reviewed and PEDRITO was able to demonstrate them prior to the end of the session.  PEDRITO demonstrated good  understanding of the education provided. See Electronic Medical Record under Patient Instructions for exercises provided during therapy sessions    Assessment     Pedrito is a 88 y.o. female referred to outpatient Physical Therapy with a medical diagnosis of Closed displaced intertrochanteric fracture of left femur with routine healing. Pt presents 8 weeks day s/p left hip ORIF (2/24/24) ambulating with rolling walker. Continuing to practice ambulation with single point cane but requires supervision for balance and antalgic gait. Patient is making steady progress in therapy. She has made good improvements in hip range of motion and appropriate progressions for strength per stage of condition. She continues to have difficulty with ambulation, car transfers, and stair navigation. Patient lives alone in home with stairs in [place] and is not yet appropriate for return to her home. She will continue to benefit from skilled PT to improve strength, balance, gait, and mobility to reduce fall risk and allow for return to her home. She will continue to stay with daughters in [place] until appropriate for discharge.     PEDRITO Is progressing well towards her goals.   Patient prognosis is Good.     Patient will continue to benefit from skilled outpatient physical therapy to  address the deficits listed in the problem list box on initial evaluation, provide pt/family education and to maximize pt's level of independence in the home and community environment.     Patient's spiritual, cultural and educational needs considered and pt agreeable to plan of care and goals.     Anticipated barriers to physical therapy: age, co-morbidities     Goals:   Short Term Goals (3 Weeks):  1. Pt will be compliant with HEP to supplement PT in restoring pain free function.met  2. Pt will improve impaired LE MMTs to >/= 4/5 to improve dynamic hip/knee support for functional tasks. Progressing, not met  3. Patient will be able to perform active hip flexion to 90 deg to improve transitional movements. Met  4. Patient will be able to ambulate 150 ft to improve community ambulation. met  5. Patient will improve HOOS Jr score by 10% to improve functional mobility. Met     Long Term Goals (6 Weeks):  1. Pt will improve FOTO score to </= 40% limited to decrease perceived limitation with mobility. Progressing, not met  2. Pt will improve impaired LE MMTs to >/= 4+/5 to improve dynamic hip/knee support for functional tasks.Progressing, not met  3. Pt will improve knee flexion ROM to 0-115 deg to improve mobility for functional tasks. Met flexion, progressing ext  4. Patient will be able to ambulate up/down 5 stairs 3x in order to simulate stair navigation in home. Progressing, not met  5. Pt will complete 6mwt with least restrictive assistive device to promote community ambulation.  Progressing, not met    Plan     Plan of care Certification: 5/31/24 2x/week 5 weeks    ANUJ Cruz, PT

## 2024-04-29 ENCOUNTER — CLINICAL SUPPORT (OUTPATIENT)
Dept: REHABILITATION | Facility: HOSPITAL | Age: 89
End: 2024-04-29
Payer: MEDICARE

## 2024-04-29 DIAGNOSIS — M25.652 DECREASED RANGE OF LEFT HIP MOVEMENT: ICD-10-CM

## 2024-04-29 DIAGNOSIS — R29.898 DECREASED STRENGTH OF LOWER EXTREMITY: Primary | ICD-10-CM

## 2024-04-29 PROCEDURE — 97116 GAIT TRAINING THERAPY: CPT | Mod: PN,CQ

## 2024-04-29 PROCEDURE — 97530 THERAPEUTIC ACTIVITIES: CPT | Mod: PN,CQ

## 2024-04-29 PROCEDURE — 97110 THERAPEUTIC EXERCISES: CPT | Mod: PN,CQ

## 2024-04-29 NOTE — PROGRESS NOTES
"[facility]   Physical Therapy Treatment Note / plan of care      Name: Sola Pitt  Clinic Number: [id number]    Therapy Diagnosis:   Encounter Diagnoses   Name Primary?    Decreased strength of lower extremity Yes    Decreased range of left hip movement        Physician: Kit Vasquez MD    Visit Date: 4/29/2024  Physician Orders: PT Eval and Treat   Medical Diagnosis from Referral:   S72.142A (ICD-10-CM) - Closed intertrochanteric fracture of left femur   S72.142D (ICD-10-CM) - Closed displaced intertrochanteric fracture of left femur with routine healing      Evaluation Date: 3/13/2024  Authorization Period Expiration: 12/31/24  Plan of Care Expiration:5/31/24  Visit # / Visits authorized: 1/1, 13/15  FOTO: 2/3 completed 4/17/24  PTA Visit #: 1/5    Time In: 9:00  Time Out: 9:55  Total Billable Time: 55 minutes (2TA, 1TE, 1 GT )     Precautions: Standard, Fall    Subjective     Patient reports: "I'm fine except my groin pain"   She was compliant with home exercise program.  Response to previous treatment: some soreness through the night   Functional change: able to transition easier in bed, able to raise from public toilet seat    Pain: 0/10  Location: left hip      Objective      Objective Measures updated at progress report unless specified.    LITO Swain 76.8     Trendelenburg gait with single point cane, decreased bilateral knee extension     Posture: slight trunk flexion, decreased knee extension bilateral   Palpation: min swelling in lower leg, bruising near completely healed.   Sensation: normal     Range of Motion/Strength:      Hip Right Left Pain/Dysfunction with Movement   AROM/PROM         flexion  95  90/115      extension  Not tested   Not tested      abduction  50  35     Internal rotation  40  28     External rotation  40  35        Knee Right Left Pain/Dysfunction with Movement   AROM/PROM         flexion  128  126 Left knee crepitus    extension  5  6           L/E " "MMT Right Left Pain/Dysfunction with Movement   Hip Flexion 5/5 4-/5     Hip Extension 3+/5 3+/5  Able to perform sit to stand with use of hands    Hip Abduction 4+/5 3/5     Hip Adduction 5/5 5/5     Hip ER 5/5 4/5     Knee Flexion 4+/5 4+/5     Knee Extension 5/5 4+/5    Ankle DF 5/5 5/5     Ankle PF 4+/5 4+/5           Joint Mobility:   Hip: not tested   Knee: decreased patellar mobility all directions left     Flexibility:  within normal limits bilateral      30 sec Sit to stand: 13 with upper extremity assist from standard chair    2MWT 292ft rolling walker   6MWT 665.4 rolling walker  (4/15/24)    TU.88  seconds rolling walker, 17.5 seconds single point cane   TUG Cutoff Scores:13.5         Treatment       PEDRITO received the treatments listed below:      therapeutic exercises to develop strength, endurance, ROM, flexibility, posture, and core stabilization for 20 minutes 1:1 including:    Nu step 6' lvl 2   Standing hip abduction 2x10 left towel slide  Standing hip extension 2x10 left towel slide  Long arc quad 3x10 bilateral, 5#  Bridge 3x10 staggered   Hip abduction isometric 5x30"     Not today  Supine hip abduction 2x10 NP  SLS 2x30" bilateral upper extremity support NP  Straight leg raise independent* 2x5- regressed due to pain NP  Seated ball squeeze 20x5" HELGA MAJOR participated in gait training to improve functional mobility and safety for 10 minutes 1:1, including:  Gait training with SPC 2 x 150ft; cues for appropriate use of AD  Modified tandem hold for supported single leg stance 3x30" NP    manual therapy techniques: Joint mobilizations were applied to the: left hip for 0 minutes, including:  Hip stretching flexion, abduction, internal rotation/external rotation   LAD grade 1-2  STM hip  Knee extension mobilizations   Patellar mobilizations     therapeutic activities to improve functional performance for 25 minutes 1:1, including:  Step up L1 2x10    Lateral step up 2in oval left 4in " right    Side stepping // bars 4 laps   Sit to stand: 2x10 standard chair  Heel raises 3x10 double leg   Standing marches 2x10 in // bars - limited flexion due to pain left       Patient Education and Home Exercises       Education provided:   - continue home exercise program     Written Home Exercises Provided: Patient instructed to cont prior HEP. Exercises were reviewed and PEDRITO was able to demonstrate them prior to the end of the session.  PEDRITO demonstrated good  understanding of the education provided. See Electronic Medical Record under Patient Instructions for exercises provided during therapy sessions    Assessment     Pedrito is a 88 y.o. female referred to outpatient Physical Therapy with a medical diagnosis of Closed displaced intertrochanteric fracture of left femur with routine healing. Pt presents 9 weeks day s/p left hip ORIF (2/24/24) ambulating with rolling walker. Continuing to practice ambulation with single point cane. Overall good completion with no LOB or pain noted. Patient only complaint with occasional groin pain the is primarily present with left hip flexion movement, causing difficulty with stair negotiation and transfers.  Patient lives alone in home with stairs in [place] and is not yet appropriate for return to her home. She will continue to benefit from skilled PT to improve strength, balance, gait, and mobility to reduce fall risk and allow for return to her home. She will continue to stay with daughters in [place] until appropriate for discharge.     PEDRITO Is progressing well towards her goals.   Patient prognosis is Good.     Patient will continue to benefit from skilled outpatient physical therapy to address the deficits listed in the problem list box on initial evaluation, provide pt/family education and to maximize pt's level of independence in the home and community environment.     Patient's spiritual, cultural and educational needs considered and pt agreeable to  plan of care and goals.     Anticipated barriers to physical therapy: age, co-morbidities     Goals:   Short Term Goals (3 Weeks):  1. Pt will be compliant with HEP to supplement PT in restoring pain free function.met  2. Pt will improve impaired LE MMTs to >/= 4/5 to improve dynamic hip/knee support for functional tasks. Progressing, not met  3. Patient will be able to perform active hip flexion to 90 deg to improve transitional movements. Met  4. Patient will be able to ambulate 150 ft to improve community ambulation. met  5. Patient will improve HOOS Jr score by 10% to improve functional mobility. Met     Long Term Goals (6 Weeks):  1. Pt will improve FOTO score to </= 40% limited to decrease perceived limitation with mobility. Progressing, not met  2. Pt will improve impaired LE MMTs to >/= 4+/5 to improve dynamic hip/knee support for functional tasks.Progressing, not met  3. Pt will improve knee flexion ROM to 0-115 deg to improve mobility for functional tasks. Met flexion, progressing ext  4. Patient will be able to ambulate up/down 5 stairs 3x in order to simulate stair navigation in home. Progressing, not met  5. Pt will complete 6mwt with least restrictive assistive device to promote community ambulation.  Progressing, not met    Plan     Plan of care Certification: 5/31/24 2x/week 5 weeks    Kaia Perez, PTA

## 2024-05-01 ENCOUNTER — CLINICAL SUPPORT (OUTPATIENT)
Dept: REHABILITATION | Facility: HOSPITAL | Age: 89
End: 2024-05-01
Payer: MEDICARE

## 2024-05-01 DIAGNOSIS — R29.898 DECREASED STRENGTH OF LOWER EXTREMITY: Primary | ICD-10-CM

## 2024-05-01 DIAGNOSIS — M25.652 DECREASED RANGE OF LEFT HIP MOVEMENT: ICD-10-CM

## 2024-05-01 PROCEDURE — 97110 THERAPEUTIC EXERCISES: CPT | Mod: PN

## 2024-05-01 PROCEDURE — 97530 THERAPEUTIC ACTIVITIES: CPT | Mod: PN

## 2024-05-01 PROCEDURE — 97116 GAIT TRAINING THERAPY: CPT | Mod: PN

## 2024-05-01 NOTE — PROGRESS NOTES
"[facility]   Physical Therapy Treatment Note      Name: Pedrito Pitt  Clinic Number: [id number]    Therapy Diagnosis:   Encounter Diagnoses   Name Primary?    Decreased strength of lower extremity Yes    Decreased range of left hip movement        Physician: Kit Vasquez MD    Visit Date: 5/1/2024  Physician Orders: PT Eval and Treat   Medical Diagnosis from Referral:   S72.142A (ICD-10-CM) - Closed intertrochanteric fracture of left femur   S72.142D (ICD-10-CM) - Closed displaced intertrochanteric fracture of left femur with routine healing      Evaluation Date: 3/13/2024  Authorization Period Expiration: 12/31/24  Plan of Care Expiration:5/31/24  Visit # / Visits authorized: 1/1, 14/25  FOTO: 2/3 completed 4/17/24  PTA Visit #: 1/5    Time In: 10:00  Time Out: 10:55  Total Billable Time: 55 minutes (2TA, 1TE, 1 GT )     Precautions: Standard, Fall    Subjective     Patient reports: groin pain comes and goes and is decreased intensity but still somewhat present with walking.   She was compliant with home exercise program.  Response to previous treatment: some soreness through the night   Functional change: able to transition easier in bed, able to raise from public toilet seat    Pain: 0/10  Location: left hip      Objective      Objective Measures updated at progress report unless specified.       Treatment       PEDRITO received the treatments listed below:      therapeutic exercises to develop strength, endurance, ROM, flexibility, posture, and core stabilization for 20 minutes 1:1 including:    Nu step 6' lvl 2   Standing hip abduction 2x10 towel slide bilateral   Standing hip extension 2x10 left towel slide  Long arc quad 3x10 bilateral, 7#  Bridge 3x10 staggered   Hip abduction isometric 5x30"   Hamstring stretch 3x30"     Not today  Supine hip abduction 2x10 NP  SLS 2x30" bilateral upper extremity support NP  Straight leg raise independent* 2x5- regressed due to pain " "NP  Seated ball squeeze 20x5" NP    PEDRITO participated in gait training to improve functional mobility and safety for 10 minutes 1:1, including:  Gait training with SPC 5' (3 laps) with one brief rest break ~20 seconds   Modified tandem hold for supported single leg stance 3x30" NP    therapeutic activities to improve functional performance for 25 minutes 1:1, including:  Step up L1 2x10  step to next step   Lateral step up and over 4in 2x10    Side stepping // bars 4 laps   Sit to stand: 2x10 standard chair  Heel raises 3x10 double leg   Standing marches 2x10 in // bars - limited flexion due to pain left   Step taps 2x10 bilateral -no left hip pain       manual therapy techniques: Joint mobilizations were applied to the: left hip for 0 minutes, including:  Hip stretching flexion, abduction, internal rotation/external rotation   LAD grade 1-2  STM hip  Knee extension mobilizations   Patellar mobilizations     Patient Education and Home Exercises       Education provided:   - continue home exercise program     Written Home Exercises Provided: Patient instructed to cont prior HEP. Exercises were reviewed and PEDRITO was able to demonstrate them prior to the end of the session.  PEDRITO demonstrated good  understanding of the education provided. See Electronic Medical Record under Patient Instructions for exercises provided during therapy sessions    Assessment     Pedrito is a 88 y.o. female referred to outpatient Physical Therapy with a medical diagnosis of Closed displaced intertrochanteric fracture of left femur with routine healing. Pt presents 9 weeks s/p left hip ORIF (2/24/24) ambulating with rolling walker. Able to practice 5' of ambulation with one brief rest break with single point cane with good tolerance. Gradual improvement in tolerance for hip flexion. Continues to demonstrate closed chain gluteal weakness. Progressing with stair navigation.   Patient lives alone in home with stairs in [place] " and is not yet appropriate for return to her home. She will continue to benefit from skilled PT to improve strength, balance, gait, and mobility to reduce fall risk and allow for return to her home. She will continue to stay with daughters in [place] until appropriate for discharge.     PEDRITO Is progressing well towards her goals.   Patient prognosis is Good.     Patient will continue to benefit from skilled outpatient physical therapy to address the deficits listed in the problem list box on initial evaluation, provide pt/family education and to maximize pt's level of independence in the home and community environment.     Patient's spiritual, cultural and educational needs considered and pt agreeable to plan of care and goals.     Anticipated barriers to physical therapy: age, co-morbidities     Goals:   Short Term Goals (3 Weeks):  1. Pt will be compliant with HEP to supplement PT in restoring pain free function.met  2. Pt will improve impaired LE MMTs to >/= 4/5 to improve dynamic hip/knee support for functional tasks. Progressing, not met  3. Patient will be able to perform active hip flexion to 90 deg to improve transitional movements. Met  4. Patient will be able to ambulate 150 ft to improve community ambulation. met  5. Patient will improve HOOS Jr score by 10% to improve functional mobility. Met     Long Term Goals (6 Weeks):  1. Pt will improve FOTO score to </= 40% limited to decrease perceived limitation with mobility. Progressing, not met  2. Pt will improve impaired LE MMTs to >/= 4+/5 to improve dynamic hip/knee support for functional tasks.Progressing, not met  3. Pt will improve knee flexion ROM to 0-115 deg to improve mobility for functional tasks. Met flexion, progressing ext  4. Patient will be able to ambulate up/down 5 stairs 3x in order to simulate stair navigation in home. Progressing, not met  5. Pt will complete 6mwt with least restrictive assistive device to promote community  ambulation.  Progressing, not met    Plan     Plan of care Certification: 5/31/24 2x/week 5 weeks    ANUJ Cruz, PT

## 2024-05-06 ENCOUNTER — CLINICAL SUPPORT (OUTPATIENT)
Dept: REHABILITATION | Facility: HOSPITAL | Age: 89
End: 2024-05-06
Payer: MEDICARE

## 2024-05-06 DIAGNOSIS — M25.652 DECREASED RANGE OF LEFT HIP MOVEMENT: ICD-10-CM

## 2024-05-06 DIAGNOSIS — R29.898 DECREASED STRENGTH OF LOWER EXTREMITY: Primary | ICD-10-CM

## 2024-05-06 PROCEDURE — 97530 THERAPEUTIC ACTIVITIES: CPT | Mod: PN,CQ

## 2024-05-06 PROCEDURE — 97110 THERAPEUTIC EXERCISES: CPT | Mod: PN,CQ

## 2024-05-06 PROCEDURE — 97116 GAIT TRAINING THERAPY: CPT | Mod: PN,CQ

## 2024-05-06 NOTE — PROGRESS NOTES
"[facility]   Physical Therapy Treatment Note      Name: Pedrito Pitt  Clinic Number: [id number]    Therapy Diagnosis:   Encounter Diagnoses   Name Primary?    Decreased strength of lower extremity Yes    Decreased range of left hip movement        Physician: Kit Vasquez MD    Visit Date: 5/6/2024  Physician Orders: PT Eval and Treat   Medical Diagnosis from Referral:   S72.142A (ICD-10-CM) - Closed intertrochanteric fracture of left femur   S72.142D (ICD-10-CM) - Closed displaced intertrochanteric fracture of left femur with routine healing      Evaluation Date: 3/13/2024  Authorization Period Expiration: 12/31/24  Plan of Care Expiration:5/31/24  Visit # / Visits authorized: 1/1, 15/25  FOTO: 2/3 completed 4/17/24  PTA Visit #: 1/5    Time In: 9:00  Time Out: 9:55  Total Billable Time: 55 minutes (2TA, 1TE, 1 GT )     Precautions: Standard, Fall    Subjective     Patient reports: groin pain comes and goes. Present with certain movements such as getting out of car or flexing hip.   She was compliant with home exercise program.  Response to previous treatment: some soreness through the night   Functional change: able to transition easier in bed, able to raise from public toilet seat    Pain: 0/10  Location: left hip      Objective      Objective Measures updated at progress report unless specified.       Treatment       PEDRITO received the treatments listed below:      therapeutic exercises to develop strength, endurance, ROM, flexibility, posture, and core stabilization for 20 minutes 1:1 including:    Nu step 6' lvl 2   Standing hip abduction 2x10 towel slide bilateral   Standing hip extension 2x10 left towel slide  Cybex knee extension dL x10 5#; sL 2 x 8 0# ea  Bridge 3x10 staggered   Hip abduction isometric 5x30"   Hamstring stretch 3x30"     Not today  Supine hip abduction 2x10 NP  SLS 2x30" bilateral upper extremity support NP  Straight leg raise independent* 2x5- regressed " "due to pain NP  Seated ball squeeze 20x5" NP    PEDRITO participated in gait training to improve functional mobility and safety for 10 minutes 1:1, including:  Gait training with SPC (3 laps) sitting rest break between laps    therapeutic activities to improve functional performance for 25 minutes 1:1, including:  Step up L1 2x10  step to next step   Lateral step up and over 4in 2x10    Side stepping // bars 4 laps   Sit to stand: 2x10 standard chair  Heel raises 3x10 double leg   Standing marches 2x10 in // bars - limited flexion due to pain left   Step taps 2x10 bilateral -no left hip pain       manual therapy techniques: Joint mobilizations were applied to the: left hip for 0 minutes, including:  Hip stretching flexion, abduction, internal rotation/external rotation   LAD grade 1-2  STM hip  Knee extension mobilizations   Patellar mobilizations     Patient Education and Home Exercises       Education provided:   - continue home exercise program     Written Home Exercises Provided: Patient instructed to cont prior HEP. Exercises were reviewed and PEDRITO was able to demonstrate them prior to the end of the session.  PEDRITO demonstrated good  understanding of the education provided. See Electronic Medical Record under Patient Instructions for exercises provided during therapy sessions    Assessment     Pedrito is a 88 y.o. female referred to outpatient Physical Therapy with a medical diagnosis of Closed displaced intertrochanteric fracture of left femur with routine healing. Pt presents 10 weeks s/p left hip ORIF (2/24/24) ambulating with rolling walker. Patient brings her own SPC which was adjusted to proper height in clinic. Showing good ambulation with SPC with good don and no LOB noted. Gradual improvement in tolerance for hip flexion. Will continue to monitor and address gluteal weakness, stair navigation and ambulation with SPC.   Patient lives alone in home with stairs in [place] and is not yet " appropriate for return to her home. She will continue to benefit from skilled PT to improve strength, balance, gait, and mobility to reduce fall risk and allow for return to her home. She will continue to stay with daughters in [place] until appropriate for discharge.     PEDRITO Is progressing well towards her goals.   Patient prognosis is Good.     Patient will continue to benefit from skilled outpatient physical therapy to address the deficits listed in the problem list box on initial evaluation, provide pt/family education and to maximize pt's level of independence in the home and community environment.     Patient's spiritual, cultural and educational needs considered and pt agreeable to plan of care and goals.     Anticipated barriers to physical therapy: age, co-morbidities     Goals:   Short Term Goals (3 Weeks):  1. Pt will be compliant with HEP to supplement PT in restoring pain free function.met  2. Pt will improve impaired LE MMTs to >/= 4/5 to improve dynamic hip/knee support for functional tasks. Progressing, not met  3. Patient will be able to perform active hip flexion to 90 deg to improve transitional movements. Met  4. Patient will be able to ambulate 150 ft to improve community ambulation. met  5. Patient will improve HOOS Jr score by 10% to improve functional mobility. Met     Long Term Goals (6 Weeks):  1. Pt will improve FOTO score to </= 40% limited to decrease perceived limitation with mobility. Progressing, not met  2. Pt will improve impaired LE MMTs to >/= 4+/5 to improve dynamic hip/knee support for functional tasks.Progressing, not met  3. Pt will improve knee flexion ROM to 0-115 deg to improve mobility for functional tasks. Met flexion, progressing ext  4. Patient will be able to ambulate up/down 5 stairs 3x in order to simulate stair navigation in home. Progressing, not met  5. Pt will complete 6mwt with least restrictive assistive device to promote community ambulation.  Progressing,  not met    Plan     Plan of care Certification: 5/31/24 2x/week 5 weeks    Kaia Perez, PTA

## 2024-05-08 ENCOUNTER — CLINICAL SUPPORT (OUTPATIENT)
Dept: REHABILITATION | Facility: HOSPITAL | Age: 89
End: 2024-05-08
Payer: MEDICARE

## 2024-05-08 DIAGNOSIS — M25.652 DECREASED RANGE OF LEFT HIP MOVEMENT: ICD-10-CM

## 2024-05-08 DIAGNOSIS — R29.898 DECREASED STRENGTH OF LOWER EXTREMITY: Primary | ICD-10-CM

## 2024-05-08 PROCEDURE — 97116 GAIT TRAINING THERAPY: CPT | Mod: PN

## 2024-05-08 PROCEDURE — 97110 THERAPEUTIC EXERCISES: CPT | Mod: PN

## 2024-05-08 PROCEDURE — 97530 THERAPEUTIC ACTIVITIES: CPT | Mod: PN

## 2024-05-08 NOTE — PROGRESS NOTES
"[facility]   Physical Therapy Treatment Note      Name: Pedrito Pitt  Clinic Number: [id number]    Therapy Diagnosis:   Encounter Diagnoses   Name Primary?    Decreased strength of lower extremity Yes    Decreased range of left hip movement        Physician: Kit Vasquez MD    Visit Date: 5/8/2024  Physician Orders: PT Eval and Treat   Medical Diagnosis from Referral:   S72.142A (ICD-10-CM) - Closed intertrochanteric fracture of left femur   S72.142D (ICD-10-CM) - Closed displaced intertrochanteric fracture of left femur with routine healing      Evaluation Date: 3/13/2024  Authorization Period Expiration: 12/31/24  Plan of Care Expiration:5/31/24  Visit # / Visits authorized: 1/1, 16/25  FOTO: 2/3 completed 4/17/24  PTA Visit #: 1/5    Time In: 9:02  Time Out: 9:56  Total Billable Time: 54 minutes (2TA, 1TE, 1 GT )     Precautions: Standard, Fall    Subjective     Patient reports: has mild pain in the groin    She was compliant with home exercise program.  Response to previous treatment: some soreness through the night   Functional change: able to transition easier in bed, able to raise from public toilet seat    Pain: 1/10  Location: left hip      Objective      Objective Measures updated at progress report unless specified.       Treatment       PEDRITO received the treatments listed below:      therapeutic exercises to develop strength, endurance, ROM, flexibility, posture, and core stabilization for 20 minutes 1:1 including:    Nu step 6' lvl 2 NP  Standing hip abduction 2x10 1.5# left -cue for hip hike  Standing hip extension 2x10 left 1.5#  Cybex knee extension dL x15 5#; sL 2 x 6 5# ea  Bridge 3x10 staggered   Clamshell 2x10  Hip abduction isometric 5x30"   Hamstring stretch 3x30"     Not today  Supine hip abduction 2x10 NP  SLS 2x30" bilateral upper extremity support NP  Straight leg raise independent* 2x5- regressed due to pain NP  Seated ball squeeze 20x5" HELGA MAOJR " participated in gait training to improve functional mobility and safety for 10 minutes 1:1, including:  Gait training with SPC (3 laps) sitting rest break between laps    therapeutic activities to improve functional performance for 24 minutes 1:1, including:  Step up L1 2x10  step to next step bilateral   Lateral step up and over 4in 2x10  NP  Side stepping // bars 4 laps   Sit to stand: 2x10 standard chair  Heel raises 3x10 double leg NP  Standing marches 2x10 in // bars - limited flexion due to pain left   Step taps 2x10 bilateral -no left hip pain NP      manual therapy techniques: Joint mobilizations were applied to the: left hip for 0 minutes, including:  Hip stretching flexion, abduction, internal rotation/external rotation   LAD grade 1-2  STM hip  Knee extension mobilizations   Patellar mobilizations     Patient Education and Home Exercises       Education provided:   - continue home exercise program     Written Home Exercises Provided: Patient instructed to cont prior HEP. Exercises were reviewed and PEDRITO was able to demonstrate them prior to the end of the session.  PEDRITO demonstrated good  understanding of the education provided. See Electronic Medical Record under Patient Instructions for exercises provided during therapy sessions    Assessment     Pedrito is a 88 y.o. female referred to outpatient Physical Therapy with a medical diagnosis of Closed displaced intertrochanteric fracture of left femur with routine healing. Pt presents 10 weeks s/p left hip ORIF (2/24/24) ambulating with rolling walker. Continued to work on gait with single point cane, left trendelenburg and lacking right knee extension. Overall good sequencing and haylee. Patient continuing to progress functional strength and control. Occasional rest break with ambulation but steady improvement in standing tolerance and endurance.     Patient lives alone in home with stairs in [place] and is not yet appropriate for return to  her home. She will continue to benefit from skilled PT to improve strength, balance, gait, and mobility to reduce fall risk and allow for return to her home. She will continue to stay with daughters in [place] until appropriate for discharge.     PEDRITO Is progressing well towards her goals.   Patient prognosis is Good.     Patient will continue to benefit from skilled outpatient physical therapy to address the deficits listed in the problem list box on initial evaluation, provide pt/family education and to maximize pt's level of independence in the home and community environment.     Patient's spiritual, cultural and educational needs considered and pt agreeable to plan of care and goals.     Anticipated barriers to physical therapy: age, co-morbidities     Goals:   Short Term Goals (3 Weeks):  1. Pt will be compliant with HEP to supplement PT in restoring pain free function.met  2. Pt will improve impaired LE MMTs to >/= 4/5 to improve dynamic hip/knee support for functional tasks. Progressing, not met  3. Patient will be able to perform active hip flexion to 90 deg to improve transitional movements. Met  4. Patient will be able to ambulate 150 ft to improve community ambulation. met  5. Patient will improve HOOS Jr score by 10% to improve functional mobility. Met     Long Term Goals (6 Weeks):  1. Pt will improve FOTO score to </= 40% limited to decrease perceived limitation with mobility. Progressing, not met  2. Pt will improve impaired LE MMTs to >/= 4+/5 to improve dynamic hip/knee support for functional tasks.Progressing, not met  3. Pt will improve knee flexion ROM to 0-115 deg to improve mobility for functional tasks. Met flexion, progressing ext  4. Patient will be able to ambulate up/down 5 stairs 3x in order to simulate stair navigation in home. Progressing, not met  5. Pt will complete 6mwt with least restrictive assistive device to promote community ambulation.  Progressing, not met    Plan     Plan  of care Certification: 5/31/24 2x/week 5 weeks    ANUJ Cruz, PT

## 2024-05-15 ENCOUNTER — CLINICAL SUPPORT (OUTPATIENT)
Dept: REHABILITATION | Facility: HOSPITAL | Age: 89
End: 2024-05-15
Payer: MEDICARE

## 2024-05-15 DIAGNOSIS — R29.898 DECREASED STRENGTH OF LOWER EXTREMITY: Primary | ICD-10-CM

## 2024-05-15 DIAGNOSIS — M25.652 DECREASED RANGE OF LEFT HIP MOVEMENT: ICD-10-CM

## 2024-05-15 PROCEDURE — 97530 THERAPEUTIC ACTIVITIES: CPT | Mod: KX,PN

## 2024-05-15 PROCEDURE — 97116 GAIT TRAINING THERAPY: CPT | Mod: KX,PN

## 2024-05-15 PROCEDURE — 97110 THERAPEUTIC EXERCISES: CPT | Mod: KX,PN

## 2024-05-15 NOTE — PROGRESS NOTES
"[facility]   Physical Therapy Treatment Note      Name: Pedrito Pitt  Clinic Number: [id number]    Therapy Diagnosis:   Encounter Diagnoses   Name Primary?    Decreased strength of lower extremity Yes    Decreased range of left hip movement        Physician: Kit Vasquez MD    Visit Date: 5/15/2024  Physician Orders: PT Eval and Treat   Medical Diagnosis from Referral:   S72.142A (ICD-10-CM) - Closed intertrochanteric fracture of left femur   S72.142D (ICD-10-CM) - Closed displaced intertrochanteric fracture of left femur with routine healing      Evaluation Date: 3/13/2024  Authorization Period Expiration: 12/31/24  Plan of Care Expiration:5/31/24  Visit # / Visits authorized: 1/1, 16/25  FOTO: 2/3 completed 4/17/24  PTA Visit #: 1/5    Time In: 10:05  Time Out: 10:58  Total Billable Time: 53 minutes (2TA, 1TE, 1 GT )     Precautions: Standard, Fall    Subjective     Patient reports: no groin pain at present.  Returns to surgeon tomorrow.   She was compliant with home exercise program.  Response to previous treatment: some soreness through the night   Functional change: able to transition easier in bed, able to raise from public toilet seat    Pain: 0/10  Location: left hip      Objective      Objective Measures updated at progress report unless specified.     left trendelenburg, decreased right knee extension in gait  Treatment       PEDRITO received the treatments listed below:      therapeutic exercises to develop strength, endurance, ROM, flexibility, posture, and core stabilization for 20 minutes 1:1 including:    Nu step 6' lvl 2 NP  Standing hip abduction 2x10 1.5# left -cue for hip hike  Standing hip extension 2x10 left 1.5#  Cybex knee extension dL x15 15#; sL 2 x 6-8 5# ea  Bridge 3x10 staggered   Clamshell 2x10  Hip abduction isometric 5x30"   Hamstring stretch 3x30"   Straight leg raise  2x5  Hamstring curl 3 plates 2x10     Not today  Supine hip abduction 2x10 NP  SLS " "2x30" bilateral upper extremity support NP  Straight leg raise independent* 2x5  Seated ball squeeze 20x5" NP    PEDRITO participated in gait training to improve functional mobility and safety for 10 minutes 1:1, including:  Gait training with SPC (3 laps) standing rest break between laps    therapeutic activities to improve functional performance for 23 minutes 1:1, including:  Step up L1 2x10  step to next step bilateral   Lateral step up and over 4in 2x10  NP  Side stepping // bars 4 laps   Sit to stand: 3x10 standard chair  Heel raises 3x10 double leg   Standing marches 2x10 in // bars    Step taps 2x10 bilateral       manual therapy techniques: Joint mobilizations were applied to the: left hip for 0 minutes, including:  Hip stretching flexion, abduction, internal rotation/external rotation   LAD grade 1-2  STM hip  Knee extension mobilizations   Patellar mobilizations     Patient Education and Home Exercises       Education provided:   - continue home exercise program     Written Home Exercises Provided: Patient instructed to cont prior HEP. Exercises were reviewed and PEDRITO was able to demonstrate them prior to the end of the session.  PEDRITO demonstrated good  understanding of the education provided. See Electronic Medical Record under Patient Instructions for exercises provided during therapy sessions    Assessment     Pedrito is a 88 y.o. female referred to outpatient Physical Therapy with a medical diagnosis of Closed displaced intertrochanteric fracture of left femur with routine healing. Pt presents 10 weeks s/p left hip ORIF (2/24/24) ambulating with rolling walker. Continued to work on gait with single point cane, left trendelenburg and lacking right knee extension. Overall good sequencing and haylee. Decreased rest breaks required. Unable to perform forward step up with one rail. Able to perform lateral step up with bilateral upper extremity assist. Added additional lower extremity resistance " training with good tolerance. Limited single leg stability. Continue to progress as tolerated.    Patient lives alone in home with stairs in [place] and is not yet appropriate for return to her home. She will continue to benefit from skilled PT to improve strength, balance, gait, and mobility to reduce fall risk and allow for return to her home. She will continue to stay with daughters in [place] until appropriate for discharge.     PEDRITO Is progressing well towards her goals.   Patient prognosis is Good.     Patient will continue to benefit from skilled outpatient physical therapy to address the deficits listed in the problem list box on initial evaluation, provide pt/family education and to maximize pt's level of independence in the home and community environment.     Patient's spiritual, cultural and educational needs considered and pt agreeable to plan of care and goals.     Anticipated barriers to physical therapy: age, co-morbidities     Goals:   Short Term Goals (3 Weeks):  1. Pt will be compliant with HEP to supplement PT in restoring pain free function.met  2. Pt will improve impaired LE MMTs to >/= 4/5 to improve dynamic hip/knee support for functional tasks. Progressing, not met  3. Patient will be able to perform active hip flexion to 90 deg to improve transitional movements. Met  4. Patient will be able to ambulate 150 ft to improve community ambulation. met  5. Patient will improve HOOS Jr score by 10% to improve functional mobility. Met     Long Term Goals (6 Weeks):  1. Pt will improve FOTO score to </= 40% limited to decrease perceived limitation with mobility. Progressing, not met  2. Pt will improve impaired LE MMTs to >/= 4+/5 to improve dynamic hip/knee support for functional tasks.Progressing, not met  3. Pt will improve knee flexion ROM to 0-115 deg to improve mobility for functional tasks. Met flexion, progressing ext  4. Patient will be able to ambulate up/down 5 stairs 3x in order to  simulate stair navigation in home. Progressing, not met  5. Pt will complete 6mwt with least restrictive assistive device to promote community ambulation.  Progressing, not met    Plan     Plan of care Certification: 5/31/24 2x/week 5 weeks    ANUJ Cruz, PT

## 2024-05-20 ENCOUNTER — CLINICAL SUPPORT (OUTPATIENT)
Dept: REHABILITATION | Facility: HOSPITAL | Age: 89
End: 2024-05-20
Payer: MEDICARE

## 2024-05-20 DIAGNOSIS — M25.652 DECREASED RANGE OF LEFT HIP MOVEMENT: ICD-10-CM

## 2024-05-20 DIAGNOSIS — R29.898 DECREASED STRENGTH OF LOWER EXTREMITY: Primary | ICD-10-CM

## 2024-05-20 PROCEDURE — 97530 THERAPEUTIC ACTIVITIES: CPT | Mod: PN,CQ

## 2024-05-20 NOTE — PROGRESS NOTES
"[facility]   Physical Therapy Treatment Note      Name: Pedrito Pitt  Clinic Number: [id number]    Therapy Diagnosis:   Encounter Diagnoses   Name Primary?    Decreased strength of lower extremity Yes    Decreased range of left hip movement        Physician: Kit Vasquez MD    Visit Date: 5/20/2024  Physician Orders: PT Eval and Treat   Medical Diagnosis from Referral:   S72.142A (ICD-10-CM) - Closed intertrochanteric fracture of left femur   S72.142D (ICD-10-CM) - Closed displaced intertrochanteric fracture of left femur with routine healing      Evaluation Date: 3/13/2024  Authorization Period Expiration: 12/31/24  Plan of Care Expiration:5/31/24  Visit # / Visits authorized: 1/1, 18/25  FOTO: 2/3 completed 4/17/24  PTA Visit #: 1/5    Time In: 10:00  Time Out: 10:55  Total Billable Time: 30 minutes 1:1 (2TA )     Precautions: Standard, Fall    Subjective     Patient reports: went to surgeon recently, "They fired me!". states MD is please, incision was healing well, told to continue with PT.   She was compliant with home exercise program.  Response to previous treatment: some soreness through the night   Functional change: able to transition easier in bed, able to raise from public toilet seat    Pain: 0/10  Location: left hip      Objective      Objective Measures updated at progress report unless specified.     left trendelenburg, decreased right knee extension in gait  Treatment       PEDRITO received the treatments listed below:      therapeutic exercises to develop strength, endurance, ROM, flexibility, posture, and core stabilization for 5 minutes 1:1 including:    Nu step 6' lvl 2   Standing hip abduction 2x10 1.5# left -cue for hip hike  Standing hip extension 2x10 left 1.5#  Cybex knee extension dL x15 15#; sL 2 x 6-8 5# ea  Bridge 3x10 staggered   Clamshell 2x10  Hip abduction isometric 5x30"   Hamstring stretch 3x30"   Straight leg raise 1x5!  Hamstring curl 3 plates " "2x10     Not today  Supine hip abduction 2x10 NP  SLS 2x30" bilateral upper extremity support NP  Straight leg raise independent* 2x5  Seated ball squeeze 20x5" NP    PEDRITO participated in gait training to improve functional mobility and safety for 00 minutes 1:1, including:  Gait training with SPC (3 laps) standing rest break between laps    therapeutic activities to improve functional performance for 25 minutes 1:1, including:  Step up L1 2x10  step to next step bilateral   Lateral step up and over 4in 2x10  NP  Side stepping // bars 4 laps   Sit to stand: 3x10 standard chair  Heel raises 3x10 double leg   Standing marches 2x10 in // bars    Step taps 2x10 bilateral       manual therapy techniques: Join mobilizations were applied to the: left hip for 0 minutes, including:  Hip stretching flexion, abduction, internal rotation/external rotation   LAD grade 1-2  STM hip  Knee extension mobilizations   Patellar mobilizations     Patient Education and Home Exercises       Education provided:   - continue home exercise program     Written Home Exercises Provided: Patient instructed to cont prior HEP. Exercises were reviewed and PEDRITO was able to demonstrate them prior to the end of the session.  PEDRITO demonstrated good  understanding of the education provided. See Electronic Medical Record under Patient Instructions for exercises provided during therapy sessions    Assessment     Pedrito is a 88 y.o. female referred to outpatient Physical Therapy with a medical diagnosis of Closed displaced intertrochanteric fracture of left femur with routine healing. Pt presents 12 weeks s/p left hip ORIF (2/24/24) ambulating with SPC. Patient reports that she began use of SPC about 4 days ago. Denies any fall or LOB since use of SPC. Still uses RW at night around home. Good use of SPC in clinic with good sequencing and haylee. Improving endurance, less rest breaks required. Continue to progress as tolerated.    Patient " lives alone in home with stairs in [place] and is not yet appropriate for return to her home. She will continue to benefit from skilled PT to improve strength, balance, gait, and mobility to reduce fall risk and allow for return to her home. She will continue to stay with daughters in [place] until appropriate for discharge.     PEDRITO Is progressing well towards her goals.   Patient prognosis is Good.     Patient will continue to benefit from skilled outpatient physical therapy to address the deficits listed in the problem list box on initial evaluation, provide pt/family education and to maximize pt's level of independence in the home and community environment.     Patient's spiritual, cultural and educational needs considered and pt agreeable to plan of care and goals.     Anticipated barriers to physical therapy: age, co-morbidities     Goals:   Short Term Goals (3 Weeks):  1. Pt will be compliant with HEP to supplement PT in restoring pain free function.met  2. Pt will improve impaired LE MMTs to >/= 4/5 to improve dynamic hip/knee support for functional tasks. Progressing, not met  3. Patient will be able to perform active hip flexion to 90 deg to improve transitional movements. Met  4. Patient will be able to ambulate 150 ft to improve community ambulation. met  5. Patient will improve HOOS Jr score by 10% to improve functional mobility. Met     Long Term Goals (6 Weeks):  1. Pt will improve FOTO score to </= 40% limited to decrease perceived limitation with mobility. Progressing, not met  2. Pt will improve impaired LE MMTs to >/= 4+/5 to improve dynamic hip/knee support for functional tasks.Progressing, not met  3. Pt will improve knee flexion ROM to 0-115 deg to improve mobility for functional tasks. Met flexion, progressing ext  4. Patient will be able to ambulate up/down 5 stairs 3x in order to simulate stair navigation in home. Progressing, not met  5. Pt will complete 6mwt with least restrictive  assistive device to promote community ambulation.  Progressing, not met    Plan     Plan of care Certification: 5/31/24 2x/week 5 weeks    Kaia Perez, PTA

## 2024-05-22 ENCOUNTER — CLINICAL SUPPORT (OUTPATIENT)
Dept: REHABILITATION | Facility: HOSPITAL | Age: 89
End: 2024-05-22
Payer: MEDICARE

## 2024-05-22 DIAGNOSIS — R29.898 DECREASED STRENGTH OF LOWER EXTREMITY: Primary | ICD-10-CM

## 2024-05-22 DIAGNOSIS — M25.652 DECREASED RANGE OF LEFT HIP MOVEMENT: ICD-10-CM

## 2024-05-22 PROCEDURE — 97110 THERAPEUTIC EXERCISES: CPT | Mod: PN

## 2024-05-22 PROCEDURE — 97530 THERAPEUTIC ACTIVITIES: CPT | Mod: PN

## 2024-05-22 NOTE — PROGRESS NOTES
"[facility]   Physical Therapy Treatment Note      Name: Pedrito Pitt  Clinic Number: [id number]    Therapy Diagnosis:   Encounter Diagnoses   Name Primary?    Decreased strength of lower extremity Yes    Decreased range of left hip movement      Physician: Kit Vasquez MD    Visit Date: 5/22/2024  Physician Orders: PT Eval and Treat   Medical Diagnosis from Referral:   S72.142A (ICD-10-CM) - Closed intertrochanteric fracture of left femur   S72.142D (ICD-10-CM) - Closed displaced intertrochanteric fracture of left femur with routine healing      Evaluation Date: 3/13/2024  Authorization Period Expiration: 12/31/24  Plan of Care Expiration:5/31/24  Visit # / Visits authorized: 1/1, 19/25  FOTO: 2/3 completed 4/17/24  PTA Visit #: 1/5    Time In: 10:00  Time Out: 10:48  Total Billable Time: 48 minutes 1:1 (2TA, 1TE)     Precautions: Standard, Fall    Subjective     Patient reports: she is doing well with transition to cane. No complaints of pain today  She was compliant with home exercise program.  Response to previous treatment: some soreness through the night   Functional change: able to transition easier in bed, able to raise from public toilet seat    Pain: 0/10  Location: left hip      Objective      Objective Measures updated at progress report unless specified.     left trendelenburg, decreased right knee extension in gait  Treatment       PEDRITO received the treatments listed below:      therapeutic exercises to develop strength, endurance, ROM, flexibility, posture, and core stabilization for 23 minutes 1:1 including:    Nu step 6' lvl 2   Standing hip abduction 2x10 1.5# left -cue for hip hike  Standing hip extension 2x10 left 1.5# left only  Cybex knee extension dL x15 15#; sL 2 x 6-8 5# ea  Bridge 3x10 staggered bilateral   Clamshell 2x10 bilateral   Hip abduction isometric 5x30"   Hamstring stretch 3x30" P  Straight leg raise 1x5! Bilateral   Hamstring curl 3 plates 2x10 " "    Not today  Supine hip abduction 2x10 NP  SLS 2x30" bilateral upper extremity support NP  Straight leg raise independent* 2x5  Seated ball squeeze 20x5" NP    PEDRITO participated in gait training to improve functional mobility and safety for 00 minutes 1:1, including:  Gait training with SPC (3 laps) standing rest break between laps    therapeutic activities to improve functional performance for 25 minutes 1:1, including:  Step up stairs 1x10  step to next step bilateral, 1 x up/down stairs  Lateral step up and over 4in 2x10  NP  Side stepping // bars 4 laps   Sit to stand: 2x10 standard chair (table today) 1x10 staggered   Heel raises 3x10 double leg   Standing marches 2x10 in // bars    Step taps 2x10 bilateral   Monique step overs 10x     manual therapy techniques: Join mobilizations were applied to the: left hip for 0 minutes, including:  Hip stretching flexion, abduction, internal rotation/external rotation   LAD grade 1-2  STM hip  Knee extension mobilizations   Patellar mobilizations     Patient Education and Home Exercises       Education provided:   - continue home exercise program     Written Home Exercises Provided: Patient instructed to cont prior HEP. Exercises were reviewed and PEDRITO was able to demonstrate them prior to the end of the session.  PEDRITO demonstrated good  understanding of the education provided. See Electronic Medical Record under Patient Instructions for exercises provided during therapy sessions    Assessment     Pedrito is a 88 y.o. female referred to outpatient Physical Therapy with a medical diagnosis of Closed displaced intertrochanteric fracture of left femur with routine healing. Pt presents 13 weeks s/p left hip ORIF (2/24/24) ambulating with SPC. Patient doing well with transition to single point cane.continues to have pain with straight leg raise. Increased step height with good tolerance but easier fatigue. Continues to have some pain with single leg stance. " Continue to progress as tolerated.    Patient lives alone in home with stairs in [place] and is not yet appropriate for return to her home. She will continue to benefit from skilled PT to improve strength, balance, gait, and mobility to reduce fall risk and allow for return to her home. She will continue to stay with daughters in [place] until appropriate for discharge.     PEDRITO Is progressing well towards her goals.   Patient prognosis is Good.     Patient will continue to benefit from skilled outpatient physical therapy to address the deficits listed in the problem list box on initial evaluation, provide pt/family education and to maximize pt's level of independence in the home and community environment.     Patient's spiritual, cultural and educational needs considered and pt agreeable to plan of care and goals.     Anticipated barriers to physical therapy: age, co-morbidities     Goals:   Short Term Goals (3 Weeks):  1. Pt will be compliant with HEP to supplement PT in restoring pain free function.met  2. Pt will improve impaired LE MMTs to >/= 4/5 to improve dynamic hip/knee support for functional tasks. Progressing, not met  3. Patient will be able to perform active hip flexion to 90 deg to improve transitional movements. Met  4. Patient will be able to ambulate 150 ft to improve community ambulation. met  5. Patient will improve HOOS Jr score by 10% to improve functional mobility. Met     Long Term Goals (6 Weeks):  1. Pt will improve FOTO score to </= 40% limited to decrease perceived limitation with mobility. Progressing, not met  2. Pt will improve impaired LE MMTs to >/= 4+/5 to improve dynamic hip/knee support for functional tasks.Progressing, not met  3. Pt will improve knee flexion ROM to 0-115 deg to improve mobility for functional tasks. Met flexion, progressing ext  4. Patient will be able to ambulate up/down 5 stairs 3x in order to simulate stair navigation in home. Progressing, not met  5.  Pt will complete 6mwt with least restrictive assistive device to promote community ambulation.  Progressing, not met    Plan     Plan of care Certification: 5/31/24 2x/week 5 weeks    ANUJ Cruz, PT

## 2024-05-27 ENCOUNTER — CLINICAL SUPPORT (OUTPATIENT)
Dept: REHABILITATION | Facility: HOSPITAL | Age: 89
End: 2024-05-27
Payer: MEDICARE

## 2024-05-27 DIAGNOSIS — M25.652 DECREASED RANGE OF LEFT HIP MOVEMENT: ICD-10-CM

## 2024-05-27 DIAGNOSIS — R29.898 DECREASED STRENGTH OF LOWER EXTREMITY: Primary | ICD-10-CM

## 2024-05-27 PROCEDURE — 97530 THERAPEUTIC ACTIVITIES: CPT | Mod: KX,PN

## 2024-05-27 PROCEDURE — 97110 THERAPEUTIC EXERCISES: CPT | Mod: KX,PN

## 2024-05-27 NOTE — PROGRESS NOTES
"[facility]   Physical Therapy Treatment Note      Name: Pedrito Pitt  Clinic Number: [id number]    Therapy Diagnosis:   Encounter Diagnoses   Name Primary?    Decreased strength of lower extremity Yes    Decreased range of left hip movement      Physician: Kit Vasquez MD    Visit Date: 5/27/2024  Physician Orders: PT Eval and Treat   Medical Diagnosis from Referral:   S72.142A (ICD-10-CM) - Closed intertrochanteric fracture of left femur   S72.142D (ICD-10-CM) - Closed displaced intertrochanteric fracture of left femur with routine healing      Evaluation Date: 3/13/2024  Authorization Period Expiration: 12/31/24  Plan of Care Expiration:5/31/24  Visit # / Visits authorized: 1/1, 19/25  FOTO: 2/3 completed 4/17/24  PTA Visit #: 1/5    Time In: 10:00  Time Out: 10:58  Total Billable Time: 30 minutes 1:1 (1TA, 1TE)     Precautions: Standard, Fall    Subjective     Patient reports: no pain today.   She was compliant with home exercise program.  Response to previous treatment: some soreness through the night   Functional change: able to transition easier in bed, able to raise from public toilet seat    Pain: 0/10  Location: left hip      Objective      Objective Measures updated at progress report unless specified.     left trendelenburg, decreased right knee extension in gait  Treatment       PEDRITO received the treatments listed below:      therapeutic exercises to develop strength, endurance, ROM, flexibility, posture, and core stabilization for 15 minutes 1:1 including:    Nu step 6' lvl 2   Standing hip abduction 2x10 1.5# left -cue for hip hike  Standing hip extension 2x10 left 1.5# left only  Cybex knee extension dL x15 15#; sL 2 x 6-8 5# ea  Bridge 3x10 staggered bilateral   Clamshell 2x10 bilateral   Hip abduction isometric 5x30"   Hamstring stretch 3x30" P  Straight leg raise 1x5! Bilateral   Hamstring curl 3 plates 2x10     Not today  Supine hip abduction 2x10 NP  SLS 2x30" " "bilateral upper extremity support NP  Straight leg raise independent* 2x5  Seated ball squeeze 20x5" NP    PEDRITO participated in gait training to improve functional mobility and safety for 00 minutes 1:1, including:  Gait training with SPC (3 laps) standing rest break between laps    therapeutic activities to improve functional performance for 15 minutes 1:1, including:  Step up stairs 1x10  step to next step bilateral, 1 x up/down stairs  Lateral step up and over 4in 2x10  NP  Side stepping // bars 4 laps   Sit to stand: 2x10 standard chair 1x10 staggered   Heel raises 3x10 double leg   Standing marches 2x10 in // bars    Step taps 2x10 bilateral   Monique step overs 20x     manual therapy techniques: Join mobilizations were applied to the: left hip for 0 minutes, including:  Hip stretching flexion, abduction, internal rotation/external rotation   LAD grade 1-2  STM hip  Knee extension mobilizations   Patellar mobilizations     Patient Education and Home Exercises       Education provided:   - continue home exercise program     Written Home Exercises Provided: Patient instructed to cont prior HEP. Exercises were reviewed and PEDRITO was able to demonstrate them prior to the end of the session.  PEDRITO demonstrated good  understanding of the education provided. See Electronic Medical Record under Patient Instructions for exercises provided during therapy sessions    Assessment     Pedrito is a 88 y.o. female referred to outpatient Physical Therapy with a medical diagnosis of Closed displaced intertrochanteric fracture of left femur with routine healing. Pt presents 13 weeks s/p left hip ORIF (2/24/24) ambulating with SPC.  Patient tolerated session well. Continuing to progress standing activities. Some pain with single leg stance. Min fatigue post treatment. Continue to progress as tolerated. Plan of care next. Will likely recommend additional treatment to continue to progress functional strength.     Patient " lives alone in home with stairs in [place] and is not yet appropriate for return to her home. She will continue to benefit from skilled PT to improve strength, balance, gait, and mobility to reduce fall risk and allow for return to her home. She will continue to stay with daughters in [place] until appropriate for discharge.     PEDRITO Is progressing well towards her goals.   Patient prognosis is Good.     Patient will continue to benefit from skilled outpatient physical therapy to address the deficits listed in the problem list box on initial evaluation, provide pt/family education and to maximize pt's level of independence in the home and community environment.     Patient's spiritual, cultural and educational needs considered and pt agreeable to plan of care and goals.     Anticipated barriers to physical therapy: age, co-morbidities     Goals:   Short Term Goals (3 Weeks):  1. Pt will be compliant with HEP to supplement PT in restoring pain free function.met  2. Pt will improve impaired LE MMTs to >/= 4/5 to improve dynamic hip/knee support for functional tasks. Progressing, not met  3. Patient will be able to perform active hip flexion to 90 deg to improve transitional movements. Met  4. Patient will be able to ambulate 150 ft to improve community ambulation. met  5. Patient will improve HOOS Jr score by 10% to improve functional mobility. Met     Long Term Goals (6 Weeks):  1. Pt will improve FOTO score to </= 40% limited to decrease perceived limitation with mobility. Progressing, not met  2. Pt will improve impaired LE MMTs to >/= 4+/5 to improve dynamic hip/knee support for functional tasks.Progressing, not met  3. Pt will improve knee flexion ROM to 0-115 deg to improve mobility for functional tasks. Met flexion, progressing ext  4. Patient will be able to ambulate up/down 5 stairs 3x in order to simulate stair navigation in home. Progressing, not met  5. Pt will complete 6mwt with least restrictive  assistive device to promote community ambulation.  Progressing, not met    Plan     Plan of care Certification: 5/31/24 2x/week 5 weeks    ANUJ Cruz, PT

## 2024-05-29 ENCOUNTER — CLINICAL SUPPORT (OUTPATIENT)
Dept: REHABILITATION | Facility: HOSPITAL | Age: 89
End: 2024-05-29
Payer: MEDICARE

## 2024-05-29 DIAGNOSIS — M25.652 DECREASED RANGE OF LEFT HIP MOVEMENT: ICD-10-CM

## 2024-05-29 DIAGNOSIS — R29.898 DECREASED STRENGTH OF LOWER EXTREMITY: Primary | ICD-10-CM

## 2024-05-29 PROCEDURE — 97110 THERAPEUTIC EXERCISES: CPT | Mod: PN

## 2024-05-29 PROCEDURE — 97530 THERAPEUTIC ACTIVITIES: CPT | Mod: PN

## 2024-05-29 NOTE — PROGRESS NOTES
[facility]   Physical Therapy Treatment Note / plan of care      Name: Sola Pitt  Clinic Number: [id number]    Therapy Diagnosis:   Encounter Diagnoses   Name Primary?    Decreased strength of lower extremity Yes    Decreased range of left hip movement      Physician: Kit Vasquez MD    Visit Date: 5/29/2024  Physician Orders: PT Eval and Treat   Medical Diagnosis from Referral:   S72.142A (ICD-10-CM) - Closed intertrochanteric fracture of left femur   S72.142D (ICD-10-CM) - Closed displaced intertrochanteric fracture of left femur with routine healing      Evaluation Date: 3/13/2024  Authorization Period Expiration: 12/31/24  Plan of Care Expiration:5/31/24, 6/28/24  Visit # / Visits authorized: 1/1, 21/25  FOTO: 2/3 completed 4/17/24  PTA Visit #: 1/5    Time In: 10:00  Time Out: 10:58  Total Billable Time: 58 minutes 1:1 (1TA, 1TE)     Precautions: Standard, Fall    Subjective     Patient reports: no pain today.   She was compliant with home exercise program.  Response to previous treatment: some soreness through the night   Functional change: able to transition easier in bed, able to raise from public toilet seat    Pain: 0/10  Location: left hip      Objective    left trendelenburg, decreased right knee extension in gait    6MWT:680 ft with single point cane 1 standing rest break    Posture: slight trunk flexion, decreased knee extension   Sensation: normal     Range of Motion/Strength:      Hip Right Left Pain/Dysfunction with Movement   AROM/PROM         flexion  116  116      extension  Not tested   Not tested      abduction  50  45     Internal rotation  45  35     External rotation  45  45        Knee Right Left Pain/Dysfunction with Movement   AROM/PROM         flexion  128  130 Left knee crepitus    extension  9  6           L/E MMT Right Left Pain/Dysfunction with Movement   Hip Flexion 5/5 4-/5     Hip Extension 4-/5 4-/5  25% raise single leg bridge, good double leg  "bridge   Hip Abduction 4+/5 4-/5     Hip Adduction 5/5 4+/5     Hip ER 4+/5 4+/5     Knee Flexion 5/5 4/5     Knee Extension 5/5 4+/5 Left knee pop   Ankle DF 5/5 5/5     Ankle PF 5/5 5/5           Joint Mobility:   Hip: not tested   Knee: decreased patellar mobility all directions left      Flexibility: 45 deg straight leg raise left, within normal limits right      Gait Analysis:With AD.  Device Used -  Rolling walker Assistance ind Deviations: limited hip extension and step length, decreased hip flexion and knee flexion    Stairs: able to alternate with use of 1 hand rail right no pain, unable to perform step up with left side hand rail. Able to reciprocally descend with 1 hand rail      TU.6 with single point cane   TUG Cutoff Scores:13.5          30 second sit to stand: 12x with upper extremity support     CMS Impairment/Limitation/Restriction for FOTO hip Survey     Therapist reviewed FOTO scores for Pedrito Pitt on 2024.   FOTO documents entered into [employer] - see Media section.     Limitation Score: 27%  Category: Mobility     Treatment       PEDRITO received the treatments listed below:      therapeutic exercises to develop strength, endurance, ROM, flexibility, posture, and core stabilization for 28 minutes 1:1 including:  Objective measures  Nu step 6' lvl 2 NP  Standing hip abduction 2x10 1.5# left -cue for hip hike   Standing hip extension 2x10 left 1.5# left only  Cybex knee extension dL x15 15#; sL 2 x 6-8 5# ea  Bridge 3x10 staggered bilateral  NP  Clamshell 2x10 bilateral NP  Hip abduction isometric 5x30" NP   Hamstring stretch 3x30" NP  Straight leg raise 1x5! Bilateral NP  Hamstring curl 3 plates 2x10 NP    Not today  Supine hip abduction 2x10 NP  SLS 2x30" bilateral upper extremity support NP  Straight leg raise independent* 2x5  Seated ball squeeze 20x5" HELGA    PEDRITO participated in gait training to improve functional mobility and safety for 00 minutes 1:1, including:  Gait " training with SPC (3 laps) standing rest break between laps    therapeutic activities to improve functional performance for 30 minutes 1:1, including:  Step up stairs 1x10  step to next step bilateral, 1 x up/down stairs  Lateral step up and over 4in 2x10  NP  Side stepping // bars 4 laps   Sit to stand: 2x10 standard chair 1x10 staggered   Heel raises 3x10 double leg   Standing marches 2x10 in // bars    Step taps 2x10 bilateral   Monique step overs 20x   Stair training 3x 5 steps with rail    Patient Education and Home Exercises       Education provided:   - continue home exercise program     Written Home Exercises Provided: Patient instructed to cont prior HEP. Exercises were reviewed and PEDRITO was able to demonstrate them prior to the end of the session.  PEDRITO demonstrated good  understanding of the education provided. See Electronic Medical Record under Patient Instructions for exercises provided during therapy sessions    Assessment     Pedrito is a 88 y.o. female referred to outpatient Physical Therapy with a medical diagnosis of Closed displaced intertrochanteric fracture of left femur with routine healing. Pt presents 13 weeks s/p left hip ORIF (2/24/24) ambulating with SPC.  Patient with minimal complaints of hip pain. Patient demonstrated hip range of motion within normal limits and progressing strength and functional mobility. Primary remaining deficits are hip flexion and gluteal strength and especially ability to maintain level pelvis in single leg stance. Able to navigate stairs with right side hand rail reciprocally but unable due to weakness when performed with only left side support. Good performance of sit to stand and near met timed up and go goals. Progressing with ambulation with single point cane, increasing endurance. Patient may be moving to adult community instead of returning home. She will continue to benefit from PT to address remaining hip strength deficits and improve balance  and functional mobility and reduce fall risk prior to discharged.     PEDRITO Is progressing well towards her goals.   Patient prognosis is Good.     Patient will continue to benefit from skilled outpatient physical therapy to address the deficits listed in the problem list box on initial evaluation, provide pt/family education and to maximize pt's level of independence in the home and community environment.     Patient's spiritual, cultural and educational needs considered and pt agreeable to plan of care and goals.     Anticipated barriers to physical therapy: age, co-morbidities     Goals:   Short Term Goals (3 Weeks):  1. Pt will be compliant with HEP to supplement PT in restoring pain free function.met  2. Pt will improve impaired LE MMTs to >/= 4/5 to improve dynamic hip/knee support for functional tasks. Progressing, not met  3. Patient will be able to perform active hip flexion to 90 deg to improve transitional movements. Met  4. Patient will be able to ambulate 150 ft to improve community ambulation. met  5. Patient will improve HOOS Jr score by 10% to improve functional mobility. Met     Long Term Goals (6 Weeks):  1. Pt will improve FOTO score to </= 40% limited to decrease perceived limitation with mobility. met  2. Pt will improve impaired LE MMTs to >/= 4+/5 to improve dynamic hip/knee support for functional tasks.Progressing, not met  3. Pt will improve knee flexion ROM to 0-115 deg to improve mobility for functional tasks. Met flexion, progressing ext- dc goal   4. Patient will be able to ambulate up/down 5 stairs 3x in order to simulate stair navigation in home. Progressing, not met  5. Pt will complete 6mwt with least restrictive assistive device to promote community ambulation.  Progressing, not met    Plan     Plan of care Certification: 5/29/24-6/28/24 2x/week 4 weeks    ANUJ Cruz, PT

## 2024-05-29 NOTE — PLAN OF CARE
[facility]   Physical Therapy plan of care      Name: Sola Pitt  Clinic Number: [id number]    Therapy Diagnosis:   Encounter Diagnoses   Name Primary?    Decreased strength of lower extremity Yes    Decreased range of left hip movement      Physician: Kit Vasquez MD    Visit Date: 5/29/2024  Physician Orders: PT Eval and Treat   Medical Diagnosis from Referral:   S72.142A (ICD-10-CM) - Closed intertrochanteric fracture of left femur   S72.142D (ICD-10-CM) - Closed displaced intertrochanteric fracture of left femur with routine healing      Evaluation Date: 3/13/2024  Authorization Period Expiration: 12/31/24  Plan of Care Expiration:5/31/24, 6/28/24  Visit # / Visits authorized: 1/1, 21/25  FOTO: 2/3 completed 4/17/24  PTA Visit #: 1/5    Time In: 10:00  Time Out: 10:58  Total Billable Time: 58 minutes 1:1 (1TA, 1TE)     Precautions: Standard, Fall    Subjective     Patient reports: no pain today.   She was compliant with home exercise program.  Response to previous treatment: some soreness through the night   Functional change: able to transition easier in bed, able to raise from public toilet seat    Pain: 0/10  Location: left hip      Objective    left trendelenburg, decreased right knee extension in gait    6MWT:680 ft with single point cane 1 standing rest break    Posture: slight trunk flexion, decreased knee extension   Sensation: normal     Range of Motion/Strength:      Hip Right Left Pain/Dysfunction with Movement   AROM/PROM         flexion  116  116      extension  Not tested   Not tested      abduction  50  45     Internal rotation  45  35     External rotation  45  45        Knee Right Left Pain/Dysfunction with Movement   AROM/PROM         flexion  128  130 Left knee crepitus    extension  9  6           L/E MMT Right Left Pain/Dysfunction with Movement   Hip Flexion 5/5 4-/5     Hip Extension 4-/5 4-/5  25% raise single leg bridge, good double leg bridge   Hip  Abduction 4+/5 4-/5     Hip Adduction 5/5 4+/5     Hip ER 4+/5 4+/5     Knee Flexion 5/5 4/5     Knee Extension 5/5 4+/5 Left knee pop   Ankle DF 5/5 5/5     Ankle PF 5/5 5/5           Joint Mobility:   Hip: not tested   Knee: decreased patellar mobility all directions left      Flexibility: 45 deg straight leg raise left, within normal limits right      Gait Analysis:With AD.  Device Used -  Rolling walker Assistance ind Deviations: limited hip extension and step length, decreased hip flexion and knee flexion    Stairs: able to alternate with use of 1 hand rail right no pain, unable to perform step up with left side hand rail. Able to reciprocally descend with 1 hand rail      TU.6 with single point cane   TUG Cutoff Scores:13.5          30 second sit to stand: 12x with upper extremity support     CMS Impairment/Limitation/Restriction for FOTO hip Survey     Therapist reviewed FOTO scores for Pedrito Pitt on 2024.   FOTO documents entered into [employer] - see Media section.     Limitation Score: 27%  Category: Mobility     Treatment       PEDRITO received the treatments listed in daily note  Patient Education and Home Exercises       Education provided:   - continue home exercise program     Written Home Exercises Provided: Patient instructed to cont prior HEP. Exercises were reviewed and PEDRITO was able to demonstrate them prior to the end of the session.  PEDRITO demonstrated good  understanding of the education provided. See Electronic Medical Record under Patient Instructions for exercises provided during therapy sessions    Assessment     Pedrito is a 88 y.o. female referred to outpatient Physical Therapy with a medical diagnosis of Closed displaced intertrochanteric fracture of left femur with routine healing. Pt presents 13 weeks s/p left hip ORIF (24) ambulating with SPC.  Patient with minimal complaints of hip pain. Patient demonstrated hip range of motion within normal limits and  progressing strength and functional mobility. Primary remaining deficits are hip flexion and gluteal strength and especially ability to maintain level pelvis in single leg stance. Able to navigate stairs with right side hand rail reciprocally but unable due to weakness when performed with only left side support. Good performance of sit to stand and near met timed up and go goals. Progressing with ambulation with single point cane, increasing endurance. Patient may be moving to adult community instead of returning home. She will continue to benefit from PT to address remaining hip strength deficits and improve balance and functional mobility and reduce fall risk prior to discharged.     PEDRITO Is progressing well towards her goals.   Patient prognosis is Good.     Patient will continue to benefit from skilled outpatient physical therapy to address the deficits listed in the problem list box on initial evaluation, provide pt/family education and to maximize pt's level of independence in the home and community environment.     Patient's spiritual, cultural and educational needs considered and pt agreeable to plan of care and goals.     Anticipated barriers to physical therapy: age, co-morbidities     Goals:   Short Term Goals (3 Weeks):  1. Pt will be compliant with HEP to supplement PT in restoring pain free function.met  2. Pt will improve impaired LE MMTs to >/= 4/5 to improve dynamic hip/knee support for functional tasks. Progressing, not met  3. Patient will be able to perform active hip flexion to 90 deg to improve transitional movements. Met  4. Patient will be able to ambulate 150 ft to improve community ambulation. met  5. Patient will improve HOOS Jr score by 10% to improve functional mobility. Met     Long Term Goals (6 Weeks):  1. Pt will improve FOTO score to </= 40% limited to decrease perceived limitation with mobility. met  2. Pt will improve impaired LE MMTs to >/= 4+/5 to improve dynamic hip/knee  support for functional tasks.Progressing, not met  3. Pt will improve knee flexion ROM to 0-115 deg to improve mobility for functional tasks. Met flexion, progressing ext- dc goal   4. Patient will be able to ambulate up/down 5 stairs 3x in order to simulate stair navigation in home. Progressing, not met  5. Pt will complete 6mwt with least restrictive assistive device to promote community ambulation.  Progressing, not met    Plan     Plan of care Certification: 5/29/24-6/28/24 2x/week 4 weeks    ANUJ Cruz PT

## 2024-06-03 ENCOUNTER — CLINICAL SUPPORT (OUTPATIENT)
Dept: REHABILITATION | Facility: HOSPITAL | Age: 89
End: 2024-06-03
Payer: MEDICARE

## 2024-06-03 DIAGNOSIS — M25.652 DECREASED RANGE OF LEFT HIP MOVEMENT: ICD-10-CM

## 2024-06-03 DIAGNOSIS — R29.898 DECREASED STRENGTH OF LOWER EXTREMITY: Primary | ICD-10-CM

## 2024-06-03 PROCEDURE — 97530 THERAPEUTIC ACTIVITIES: CPT | Mod: KX,PN

## 2024-06-03 PROCEDURE — 97110 THERAPEUTIC EXERCISES: CPT | Mod: KX,PN

## 2024-06-03 NOTE — PROGRESS NOTES
"[facility]   Physical Therapy Treatment Note      Name: Pedrito Pitt  Clinic Number: [id number]    Therapy Diagnosis:   Encounter Diagnoses   Name Primary?    Decreased strength of lower extremity Yes    Decreased range of left hip movement        Physician: Kit Vasquez MD    Visit Date: 6/3/2024  Physician Orders: PT Eval and Treat   Medical Diagnosis from Referral:   S72.142A (ICD-10-CM) - Closed intertrochanteric fracture of left femur   S72.142D (ICD-10-CM) - Closed displaced intertrochanteric fracture of left femur with routine healing      Evaluation Date: 3/13/2024  Authorization Period Expiration: 12/31/24  Plan of Care Expiration:5/31/24, 6/28/24  Visit # / Visits authorized: 1/1, 21/25  FOTO: 2/3 completed 4/17/24  PTA Visit #: 1/5    Time In: 10:00  Time Out: 10:58  Total Billable Time: 58 minutes 1:1 (1TA, 1TE)     Precautions: Standard, Fall    Subjective     Patient reports: doing well today, no new complaints over the weekend.    She was compliant with home exercise program.  Response to previous treatment: some soreness through the night   Functional change: able to transition easier in bed, able to raise from public toilet seat    Pain: 0/10  Location: left hip      Objective    left trendelenburg, decreased right knee extension in gait    Treatment       PEDRITO received the treatments listed below:      therapeutic exercises to develop strength, endurance, ROM, flexibility, posture, and core stabilization for 10 minutes 1:1 including:  Nu step 6' lvl 2 NP  Standing hip abduction 2x10 1.5# left -cue for hip hike   Standing hip extension 2x10 left 1.5# left only  Cybex knee extension dL x15 15#; sL 2 x 6-8 5# ea  Bridge 3x10 staggered bilateral    Clamshell 2x10 bilateral   Hip abduction isometric 5x30"    Hamstring stretch 3x30"   Straight leg raise 1x5 Bilateral   Hamstring curl 3 plates 2x10   SLS 2x30" bilateral upper extremity support     Not today  Supine hip " "abduction 2x10 NP  Seated ball squeeze 20x5" NP    PEDRITO participated in gait training to improve functional mobility and safety for 05 minutes 1:1, including:  Gait training with single point cane added heel lift (3 laps) standing rest break between laps    therapeutic activities to improve functional performance for 15 minutes 1:1, including:  Step up stairs 1x10  step to next step bilateral, 1 x up/down stairs  Lateral step up and over 4in 2x10    Side stepping // bars 4 laps red theraband knees  Sit to stand: 2x10 standard chair 1x10 staggered   Heel raises 3x10 double leg   Standing marches 2x10 in // bars  red theraband   Step taps 2x10 bilateral   Monique step overs 20x NP  Stair training 3x 5 steps with rail NP    Patient Education and Home Exercises       Education provided:   - continue home exercise program     Written Home Exercises Provided: Patient instructed to cont prior HEP. Exercises were reviewed and PEDRITO was able to demonstrate them prior to the end of the session.  PEDRITO demonstrated good  understanding of the education provided. See Electronic Medical Record under Patient Instructions for exercises provided during therapy sessions    Assessment     Pedrito is a 88 y.o. female referred to outpatient Physical Therapy with a medical diagnosis of Closed displaced intertrochanteric fracture of left femur with routine healing. Pt presents 14 weeks s/p left hip ORIF (2/24/24) ambulating with SPC.  Patient with minimal complaints of hip pain. Observed LLD right longer and trial of heel lift on left with slight improvement. May require slightly higher lift. Continued to work on improving hip strength with added resistance bands today. Continued to work on single leg stability as well. Continue to progress as tolerated.     PEDRITO Is progressing well towards her goals.   Patient prognosis is Good.     Patient will continue to benefit from skilled outpatient physical therapy to address the " deficits listed in the problem list box on initial evaluation, provide pt/family education and to maximize pt's level of independence in the home and community environment.     Patient's spiritual, cultural and educational needs considered and pt agreeable to plan of care and goals.     Anticipated barriers to physical therapy: age, co-morbidities     Goals:   Short Term Goals (3 Weeks):  1. Pt will be compliant with HEP to supplement PT in restoring pain free function.met  2. Pt will improve impaired LE MMTs to >/= 4/5 to improve dynamic hip/knee support for functional tasks. Progressing, not met  3. Patient will be able to perform active hip flexion to 90 deg to improve transitional movements. Met  4. Patient will be able to ambulate 150 ft to improve community ambulation. met  5. Patient will improve HOOS Jr score by 10% to improve functional mobility. Met     Long Term Goals (6 Weeks):  1. Pt will improve FOTO score to </= 40% limited to decrease perceived limitation with mobility. met  2. Pt will improve impaired LE MMTs to >/= 4+/5 to improve dynamic hip/knee support for functional tasks.Progressing, not met  3. Pt will improve knee flexion ROM to 0-115 deg to improve mobility for functional tasks. Met flexion, progressing ext- dc goal   4. Patient will be able to ambulate up/down 5 stairs 3x in order to simulate stair navigation in home. Progressing, not met  5. Pt will complete 6mwt with least restrictive assistive device to promote community ambulation.  Progressing, not met    Plan     Plan of care Certification: 5/29/24-6/28/24 2x/week 4 weeks    ANUJ Cruz, PT

## 2024-06-05 ENCOUNTER — CLINICAL SUPPORT (OUTPATIENT)
Dept: REHABILITATION | Facility: HOSPITAL | Age: 89
End: 2024-06-05
Payer: MEDICARE

## 2024-06-05 DIAGNOSIS — R29.898 DECREASED STRENGTH OF LOWER EXTREMITY: Primary | ICD-10-CM

## 2024-06-05 DIAGNOSIS — M25.652 DECREASED RANGE OF LEFT HIP MOVEMENT: ICD-10-CM

## 2024-06-05 PROCEDURE — 97116 GAIT TRAINING THERAPY: CPT | Mod: PN

## 2024-06-05 PROCEDURE — 97110 THERAPEUTIC EXERCISES: CPT | Mod: PN

## 2024-06-05 PROCEDURE — 97530 THERAPEUTIC ACTIVITIES: CPT | Mod: PN

## 2024-06-05 NOTE — PROGRESS NOTES
"[facility]   Physical Therapy Treatment Note      Name: Pedrito Pitt  Clinic Number: [id number]    Therapy Diagnosis:   Encounter Diagnoses   Name Primary?    Decreased strength of lower extremity Yes    Decreased range of left hip movement        Physician: Kit Vasquez MD    Visit Date: 6/5/2024  Physician Orders: PT Eval and Treat   Medical Diagnosis from Referral:   S72.142A (ICD-10-CM) - Closed intertrochanteric fracture of left femur   S72.142D (ICD-10-CM) - Closed displaced intertrochanteric fracture of left femur with routine healing      Evaluation Date: 3/13/2024  Authorization Period Expiration: 12/31/24  Plan of Care Expiration:6/28/24  Visit # / Visits authorized: 1/1, 23/25  FOTO: 2/3 completed 4/17/24  PTA Visit #: 1/5    Time In: 10:00  Time Out: 10:53  Total Billable Time: 53 minutes 1:1 (1TA, 2TE, Gt 1 )     Precautions: Standard, Fall    Subjective     Patient reports: no increased pain since prior visit.  She was compliant with home exercise program.  Response to previous treatment: good   Functional change: able to transition easier in bed, able to raise from public toilet seat    Pain: 0/10  Location: left hip      Objective    left trendelenburg, decreased right knee extension in gait    Treatment       PEDRITO received the treatments listed below:      therapeutic exercises to develop strength, endurance, ROM, flexibility, posture, and core stabilization for 30 minutes 1:1 including:  Nu step 6' lvl 2 NP  Standing hip abduction 2x10 2#  -cue for hip hike; first set bilateral   Standing hip extension 2x10 left 1.5# left only  Cybex knee extension dL x15 15#; sL 2 x 6-8 5# ea  Bridge 3x10 staggered bilateral    Clamshell 2x10 bilateral   Hip abduction isometric 5x30"    Hamstring stretch 3x30"   Straight leg raise 1x10 Bilateral   Hamstring curl 3.6 plates 2x10   SLS 2x30" bilateral upper extremity support NP    Not today  Supine hip abduction 2x10 NP  Seated " "ball squeeze 20x5" NP    PEDRITO participated in gait training to improve functional mobility and safety for 08 minutes 1:1, including:  Gait training with single point cane (4 laps) standing rest break between laps    therapeutic activities to improve functional performance for 15 minutes 1:1, including:  Step up stairs 1x10  step to next step bilateral, 1 x up/down stairs  Lateral step up and over 4in 2x10    Side stepping // bars 4 laps red theraband knees  Sit to stand: 2x10 standard chair 1x10 staggered   Heel raises 3x10 double leg  NP   Standing marches 2x10 in // bars  red theraband   Step taps 2x10 bilateral  NP  Monique step overs 20x NP  Stair training 3x 5 steps with rail NP    Patient Education and Home Exercises       Education provided:   - continue home exercise program     Written Home Exercises Provided: Patient instructed to cont prior HEP. Exercises were reviewed and PEDRITO was able to demonstrate them prior to the end of the session.  PEDRITO demonstrated good  understanding of the education provided. See Electronic Medical Record under Patient Instructions for exercises provided during therapy sessions    Assessment     Pedrito is a 88 y.o. female referred to outpatient Physical Therapy with a medical diagnosis of Closed displaced intertrochanteric fracture of left femur with routine healing. Pt presents 14 weeks s/p left hip ORIF (2/24/24) ambulating with SPC.  Patient with minimal complaints of hip pain. Ambulating slightly easier with heel lift, ongoing trendelenburg gait. Improving tolerance for active and resisted hip flexion and abduction. Increased walking duration with min fatigue. Continue to progress as tolerated.     PEDRITO Is progressing well towards her goals.   Patient prognosis is Good.     Patient will continue to benefit from skilled outpatient physical therapy to address the deficits listed in the problem list box on initial evaluation, provide pt/family education and to " maximize pt's level of independence in the home and community environment.     Patient's spiritual, cultural and educational needs considered and pt agreeable to plan of care and goals.     Anticipated barriers to physical therapy: age, co-morbidities     Goals:   Short Term Goals (3 Weeks):  1. Pt will be compliant with HEP to supplement PT in restoring pain free function.met  2. Pt will improve impaired LE MMTs to >/= 4/5 to improve dynamic hip/knee support for functional tasks. Progressing, not met  3. Patient will be able to perform active hip flexion to 90 deg to improve transitional movements. Met  4. Patient will be able to ambulate 150 ft to improve community ambulation. met  5. Patient will improve HOOS Jr score by 10% to improve functional mobility. Met     Long Term Goals (6 Weeks):  1. Pt will improve FOTO score to </= 40% limited to decrease perceived limitation with mobility. met  2. Pt will improve impaired LE MMTs to >/= 4+/5 to improve dynamic hip/knee support for functional tasks.Progressing, not met  3. Pt will improve knee flexion ROM to 0-115 deg to improve mobility for functional tasks. Met flexion, progressing ext- dc goal   4. Patient will be able to ambulate up/down 5 stairs 3x in order to simulate stair navigation in home. Progressing, not met  5. Pt will complete 6mwt with least restrictive assistive device to promote community ambulation.  Progressing, not met    Plan     Plan of care Certification: 5/29/24-6/28/24 2x/week 4 weeks    ANUJ Cruz, PT

## 2024-06-12 ENCOUNTER — CLINICAL SUPPORT (OUTPATIENT)
Dept: REHABILITATION | Facility: HOSPITAL | Age: 89
End: 2024-06-12
Payer: MEDICARE

## 2024-06-12 DIAGNOSIS — R29.898 DECREASED STRENGTH OF LOWER EXTREMITY: Primary | ICD-10-CM

## 2024-06-12 DIAGNOSIS — M25.652 DECREASED RANGE OF LEFT HIP MOVEMENT: ICD-10-CM

## 2024-06-12 PROCEDURE — 97116 GAIT TRAINING THERAPY: CPT | Mod: PN

## 2024-06-12 PROCEDURE — 97530 THERAPEUTIC ACTIVITIES: CPT | Mod: PN

## 2024-06-12 NOTE — PROGRESS NOTES
"[facility]   Physical Therapy Treatment Note      Name: Pedrito Pitt  Clinic Number: [id number]    Therapy Diagnosis:   Encounter Diagnoses   Name Primary?    Decreased strength of lower extremity Yes    Decreased range of left hip movement          Physician: Kit Vasquez MD    Visit Date: 6/12/2024  Physician Orders: PT Eval and Treat   Medical Diagnosis from Referral:   S72.142A (ICD-10-CM) - Closed intertrochanteric fracture of left femur   S72.142D (ICD-10-CM) - Closed displaced intertrochanteric fracture of left femur with routine healing      Evaluation Date: 3/13/2024  Authorization Period Expiration: 12/31/24  Plan of Care Expiration:6/28/24  Visit # / Visits authorized: 1/1, 24/25  FOTO: 2/3 completed 4/17/24  PTA Visit #: 1/5    Time In: 10:00  Time Out: 10:55  Total Billable Time: 30 minutes 1:1 (1TA, , Gt 1 )     Precautions: Standard, Fall    Subjective     Patient reports:she will be moving into a adult living community in a few weeks.   She was compliant with home exercise program.  Response to previous treatment: good   Functional change: able to transition easier in bed, able to raise from public toilet seat    Pain: 0/10  Location: left hip      Objective    left trendelenburg, decreased right knee extension in gait    Treatment       PEDRITO received the treatments listed below:      therapeutic exercises to develop strength, endurance, ROM, flexibility, posture, and core stabilization for 0 minutes 1:1 including:  Nu step 6' lvl 2 NP  Standing hip abduction 2x10 2#    Standing hip extension 2x10 left 2# bilateral  Cybex knee extension dL x15 15#; sL 2 x 6-8 5# ea  Bridge 3x10 staggered bilateral    Clamshell 2x10 bilateral   Hip abduction isometric 5x30"    Hamstring stretch 3x30"   Straight leg raise 1x10 Bilateral   Hamstring curl 3.6 plates 2x10   SLS 2x30" bilateral upper extremity support NP    Not today  Supine hip abduction 2x10 NP  Seated ball squeeze " "20x5" NP    PEDRITO participated in gait training to improve functional mobility and safety for 08 minutes 1:1, including:  Gait training with single point cane (4 laps) standing rest break between laps    therapeutic activities to improve functional performance for 22 minutes 1:1, including:  Step up stairs 1x10  step to next step bilateral, 1 x up/down stairs  Lateral step up and over 4in 2x10    Side stepping // bars 4 laps red theraband knees  Sit to stand: 2x10 standard chair 1x10 staggered   Heel raises 3x10 double leg    Standing marches 2x10 in // bars  2#  Step taps 2x10 bilateral  NP  Monique step overs 20x NP  Stair training 3x 5 steps with rail NP    Patient Education and Home Exercises       Education provided:   - continue home exercise program     Written Home Exercises Provided: Patient instructed to cont prior HEP. Exercises were reviewed and PEDRITO was able to demonstrate them prior to the end of the session.  PEDRITO demonstrated good  understanding of the education provided. See Electronic Medical Record under Patient Instructions for exercises provided during therapy sessions    Assessment     Pedrito is a 88 y.o. female referred to outpatient Physical Therapy with a medical diagnosis of Closed displaced intertrochanteric fracture of left femur with routine healing. Pt presents 15 weeks s/p left hip ORIF (2/24/24) ambulating with SPC.  Patient with minimal complaints of hip pain. Decreased groin pain. Continues to have some difficulty with single leg stance but better tolerance for standing activities. Improving endurance during ambulation. Continue to progress as tolerated.     PEDRITO Is progressing well towards her goals.   Patient prognosis is Good.     Patient will continue to benefit from skilled outpatient physical therapy to address the deficits listed in the problem list box on initial evaluation, provide pt/family education and to maximize pt's level of independence in the home " and community environment.     Patient's spiritual, cultural and educational needs considered and pt agreeable to plan of care and goals.     Anticipated barriers to physical therapy: age, co-morbidities     Goals:   Short Term Goals (3 Weeks):  1. Pt will be compliant with HEP to supplement PT in restoring pain free function.met  2. Pt will improve impaired LE MMTs to >/= 4/5 to improve dynamic hip/knee support for functional tasks. Progressing, not met  3. Patient will be able to perform active hip flexion to 90 deg to improve transitional movements. Met  4. Patient will be able to ambulate 150 ft to improve community ambulation. met  5. Patient will improve HOOS Jr score by 10% to improve functional mobility. Met     Long Term Goals (6 Weeks):  1. Pt will improve FOTO score to </= 40% limited to decrease perceived limitation with mobility. met  2. Pt will improve impaired LE MMTs to >/= 4+/5 to improve dynamic hip/knee support for functional tasks.Progressing, not met  3. Pt will improve knee flexion ROM to 0-115 deg to improve mobility for functional tasks. Met flexion, progressing ext- dc goal   4. Patient will be able to ambulate up/down 5 stairs 3x in order to simulate stair navigation in home. Progressing, not met  5. Pt will complete 6mwt with least restrictive assistive device to promote community ambulation.  Progressing, not met    Plan     Plan of care Certification: 5/29/24-6/28/24 2x/week 4 weeks  Progress note next    ANUJ Cruz, PT

## 2024-06-17 ENCOUNTER — CLINICAL SUPPORT (OUTPATIENT)
Dept: REHABILITATION | Facility: HOSPITAL | Age: 89
End: 2024-06-17
Payer: MEDICARE

## 2024-06-17 DIAGNOSIS — M25.652 DECREASED RANGE OF LEFT HIP MOVEMENT: ICD-10-CM

## 2024-06-17 DIAGNOSIS — R29.898 DECREASED STRENGTH OF LOWER EXTREMITY: Primary | ICD-10-CM

## 2024-06-17 PROCEDURE — 97530 THERAPEUTIC ACTIVITIES: CPT | Mod: PN,CQ

## 2024-06-17 PROCEDURE — 97116 GAIT TRAINING THERAPY: CPT | Mod: PN,CQ

## 2024-06-17 PROCEDURE — 97110 THERAPEUTIC EXERCISES: CPT | Mod: PN,CQ

## 2024-06-17 NOTE — PROGRESS NOTES
"[facility]   Physical Therapy Treatment Note      Name: Pedrito Pitt  Clinic Number: [id number]    Therapy Diagnosis:   Encounter Diagnoses   Name Primary?    Decreased strength of lower extremity Yes    Decreased range of left hip movement            Physician: Kit Vasquez MD    Visit Date: 6/17/2024  Physician Orders: PT Eval and Treat   Medical Diagnosis from Referral:   S72.142A (ICD-10-CM) - Closed intertrochanteric fracture of left femur   S72.142D (ICD-10-CM) - Closed displaced intertrochanteric fracture of left femur with routine healing      Evaluation Date: 3/13/2024  Authorization Period Expiration: 12/31/24  Plan of Care Expiration:6/28/24  Visit # / Visits authorized: 1/1, 25/25  FOTO: 2/3 completed 4/17/24  PTA Visit #: 1/5    Time In: 9:00  Time Out: 10:00  Total Billable Time: 60 minutes 1:1 (1 TE, 2TA, 1 Gt)     Precautions: Standard, Fall    Subjective     Patient reports: Looking 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 Expecting to move in a about a week.  She was compliant with home exercise program.  Response to previous treatment: good  Functional change: able to transition easier in bed, able to raise from public toilet seat    Pain: 0/10  Location: left hip      Objective    left trendelenburg, decreased right knee extension in gait    Treatment      PEDRITO received the treatments listed below:      therapeutic exercises to develop strength, endurance, ROM, flexibility, posture, and core stabilization for 25 minutes 1:1 including:  Bike 5 mins level 1  Standing hip abduction 2x10 2#    Standing hip extension 2x10 2# bilateral  Cybex knee extension dL 2x10 15#; sL 2 x 8 5# ea  Hamstring curl 4 plates 2x10   SLS 2x30" bilateral upper extremity support     Not today:  Supine hip abduction 2x10   Seated ball squeeze 20x5"  Bridge 3x10 staggered bilateral    Clamshell 2x10 bilateral   Hip abduction isometric 5x30"    Hamstring stretch 3x30"   Straight leg " raise 1x10 Bilateral    PEDRITO participated in gait training to improve functional mobility and safety for 08 minutes 1:1, including:  Gait training with single point cane (4 laps)    therapeutic activities to improve functional performance for 27 minutes 1:1, including:  Side stepping // bars 4 laps red theraband knees  Sit to stand: 1x10 standard chair 1x10 staggered - staggered not today  Heel raises 3x10 double leg   Monique (red) step overs fwd/lateral 3 laps 5 hurdles each    Not today:  Step up stairs 1x10  step to next step bilateral, 1 x up/down stairs  Standing marches 2x10 in // bars  2# NP  Step taps 2x10 bilateral  NP  Stair training 3x 5 steps with rail NP    Patient Education and Home Exercises       Education provided:   - continue home exercise program     Written Home Exercises Provided: Patient instructed to cont prior HEP. Exercises were reviewed and PEDRITO was able to demonstrate them prior to the end of the session.  PEDRITO demonstrated good  understanding of the education provided. See Electronic Medical Record under Patient Instructions for exercises provided during therapy sessions    Assessment     Pedrito is a 88 y.o. female referred to outpatient Physical Therapy with a medical diagnosis of Closed displaced intertrochanteric fracture of left femur with routine healing. Pt presents 16 weeks + 2 days s/p left hip ORIF (2/24/24) ambulating with SPC.  Pt reports no hip pain today. Mild fatigue post hurdles and side stepping. Good recovery with seated rest breaks. Patient tolerated therapeutic exercises progressions well. Little to no fatigue and no complaints of hip pain. Patient's ambulation endurance improving. Required no rest break between laps this session. Continue to monitor pain and progress as tolerated.  PEDRITO Is progressing well towards her goals.   Patient prognosis is Good.     Patient will continue to benefit from skilled outpatient physical therapy to address the  deficits listed in the problem list box on initial evaluation, provide pt/family education and to maximize pt's level of independence in the home and community environment.     Patient's spiritual, cultural and educational needs considered and pt agreeable to plan of care and goals.     Anticipated barriers to physical therapy: age, co-morbidities     Goals:   Short Term Goals (3 Weeks):  1. Pt will be compliant with HEP to supplement PT in restoring pain free function.met  2. Pt will improve impaired LE MMTs to >/= 4/5 to improve dynamic hip/knee support for functional tasks. Progressing, not met  3. Patient will be able to perform active hip flexion to 90 deg to improve transitional movements. Met  4. Patient will be able to ambulate 150 ft to improve community ambulation. met  5. Patient will improve HOOS Jr score by 10% to improve functional mobility. Met     Long Term Goals (6 Weeks):  1. Pt will improve FOTO score to </= 40% limited to decrease perceived limitation with mobility. met  2. Pt will improve impaired LE MMTs to >/= 4+/5 to improve dynamic hip/knee support for functional tasks.Progressing, not met  3. Pt will improve knee flexion ROM to 0-115 deg to improve mobility for functional tasks. Met flexion, progressing ext- dc goal   4. Patient will be able to ambulate up/down 5 stairs 3x in order to simulate stair navigation in home. Progressing, not met  5. Pt will complete 6mwt with least restrictive assistive device to promote community ambulation.  Progressing, not met    Plan     Plan of care Certification: 5/29/24-6/28/24 2x/week 4 weeks  Progress note next    DEVYN De La Fuente

## 2024-06-19 ENCOUNTER — CLINICAL SUPPORT (OUTPATIENT)
Dept: REHABILITATION | Facility: HOSPITAL | Age: 89
End: 2024-06-19
Payer: MEDICARE

## 2024-06-19 DIAGNOSIS — M25.652 DECREASED RANGE OF LEFT HIP MOVEMENT: ICD-10-CM

## 2024-06-19 DIAGNOSIS — R29.898 DECREASED STRENGTH OF LOWER EXTREMITY: Primary | ICD-10-CM

## 2024-06-19 PROCEDURE — 97110 THERAPEUTIC EXERCISES: CPT | Mod: KX,PN,CQ

## 2024-06-19 PROCEDURE — 97530 THERAPEUTIC ACTIVITIES: CPT | Mod: KX,PN,CQ

## 2024-06-19 NOTE — PROGRESS NOTES
"[facility]   Physical Therapy Treatment Note      Name: Pedrito Pitt  Clinic Number: [id number]    Therapy Diagnosis:   Encounter Diagnoses   Name Primary?    Decreased strength of lower extremity Yes    Decreased range of left hip movement        Physician: Kit Vasquez MD    Visit Date: 6/19/2024  Physician Orders: PT Eval and Treat   Medical Diagnosis from Referral:   S72.142A (ICD-10-CM) - Closed intertrochanteric fracture of left femur   S72.142D (ICD-10-CM) - Closed displaced intertrochanteric fracture of left femur with routine healing      Evaluation Date: 3/13/2024  Authorization Period Expiration: 12/31/24  Plan of Care Expiration:6/28/24  Visit # / Visits authorized: 1/1, 26/25  FOTO: 2/3 completed 4/17/24  PTA Visit #: 2/5    Time In: 9:00  Time Out: 10:00  Total Billable Time: 30 minutes 1:1 (1 TE, 1TA)     Precautions: Standard, Fall    Subjective     Patient reports: Expresses she would like to continue physical therapy and transfer care to the [facility] location at her [facility] community she's moving into Sunday/Monday.   She was compliant with home exercise program.  Response to previous treatment: tired  Functional change: able to transition easier in bed, able to raise from public toilet seat    Pain: 0/10  Location: left hip      Objective    left trendelenburg, decreased right knee extension in gait    Treatment      PEDRITO received the treatments listed below:      therapeutic exercises to develop strength, endurance, ROM, flexibility, posture, and core stabilization for 10 minutes 1:1 including:  Supine hip abduction RTB 2x10   Standing hip abduction 2x10 2# bilateral  Standing hip extension 2x10 2# bilateral  SLS 3x30" unilateral upper extremity support     Not today:  Bike 5 mins level 1  Cybex knee extension dL 2x10 15#; sL 2 x 8 5# ea  Hamstring curl 4 plates 2x10   Seated ball squeeze 20x5"  Bridge 3x10 staggered bilateral    Clamshell 2x10 bilateral " "  Hip abduction isometric 5x30"    Hamstring stretch 3x30"   Straight leg raise 1x10 Bilateral    PEDRITO participated in gait training to improve functional mobility and safety for 10 minutes 1:1, including:  Gait training with single point cane (4 laps)    therapeutic activities to improve functional performance for 10 minutes 1:1, including:  Sit to stand: 1x10 standard chair 1x10 staggered - staggered not today  Heel raises 3x10 double leg   Monique (red) step overs fwd alternating/lateral 4 laps 6 hurdles each  Stairs navigation with bilateral UE use 5x up/down    Not today:  Side stepping // bars 4 laps red theraband knees  Standing marches 2x10 in // bars  2# NP  Step taps 2x10 bilateral  NP    Patient Education and Home Exercises       Education provided:   - continue home exercise program     Written Home Exercises Provided: Patient instructed to cont prior HEP. Exercises were reviewed and PEDRITO was able to demonstrate them prior to the end of the session.  PEDRITO demonstrated good  understanding of the education provided. See Electronic Medical Record under Patient Instructions for exercises provided during therapy sessions    Assessment     Pedrito is a 88 y.o. female referred to outpatient Physical Therapy with a medical diagnosis of Closed displaced intertrochanteric fracture of left femur with routine healing. Pt presents 16 weeks + 4 days s/p left hip ORIF (2/24/24) ambulating with SPC.    Pt reports no hip pain today. Trial of stair navigation for improved function at home. Displayed good ascension/descending with minimal cues with BUE support. Improved balance and endurance with single leg activities. Able to accept weight in left hip with little to no rest breaks, no loss of balance and decreased upper extremity support. Continue to progress as tolerated.    PEDRITO Is progressing well towards her goals.   Patient prognosis is Good.     Patient will continue to benefit from skilled " outpatient physical therapy to address the deficits listed in the problem list box on initial evaluation, provide pt/family education and to maximize pt's level of independence in the home and community environment.     Patient's spiritual, cultural and educational needs considered and pt agreeable to plan of care and goals.     Anticipated barriers to physical therapy: age, co-morbidities     Goals:   Short Term Goals (3 Weeks):  1. Pt will be compliant with HEP to supplement PT in restoring pain free function.met  2. Pt will improve impaired LE MMTs to >/= 4/5 to improve dynamic hip/knee support for functional tasks. Progressing, not met  3. Patient will be able to perform active hip flexion to 90 deg to improve transitional movements. Met  4. Patient will be able to ambulate 150 ft to improve community ambulation. met  5. Patient will improve HOOS Jr score by 10% to improve functional mobility. Met     Long Term Goals (6 Weeks):  1. Pt will improve FOTO score to </= 40% limited to decrease perceived limitation with mobility. met  2. Pt will improve impaired LE MMTs to >/= 4+/5 to improve dynamic hip/knee support for functional tasks.Progressing, not met  3. Pt will improve knee flexion ROM to 0-115 deg to improve mobility for functional tasks. Met flexion, progressing ext- dc goal   4. Patient will be able to ambulate up/down 5 stairs 3x in order to simulate stair navigation in home. Progressing, not met  5. Pt will complete 6mwt with least restrictive assistive device to promote community ambulation.  Progressing, not met    Plan     Plan of care Certification: 5/29/24-6/28/24 2x/week 4 weeks      DEVYN De La Fuente

## 2024-07-02 ENCOUNTER — CLINICAL SUPPORT (OUTPATIENT)
Dept: REHABILITATION | Facility: HOSPITAL | Age: 89
End: 2024-07-02
Payer: MEDICARE

## 2024-07-02 DIAGNOSIS — M25.652 DECREASED RANGE OF LEFT HIP MOVEMENT: ICD-10-CM

## 2024-07-02 DIAGNOSIS — R29.898 DECREASED STRENGTH OF LOWER EXTREMITY: Primary | ICD-10-CM

## 2024-07-02 PROCEDURE — 97530 THERAPEUTIC ACTIVITIES: CPT | Mod: PN

## 2024-07-02 PROCEDURE — 97110 THERAPEUTIC EXERCISES: CPT | Mod: PN

## 2024-07-02 NOTE — PROGRESS NOTES
"[facility]   Physical Therapy Treatment Note /discharge summary     Name: Sola Pitt  Clinic Number: [id number]    Therapy Diagnosis:   Encounter Diagnoses   Name Primary?    Decreased strength of lower extremity Yes    Decreased range of left hip movement        Physician: Kit Vasquez MD    Visit Date: 7/2/2024  Physician Orders: PT Eval and Treat   Medical Diagnosis from Referral:   S72.142A (ICD-10-CM) - Closed intertrochanteric fracture of left femur   S72.142D (ICD-10-CM) - Closed displaced intertrochanteric fracture of left femur with routine healing      Evaluation Date: 3/13/2024  Authorization Period Expiration: 12/31/24  Plan of Care Expiration:6/28/24  Visit # / Visits authorized: 1/1, 27/35  FOTO: 3/3 complete  PTA Visit #: 2/5    Time In: 2:00  Time Out: 2:38  Total Billable Time: 30 minutes 1:1 (1 TE, 1TA)     Precautions: Standard, Fall    Subjective     Patient reports: she would like to continue physical therapy and transfer care to the [facility] location at her [facility] community she's moving into at "[facility]  Patient reports she is able to perform daily activities easier overall.   She was compliant with home exercise program.  Response to previous treatment: tired  Functional change: able to transition easier in bed, able to raise from public toilet seat, walk    Pain: 0/10  Location: left hip      Objective    left trendelenburg, decreased knee extension in gait     6MWT: 680 ft with single point cane 1 standing rest break     Posture: slight trunk flexion, decreased knee extension   Sensation: normal     Range of Motion/Strength:      Hip Right Left Pain/Dysfunction with Movement   AROM/PROM         flexion  125  116      extension  Not tested   Not tested      abduction  50  45     Internal rotation  45  35     External rotation  45  45        Knee Right Left Pain/Dysfunction with Movement   AROM/PROM         flexion  128  130 Left knee crepitus  " "  extension  6  3           L/E MMT Right Left Pain/Dysfunction with Movement   Hip Flexion 5/5 4+/5     Hip Extension 4/5 4/5  50% raise single leg bridge, good double leg bridge   Hip Abduction 4+/5 4-/5     Hip Adduction 5/5 5/5     Hip ER 4+/5 4+/5     Knee Flexion 5/5 5/5     Knee Extension 5/5 5/5    Ankle DF 5/5 5/5     Ankle PF 5/5 5/5        Joint Mobility:   Hip: not tested   Knee: decreased patellar mobility all directions left      Flexibility: 60 deg straight leg raise left, within normal limits right      Gait Analysis:With AD.  Device Used -  Rolling walker Assistance ind Deviations: limited hip extension and step length, decreased hip flexion and knee flexion     Stairs: able to alternate with use of 1 hand rail right no pain, significant challenge with step up with ipsilateral hand rail. Able to reciprocally descend with 1 hand rail      TU.65 with single point cane   TUG Cutoff Scores:13.5          30 second sit to stand: 13x with upper extremity support  (50% upper extremity support)     CMS Impairment/Limitation/Restriction for FOTO hip Survey     Therapist reviewed FOTO scores for Pedrito Pitt on 2024.   FOTO documents entered into [employer] - see Media section.     Limitation Score: 31%  Category: Mobility     Treatment      PEDRITO received the treatments listed below:      therapeutic exercises to develop strength, endurance, ROM, flexibility, posture, and core stabilization for 15 minutes 1:1 including:  Objective measures    Not today:  Supine hip abduction RTB 2x10   Standing hip abduction 2x10 2# bilateral  Standing hip extension 2x10 2# bilateral  SLS 3x30" unilateral upper extremity support   Bike 5 mins level 1  Cybex knee extension dL 2x10 15#; sL 2 x 8 5# ea  Hamstring curl 4 plates 2x10   Seated ball squeeze 20x5"  Bridge 3x10 staggered bilateral    Clamshell 2x10 bilateral   Hip abduction isometric 5x30"    Hamstring stretch 3x30"   Straight leg raise 1x10 " Bilateral    PEDRITO participated in gait training to improve functional mobility and safety for 0 minutes 1:1, including:  Gait training with single point cane (4 laps)    therapeutic activities to improve functional performance for 15 minutes 1:1, including:  Sit to stand: 1x10 standard chair 1x10 staggered - staggered not today  Heel raises 3x10 double leg   Monique (red) step overs fwd alternating/lateral 4 laps 6 hurdles each  Stairs navigation with bilateral UE use 5x up/down    Not today:  Side stepping // bars 4 laps red theraband knees  Standing marches 2x10 in // bars  2# NP  Step taps 2x10 bilateral  NP    Patient Education and Home Exercises       Education provided:   - continue home exercise program     Written Home Exercises Provided: Patient instructed to cont prior HEP. Exercises were reviewed and PEDRITO was able to demonstrate them prior to the end of the session.  PEDRITO demonstrated good  understanding of the education provided. See Electronic Medical Record under Patient Instructions for exercises provided during therapy sessions    Assessment     Pedrito is a 88 y.o. female referred to outpatient Physical Therapy with a medical diagnosis of Closed displaced intertrochanteric fracture of left femur with routine healing. Pt presents 17 weeks s/p left hip ORIF (2/24/24) ambulating with SPC.  Patient has demonstrated good improvements since beginning PT. Patient demonstrates minimal hip range of motion restrictions. She has progressed well with lower extremity strength with remaining deficit of hip abductor and extensor weakness. This remaining deficit continues to impact ambulation and stair navigation. Unable to maintain level pelvis during single leg stance. She has made significant improvements in transitional movements. Patient has progressed well towards goals as noted below. She recently moved into assisted living community which has PT facilities within and would like to transfer  "services there.      PEDRITO Is progressing well towards her goals.   Patient prognosis is Good.     Patient will continue to benefit from skilled outpatient physical therapy to address the deficits listed in the problem list box on initial evaluation, provide pt/family education and to maximize pt's level of independence in the home and community environment.     Patient's spiritual, cultural and educational needs considered and pt agreeable to plan of care and goals.     Anticipated barriers to physical therapy: age, co-morbidities     Goals:   Short Term Goals (3 Weeks):  1. Pt will be compliant with HEP to supplement PT in restoring pain free function.met  2. Pt will improve impaired LE MMTs to >/= 4/5 to improve dynamic hip/knee support for functional tasks. Met (hip abduction 4-/5)  3. Patient will be able to perform active hip flexion to 90 deg to improve transitional movements. Met  4. Patient will be able to ambulate 150 ft to improve community ambulation. met  5. Patient will improve HOOS Jr score by 10% to improve functional mobility. Met     Long Term Goals (6 Weeks):  1. Pt will improve FOTO score to </= 40% limited to decrease perceived limitation with mobility. met  2. Pt will improve impaired LE MMTs to >/= 4+/5 to improve dynamic hip/knee support for functional tasks.Progressing, not met  3. Pt will improve knee flexion ROM to 0-115 deg to improve mobility for functional tasks. Met flexion, progressing ext- dc goal   4. Patient will be able to ambulate up/down 5 stairs 3x in order to simulate stair navigation in home. Progressing, not met  5. Pt will complete 6mwt with least restrictive assistive device to promote community ambulation.  met    Plan     Transferring care to Rehab in assisted living facility "[facility]  Patient to request new referral from MD ANUJ Cruz, PT       "

## 2024-07-03 NOTE — PLAN OF CARE
"[facility]   Physical Therapy discharge summary     Name: Sola Pitt  Clinic Number: [id number]    Therapy Diagnosis:   Encounter Diagnoses   Name Primary?    Decreased strength of lower extremity Yes    Decreased range of left hip movement        Physician: Kit Vasquez MD    Visit Date: 7/2/2024  Physician Orders: PT Eval and Treat   Medical Diagnosis from Referral:   S72.142A (ICD-10-CM) - Closed intertrochanteric fracture of left femur   S72.142D (ICD-10-CM) - Closed displaced intertrochanteric fracture of left femur with routine healing      Evaluation Date: 3/13/2024  Authorization Period Expiration: 12/31/24  Plan of Care Expiration:6/28/24  Visit # / Visits authorized: 1/1, 27/35  FOTO: 3/3 complete  PTA Visit #: 2/5    Time In: 2:00  Time Out: 2:38  Total Billable Time: 30 minutes 1:1 (1 TE, 1TA)     Precautions: Standard, Fall    Subjective     Patient reports: she would like to continue physical therapy and transfer care to the [facility] location at her new [facility] community she's moving into at "[facility]  Patient reports she is able to perform daily activities easier overall.   She was compliant with home exercise program.  Response to previous treatment: tired  Functional change: able to transition easier in bed, able to raise from public toilet seat, walk    Pain: 0/10  Location: left hip      Objective    left trendelenburg, decreased knee extension in gait     6MWT: 680 ft with single point cane 1 standing rest break     Posture: slight trunk flexion, decreased knee extension   Sensation: normal     Range of Motion/Strength:      Hip Right Left Pain/Dysfunction with Movement   AROM/PROM         flexion  125  116      extension  Not tested   Not tested      abduction  50  45     Internal rotation  45  35     External rotation  45  45        Knee Right Left Pain/Dysfunction with Movement   AROM/PROM         flexion  128  130 Left knee crepitus    extension  6  3      "      L/E MMT Right Left Pain/Dysfunction with Movement   Hip Flexion 5/5 4+/5     Hip Extension 4/5 4/5  50% raise single leg bridge, good double leg bridge   Hip Abduction 4+/5 4-/5     Hip Adduction 5/5 5/5     Hip ER 4+/5 4+/5     Knee Flexion 5/5 5/5     Knee Extension 5/5 5/5    Ankle DF 5/5 5/5     Ankle PF 5/5 5/5        Joint Mobility:   Hip: not tested   Knee: decreased patellar mobility all directions left      Flexibility: 60 deg straight leg raise left, within normal limits right      Gait Analysis:With AD.  Device Used -  Rolling walker Assistance ind Deviations: limited hip extension and step length, decreased hip flexion and knee flexion     Stairs: able to alternate with use of 1 hand rail right no pain, significant challenge with step up with ipsilateral hand rail. Able to reciprocally descend with 1 hand rail      TU.65 with single point cane   TUG Cutoff Scores:13.5          30 second sit to stand: 13x with upper extremity support  (50% upper extremity support)     CMS Impairment/Limitation/Restriction for FOTO hip Survey     Therapist reviewed FOTO scores for Pedrito Pitt on 2024.   FOTO documents entered into [employer] - see Media section.     Limitation Score: 31%  Category: Mobility     Treatment      PEDRITO received the treatments listed in daily note    Patient Education and Home Exercises       Education provided:   - continue home exercise program     Written Home Exercises Provided: Patient instructed to cont prior HEP. Exercises were reviewed and PEDRITO was able to demonstrate them prior to the end of the session.  PEDRITO demonstrated good  understanding of the education provided. See Electronic Medical Record under Patient Instructions for exercises provided during therapy sessions    Assessment     Pedrito is a 88 y.o. female referred to outpatient Physical Therapy with a medical diagnosis of Closed displaced intertrochanteric fracture of left femur with routine  healing. Pt presents 17 weeks s/p left hip ORIF (2/24/24) ambulating with SPC.  Patient has demonstrated good improvements since beginning PT. Patient demonstrates minimal hip range of motion restrictions. She has progressed well with lower extremity strength with remaining deficit of hip abductor and extensor weakness. This remaining deficit continues to impact ambulation and stair navigation. Unable to maintain level pelvis during single leg stance. She has made significant improvements in transitional movements. Patient has progressed well towards goals as noted below. She recently moved into assisted living community which has PT facilities within and would like to transfer services there.      PEDRITO Is progressing well towards her goals.   Patient prognosis is Good.     Patient will continue to benefit from skilled outpatient physical therapy to address the deficits listed in the problem list box on initial evaluation, provide pt/family education and to maximize pt's level of independence in the home and community environment.     Patient's spiritual, cultural and educational needs considered and pt agreeable to plan of care and goals.     Anticipated barriers to physical therapy: age, co-morbidities     Goals:   Short Term Goals (3 Weeks):  1. Pt will be compliant with HEP to supplement PT in restoring pain free function.met  2. Pt will improve impaired LE MMTs to >/= 4/5 to improve dynamic hip/knee support for functional tasks. Met (hip abduction 4-/5)  3. Patient will be able to perform active hip flexion to 90 deg to improve transitional movements. Met  4. Patient will be able to ambulate 150 ft to improve community ambulation. met  5. Patient will improve HOOS Jr score by 10% to improve functional mobility. Met     Long Term Goals (6 Weeks):  1. Pt will improve FOTO score to </= 40% limited to decrease perceived limitation with mobility. met  2. Pt will improve impaired LE MMTs to >/= 4+/5 to improve  "dynamic hip/knee support for functional tasks.Progressing, not met  3. Pt will improve knee flexion ROM to 0-115 deg to improve mobility for functional tasks. Met flexion, progressing ext- dc goal   4. Patient will be able to ambulate up/down 5 stairs 3x in order to simulate stair navigation in home. Progressing, not met  5. Pt will complete 6mwt with least restrictive assistive device to promote community ambulation.  met    Plan     Transferring care to Rehab in assisted living facility "[facility]  Patient to request new referral from MD ANUJ Cruz, PT       "

## 2024-07-10 ENCOUNTER — CARE AT HOME (OUTPATIENT)
Dept: HOME HEALTH SERVICES | Facility: CLINIC | Age: 89
End: 2024-07-10
Payer: MEDICARE

## 2024-07-10 DIAGNOSIS — I73.9 PVD (PERIPHERAL VASCULAR DISEASE): ICD-10-CM

## 2024-07-10 DIAGNOSIS — R26.9 GAIT ABNORMALITY: ICD-10-CM

## 2024-07-10 DIAGNOSIS — I48.0 PAROXYSMAL ATRIAL FIBRILLATION: ICD-10-CM

## 2024-07-10 DIAGNOSIS — E03.9 HYPOTHYROIDISM, UNSPECIFIED TYPE: ICD-10-CM

## 2024-07-10 DIAGNOSIS — R29.898 DECREASED STRENGTH OF LOWER EXTREMITY: Primary | ICD-10-CM

## 2024-07-10 PROCEDURE — 99497 ADVNCD CARE PLAN 30 MIN: CPT | Mod: S$GLB,,, | Performed by: NURSE PRACTITIONER

## 2024-07-10 PROCEDURE — 99348 HOME/RES VST EST LOW MDM 30: CPT | Mod: S$GLB,,, | Performed by: NURSE PRACTITIONER

## 2024-07-15 NOTE — PROGRESS NOTES
[facility]  Medical Chronic Care Home Visit    Encounter Provider: Mary Jane Monroe   PCP: Joshua Brennan MD  Consult Requested By: No ref. provider found    HISTORY OF PRESENT ILLNESS      Patient ID: Sola Pitt is a 88 y.o. female is being seen in the home due to physical debility that presents a taxing effort to leave the home, to mitigate high risk of hospital readmission and/or due to the limited availability of reliable or safe options for transportation to the point of access to the provider. Prior to treatment on this visit the chart was reviewed and patient verbal consent was obtained.    Chronic medical conditions synopsis:  Sola Carmona is an 88yoF with Afib, Hypothyroidism, PVD, and Gait abnormality. Dr. Brennan is her PCP. She is a new resident at [facility], [place] where she is being evaluated today. SHe will have labs Wednesday 7/24/24 for PT/INR and Synthroid. She will also have PT to evaluate and treat for gait training/fall precaution.    DECISION MAKING TODAY    [facility] ordered as  to help with patient needs. Education completed ~ 15 minutes. Plan to follow up.     VSS. Denies fever, chest pain, shortness of breath, nausea, vomiting, diarrhea. Risks of environmental exposure to coronavirus discussed including: social distancing, hand hygiene, and limiting departures from the home for necessities only.  Reports understanding and willingness to comply.  All hospital discharge orders reviewed and being followed, all medications reconciled and reviewed, patient and family verbalized understanding. No other needs identified at this time.     I initiated the process of advance care planning today and explained the importance of this process to the patient and family.  I introduced the concept of advance directives to the patient, as well. Then the patient received detailed information about the importance of designating a Health Care Power of   (HCPOA). She was also instructed to communicate with this person about his wishes for future healthcare, should he become sick and lose decision-making capacity. FULL CODE STATUS    I Introduced [place] form with patient/family, explaining this is the patient's wishes, and this form will be uploaded into the patient's [facility] and the [place] Registry.     We spoke about ACP for 30 minutes.    Attestation: Screening criteria to assess the level of the patient's risk for infection with COVID-19 as recommended by the CDC at the time of the above documented home visit concluded appropriateness to proceed. Universal precautions were maintained at all times, including provider use of 60% alcohol gel hand  immediately prior to entry and upon departing the patient's home.        Assessment & Plan:  1. Decreased strength of lower extremity  -     Ambulatory referral/consult to Home Health; Future; Expected date: 07/16/2024    2. Gait abnormality  Overview:  Home PT to evaluate and treat.    Assessment & Plan:  Home PT to evaluate and treat.      3. Paroxysmal atrial fibrillation  Assessment & Plan:  Continue Coumadin 5 mg po daily  PT/INR      4. PVD (peripheral vascular disease)  Assessment & Plan:  The current medical regimen is effective;  continue present plan and medications.         5. Hypothyroidism, unspecified type  Assessment & Plan:  Continue Synthroid daily             ENVIRONMENT OF CARE      Family and/or Caregiver present at visit?  Yes  Name of Caregiver: Facility staff  History provided by: patient    4Ms for Medical Decision-Making in Older Adults    Last Completed EAWV: None    MOBILITY:  Get Up and Go:       No data to display              Activities of Daily Living:       No data to display              Whisper Test:       No data to display              Disability Status:       No data to display              Nutrition Screening:       No data to display             Screening Score: 0-7  Malnourished, 8-11 At Risk, 12-14 Normal    MENTATION:   Depression Patient Health Questionnaire:       No data to display              Has Dementia Dx: No    Cognitive Function Screening:       No data to display              Cognitive Function Screening Total - Less than 4 = Abnormal,  Greater than or equal to 4 = Normal    MEDICATIONS:  High Risk Medications:  Total Active Medications: 1  gabapentin - 300 MG    WHAT MATTERS MOST:  Advance Care Planning   ACP Status:   Patient does not have an ACP conversation on file  Living Will: No  Power of : No  [place]: No    What is most important right now is to focus on avoiding the hospital    Accordingly, we have decided that the best plan to meet the patient's goals includes continuing with treatment      What matters most to patient today is: Safety      Is patient hospice appropriate: No  (If needed, use PPS <30 or FAST score >7)  Was referral to hospice placed: No    Impression upon entering the home:  Physical Dwelling: assisted living facility (name of facility: [facility])   Appearance of home environment: cleaniness: clean  Functional Status: minimal assistance  Mobility: ambulatory with device  Nutritional access: adequate intake and access  Home Health: No, and will be referred today   DME/Supplies: rolling walker     Diagnostic tests reviewed/disposition: No diagnosic tests pending after this hospitalization.  Disease/illness education:  Afib  Establishment or re-establishment of referral orders for community resources: No other necessary community resources.   Discussion with other health care providers: No discussion with other health care providers necessary.   Does patient have a PCP at [facility]? Yes   Repatriation plan with PCP? follow-up with PCP within 30d   Does patient have an ostomy (ileostomy, colostomy, suprapubic catheter, nephrostomy tube, tracheostomy, PEG tube, pleurex catheter, cholecystostomy, etc)? No  Were BPAs reviewed? Yes    Social  History     Socioeconomic History    Marital status:    Tobacco Use    Smoking status: Never     Passive exposure: Never    Smokeless tobacco: Never   Substance and Sexual Activity    Alcohol use: Yes    Drug use: Never    Sexual activity: Not Currently         OBJECTIVE:     Vital Signs:  Vitals:    07/10/24 1421   BP: 120/67   Pulse: 70   Resp: 18   Temp: 97.6 °F (36.4 °C)       Review of Systems    Physical Exam:  Physical Exam    INSTRUCTIONS FOR PATIENT:     Scheduled Follow-up, Appts Reviewed with Modifications if Needed: Yes  No future appointments.      Current Outpatient Medications:     cloNIDine (CATAPRES) 0.1 MG tablet, Take 0.1 mg by mouth 2 (two) times daily., Disp: , Rfl:     gabapentin (NEURONTIN) 300 MG capsule, 300 mg., Disp: , Rfl:     levothyroxine (SYNTHROID) 75 MCG tablet, Take 75 mcg by mouth., Disp: , Rfl:     metoprolol succinate (TOPROL-XL) 100 MG 24 hr tablet, Take 100 mg by mouth 2 (two) times daily., Disp: , Rfl:     valsartan-hydrochlorothiazide (DIOVAN-HCT) 320-25 mg per tablet, 1 tab, Oral, Daily, # 90 tab, 3 Refill(s), Pharmacy: [id number], 167, cm, 11/06/23 10:35:00 CST, Height/Length Measured, 71.3, kg, 11/06/23 10:35:00 CST, Weight Dosing, Disp: , Rfl:     warfarin (COUMADIN) 5 MG tablet, Take 5 mg by mouth., Disp: , Rfl:     Medication Reconciliation:  Were medications changed during this appointment? No  Were medications in the home? Yes  Is the patient taking the medications as directed? Yes  Does the patient/caregiver understand the medications and changes? Yes  Does updated med list accurately reflects meds patient is currently taking? Yes    Signature: Mary Jane Monroe NP

## 2024-07-21 VITALS
RESPIRATION RATE: 18 BRPM | TEMPERATURE: 98 F | SYSTOLIC BLOOD PRESSURE: 120 MMHG | DIASTOLIC BLOOD PRESSURE: 67 MMHG | OXYGEN SATURATION: 98 % | HEART RATE: 70 BPM

## 2024-07-21 PROBLEM — I48.0 PAROXYSMAL ATRIAL FIBRILLATION: Status: ACTIVE | Noted: 2024-07-21

## 2024-07-21 PROBLEM — E03.9 HYPOTHYROID: Status: ACTIVE | Noted: 2024-07-21

## 2024-07-21 PROBLEM — I73.9 PVD (PERIPHERAL VASCULAR DISEASE): Status: ACTIVE | Noted: 2024-07-21

## 2024-07-21 PROBLEM — R26.9 GAIT ABNORMALITY: Status: ACTIVE | Noted: 2024-07-21

## 2024-07-21 NOTE — PATIENT INSTRUCTIONS
Instructions:  - [facility] Nurse Practitioner to schedule home follow-up visit with patient in 4-6 weeks or as needed.  - Continue all medications, treatments and therapies as ordered.   - Follow all instructions, recommendations as discussed.  - Maintain Safety Precautions at all times.  - Attend all medical appointments as scheduled.  - For worsening symptoms: call Primary Care Physician or Nurse Practitioner.  - For emergencies, call 911 or immediately report to the nearest emergency room.  - Limit Risks of environmental exposure to coronavirus/COVID-19 as discussed including: social distancing, hand hygiene, and limiting departures from the home for necessities only.

## 2024-07-24 ENCOUNTER — TELEPHONE (OUTPATIENT)
Dept: HOME HEALTH SERVICES | Facility: CLINIC | Age: 89
End: 2024-07-24
Payer: MEDICARE

## 2024-07-24 DIAGNOSIS — I48.0 PAROXYSMAL ATRIAL FIBRILLATION: ICD-10-CM

## 2024-07-24 DIAGNOSIS — E03.9 HYPOTHYROIDISM, UNSPECIFIED TYPE: Primary | ICD-10-CM

## 2024-07-24 DIAGNOSIS — I73.9 PVD (PERIPHERAL VASCULAR DISEASE): ICD-10-CM

## 2024-07-24 NOTE — TELEPHONE ENCOUNTER
Orders placed per NP request for labs (CBC. CMP, TSH, T3 free, T4 free, PT/INR  Orders faxed to [facility].  Fax confirmation received.

## 2024-08-05 ENCOUNTER — OFFICE VISIT (OUTPATIENT)
Dept: PRIMARY CARE CLINIC | Facility: CLINIC | Age: 89
End: 2024-08-05
Payer: MEDICARE

## 2024-08-05 ENCOUNTER — PATIENT MESSAGE (OUTPATIENT)
Dept: CARDIOLOGY | Facility: CLINIC | Age: 89
End: 2024-08-05
Payer: MEDICARE

## 2024-08-05 ENCOUNTER — ANTI-COAG VISIT (OUTPATIENT)
Dept: CARDIOLOGY | Facility: CLINIC | Age: 89
End: 2024-08-05
Payer: MEDICARE

## 2024-08-05 VITALS
SYSTOLIC BLOOD PRESSURE: 146 MMHG | BODY MASS INDEX: 24.18 KG/M2 | DIASTOLIC BLOOD PRESSURE: 72 MMHG | OXYGEN SATURATION: 97 % | WEIGHT: 150.44 LBS | HEART RATE: 73 BPM | HEIGHT: 66 IN

## 2024-08-05 DIAGNOSIS — R79.9 ABNORMAL BLOOD CHEMISTRY: ICD-10-CM

## 2024-08-05 DIAGNOSIS — Z00.00 HEALTHCARE MAINTENANCE: ICD-10-CM

## 2024-08-05 DIAGNOSIS — Z76.89 ENCOUNTER TO ESTABLISH CARE: Primary | ICD-10-CM

## 2024-08-05 DIAGNOSIS — S72.142D CLOSED DISPLACED INTERTROCHANTERIC FRACTURE OF LEFT FEMUR WITH ROUTINE HEALING: ICD-10-CM

## 2024-08-05 DIAGNOSIS — I48.0 PAROXYSMAL ATRIAL FIBRILLATION: ICD-10-CM

## 2024-08-05 DIAGNOSIS — I73.9 PVD (PERIPHERAL VASCULAR DISEASE): ICD-10-CM

## 2024-08-05 DIAGNOSIS — R22.43 LOCALIZED SWELLING OF BOTH LOWER LEGS: ICD-10-CM

## 2024-08-05 DIAGNOSIS — Z78.0 POSTMENOPAUSE: ICD-10-CM

## 2024-08-05 DIAGNOSIS — E55.9 VITAMIN D DEFICIENCY: ICD-10-CM

## 2024-08-05 DIAGNOSIS — N18.30 STAGE 3 CHRONIC KIDNEY DISEASE, UNSPECIFIED WHETHER STAGE 3A OR 3B CKD: ICD-10-CM

## 2024-08-05 DIAGNOSIS — E03.9 HYPOTHYROIDISM, UNSPECIFIED TYPE: ICD-10-CM

## 2024-08-05 DIAGNOSIS — I48.0 PAROXYSMAL ATRIAL FIBRILLATION: Primary | ICD-10-CM

## 2024-08-05 PROCEDURE — 99204 OFFICE O/P NEW MOD 45 MIN: CPT | Mod: S$PBB,,, | Performed by: INTERNAL MEDICINE

## 2024-08-05 PROCEDURE — 99214 OFFICE O/P EST MOD 30 MIN: CPT | Mod: PBBFAC,PN | Performed by: INTERNAL MEDICINE

## 2024-08-05 PROCEDURE — 99999 PR PBB SHADOW E&M-EST. PATIENT-LVL IV: CPT | Mod: PBBFAC,,, | Performed by: INTERNAL MEDICINE

## 2024-08-05 RX ORDER — HYDRALAZINE HYDROCHLORIDE 50 MG/1
50 TABLET, FILM COATED ORAL 2 TIMES DAILY
COMMUNITY
Start: 2024-06-15

## 2024-08-05 RX ORDER — WARFARIN SODIUM 5 MG/1
5 TABLET ORAL DAILY
Qty: 30 TABLET | Refills: 1 | Status: SHIPPED | OUTPATIENT
Start: 2024-08-05

## 2024-08-05 RX ORDER — CHOLECALCIFEROL (VITAMIN D3) 25 MCG
1000 TABLET ORAL DAILY
COMMUNITY

## 2024-08-08 ENCOUNTER — PATIENT MESSAGE (OUTPATIENT)
Dept: PRIMARY CARE CLINIC | Facility: CLINIC | Age: 89
End: 2024-08-08
Payer: MEDICARE

## 2024-08-08 ENCOUNTER — HOSPITAL ENCOUNTER (OUTPATIENT)
Dept: RADIOLOGY | Facility: HOSPITAL | Age: 89
Discharge: HOME | End: 2024-08-08
Attending: INTERNAL MEDICINE
Payer: MEDICARE

## 2024-08-08 DIAGNOSIS — R22.43 LOCALIZED SWELLING OF BOTH LOWER LEGS: ICD-10-CM

## 2024-08-08 PROCEDURE — 93970 EXTREMITY STUDY: CPT | Mod: 26,,, | Performed by: RADIOLOGY

## 2024-08-08 PROCEDURE — 93970 EXTREMITY STUDY: CPT | Mod: TC

## 2024-08-13 ENCOUNTER — PATIENT MESSAGE (OUTPATIENT)
Dept: CARDIOLOGY | Facility: CLINIC | Age: 89
End: 2024-08-13

## 2024-08-13 ENCOUNTER — ANTI-COAG VISIT (OUTPATIENT)
Dept: CARDIOLOGY | Facility: CLINIC | Age: 89
End: 2024-08-13
Payer: MEDICARE

## 2024-08-13 ENCOUNTER — LAB VISIT (OUTPATIENT)
Dept: LAB | Facility: HOSPITAL | Age: 89
End: 2024-08-13
Attending: INTERNAL MEDICINE
Payer: MEDICARE

## 2024-08-13 DIAGNOSIS — E03.9 HYPOTHYROIDISM, UNSPECIFIED TYPE: ICD-10-CM

## 2024-08-13 DIAGNOSIS — N18.30 STAGE 3 CHRONIC KIDNEY DISEASE, UNSPECIFIED WHETHER STAGE 3A OR 3B CKD: ICD-10-CM

## 2024-08-13 DIAGNOSIS — I48.0 PAROXYSMAL ATRIAL FIBRILLATION: Primary | ICD-10-CM

## 2024-08-13 DIAGNOSIS — R79.9 ABNORMAL BLOOD CHEMISTRY: ICD-10-CM

## 2024-08-13 DIAGNOSIS — I73.9 PVD (PERIPHERAL VASCULAR DISEASE): ICD-10-CM

## 2024-08-13 DIAGNOSIS — I48.0 PAROXYSMAL ATRIAL FIBRILLATION: ICD-10-CM

## 2024-08-13 DIAGNOSIS — E55.9 VITAMIN D DEFICIENCY: ICD-10-CM

## 2024-08-13 LAB
25(OH)D3+25(OH)D2 SERPL-MCNC: 58 NG/ML (ref 30–96)
ALBUMIN SERPL BCP-MCNC: 3.9 G/DL (ref 3.5–5.2)
ALP SERPL-CCNC: 75 U/L (ref 55–135)
ALT SERPL W/O P-5'-P-CCNC: 12 U/L (ref 10–44)
ANION GAP SERPL CALC-SCNC: 11 MMOL/L (ref 8–16)
AST SERPL-CCNC: 17 U/L (ref 10–40)
BASOPHILS # BLD AUTO: 0.06 K/UL (ref 0–0.2)
BASOPHILS NFR BLD: 0.9 % (ref 0–1.9)
BILIRUB SERPL-MCNC: 0.6 MG/DL (ref 0.1–1)
BUN SERPL-MCNC: 24 MG/DL (ref 8–23)
CALCIUM SERPL-MCNC: 9.9 MG/DL (ref 8.7–10.5)
CHLORIDE SERPL-SCNC: 105 MMOL/L (ref 95–110)
CHOLEST SERPL-MCNC: 237 MG/DL (ref 120–199)
CHOLEST/HDLC SERPL: 4.9 {RATIO} (ref 2–5)
CO2 SERPL-SCNC: 22 MMOL/L (ref 23–29)
CREAT SERPL-MCNC: 1.4 MG/DL (ref 0.5–1.4)
DIFFERENTIAL METHOD BLD: ABNORMAL
EOSINOPHIL # BLD AUTO: 0.1 K/UL (ref 0–0.5)
EOSINOPHIL NFR BLD: 1.8 % (ref 0–8)
ERYTHROCYTE [DISTWIDTH] IN BLOOD BY AUTOMATED COUNT: 14.6 % (ref 11.5–14.5)
EST. GFR  (NO RACE VARIABLE): 36.4 ML/MIN/1.73 M^2
ESTIMATED AVG GLUCOSE: 108 MG/DL (ref 68–131)
GLUCOSE SERPL-MCNC: 92 MG/DL (ref 70–110)
HBA1C MFR BLD: 5.4 % (ref 4–5.6)
HCT VFR BLD AUTO: 34.2 % (ref 37–48.5)
HDLC SERPL-MCNC: 48 MG/DL (ref 40–75)
HDLC SERPL: 20.3 % (ref 20–50)
HGB BLD-MCNC: 11.4 G/DL (ref 12–16)
IMM GRANULOCYTES # BLD AUTO: 0.03 K/UL (ref 0–0.04)
IMM GRANULOCYTES NFR BLD AUTO: 0.5 % (ref 0–0.5)
INR PPP: 1.3 (ref 0.8–1.2)
INR PPP: 1.4 (ref 0.8–1.2)
LDLC SERPL CALC-MCNC: 154.6 MG/DL (ref 63–159)
LYMPHOCYTES # BLD AUTO: 1.8 K/UL (ref 1–4.8)
LYMPHOCYTES NFR BLD: 28 % (ref 18–48)
MCH RBC QN AUTO: 30.7 PG (ref 27–31)
MCHC RBC AUTO-ENTMCNC: 33.3 G/DL (ref 32–36)
MCV RBC AUTO: 92 FL (ref 82–98)
MONOCYTES # BLD AUTO: 0.8 K/UL (ref 0.3–1)
MONOCYTES NFR BLD: 11.9 % (ref 4–15)
NEUTROPHILS # BLD AUTO: 3.7 K/UL (ref 1.8–7.7)
NEUTROPHILS NFR BLD: 56.9 % (ref 38–73)
NONHDLC SERPL-MCNC: 189 MG/DL
NRBC BLD-RTO: 0 /100 WBC
PLATELET # BLD AUTO: 253 K/UL (ref 150–450)
PMV BLD AUTO: 9.5 FL (ref 9.2–12.9)
POTASSIUM SERPL-SCNC: 4.7 MMOL/L (ref 3.5–5.1)
PROT SERPL-MCNC: 7.1 G/DL (ref 6–8.4)
PROTHROMBIN TIME: 14.5 SEC (ref 9–12.5)
PROTHROMBIN TIME: 15.3 SEC (ref 9–12.5)
RBC # BLD AUTO: 3.71 M/UL (ref 4–5.4)
SODIUM SERPL-SCNC: 138 MMOL/L (ref 136–145)
T3FREE SERPL-MCNC: 2.1 PG/ML (ref 2.3–4.2)
T4 FREE SERPL-MCNC: 1.18 NG/DL (ref 0.71–1.51)
TRIGL SERPL-MCNC: 172 MG/DL (ref 30–150)
TSH SERPL DL<=0.005 MIU/L-ACNC: 1.58 UIU/ML (ref 0.4–4)
WBC # BLD AUTO: 6.53 K/UL (ref 3.9–12.7)

## 2024-08-13 PROCEDURE — 82306 VITAMIN D 25 HYDROXY: CPT | Performed by: INTERNAL MEDICINE

## 2024-08-13 PROCEDURE — 84443 ASSAY THYROID STIM HORMONE: CPT | Performed by: NURSE PRACTITIONER

## 2024-08-13 PROCEDURE — 84439 ASSAY OF FREE THYROXINE: CPT | Performed by: NURSE PRACTITIONER

## 2024-08-13 PROCEDURE — 80061 LIPID PANEL: CPT | Performed by: INTERNAL MEDICINE

## 2024-08-13 PROCEDURE — 36415 COLL VENOUS BLD VENIPUNCTURE: CPT | Mod: PO | Performed by: INTERNAL MEDICINE

## 2024-08-13 PROCEDURE — 85610 PROTHROMBIN TIME: CPT | Performed by: INTERNAL MEDICINE

## 2024-08-13 PROCEDURE — 93793 ANTICOAG MGMT PT WARFARIN: CPT | Mod: ,,,

## 2024-08-13 PROCEDURE — 83036 HEMOGLOBIN GLYCOSYLATED A1C: CPT | Performed by: INTERNAL MEDICINE

## 2024-08-13 PROCEDURE — 84481 FREE ASSAY (FT-3): CPT | Performed by: NURSE PRACTITIONER

## 2024-08-13 PROCEDURE — 85610 PROTHROMBIN TIME: CPT | Mod: 91 | Performed by: NURSE PRACTITIONER

## 2024-08-13 PROCEDURE — 80053 COMPREHEN METABOLIC PANEL: CPT | Performed by: NURSE PRACTITIONER

## 2024-08-13 PROCEDURE — 85025 COMPLETE CBC W/AUTO DIFF WBC: CPT | Performed by: NURSE PRACTITIONER

## 2024-08-13 NOTE — PROGRESS NOTES
[facility]    2024 11:52 AM    Assessment/Plan:    Patient presents today with subtherapeutic  INR.    Assessment of patient findings and chart review: pt reports no changes    Recommendation for patient's warfarin regimen: Boost dose today to 5mg then increase maintenance dose    Recommend repeat INR in 1 week  _________________________________________________________________    Sola Pitt (88 y.o.) is followed by the [facility].    Anticoagulation Summary  As of 2024      INR goal:  2.0-3.0   TTR:  --   INR used for dosin.3 (2024)   Warfarin maintenance plan:  5 mg (5 mg x 1) every Mon, Wed, Fri; 2.5 mg (5 mg x 0.5) all other days   Weekly warfarin total:  25 mg   Plan last modified:  Trinh Cordero, PharmD (2024)   Next INR check:  2024   Target end date:      Indications    Paroxysmal atrial fibrillation [I48.0]                 Anticoagulation Episode Summary       INR check location:  Anticoagulation Clinic    Preferred lab:      Send INR reminders to:  [facility] COUMADIN MONITORING POOL    Comments:  [facility]          Anticoagulation Care Providers       Provider Role Specialty Phone number    Rosy Rivera MD [facility] [phone]

## 2024-08-14 ENCOUNTER — PATIENT MESSAGE (OUTPATIENT)
Dept: PRIMARY CARE CLINIC | Facility: CLINIC | Age: 89
End: 2024-08-14
Payer: MEDICARE

## 2024-08-15 VITALS — SYSTOLIC BLOOD PRESSURE: 132 MMHG | DIASTOLIC BLOOD PRESSURE: 80 MMHG

## 2024-08-20 ENCOUNTER — ANTI-COAG VISIT (OUTPATIENT)
Dept: CARDIOLOGY | Facility: CLINIC | Age: 89
End: 2024-08-20
Payer: MEDICARE

## 2024-08-20 ENCOUNTER — LAB VISIT (OUTPATIENT)
Dept: LAB | Facility: HOSPITAL | Age: 89
End: 2024-08-20
Attending: INTERNAL MEDICINE
Payer: MEDICARE

## 2024-08-20 DIAGNOSIS — I48.0 PAROXYSMAL ATRIAL FIBRILLATION: Primary | ICD-10-CM

## 2024-08-20 DIAGNOSIS — I48.0 PAROXYSMAL ATRIAL FIBRILLATION: ICD-10-CM

## 2024-08-20 LAB
INR PPP: 1.4 (ref 0.8–1.2)
PROTHROMBIN TIME: 15.1 SEC (ref 9–12.5)

## 2024-08-20 PROCEDURE — 85610 PROTHROMBIN TIME: CPT | Performed by: INTERNAL MEDICINE

## 2024-08-20 PROCEDURE — 93793 ANTICOAG MGMT PT WARFARIN: CPT | Mod: ,,,

## 2024-08-20 PROCEDURE — 36415 COLL VENOUS BLD VENIPUNCTURE: CPT | Mod: PO | Performed by: INTERNAL MEDICINE

## 2024-08-20 NOTE — PROGRESS NOTES
[facility]    2024 4:30 PM    Assessment/Plan:    Patient presents today with subtherapeutic  INR.    Assessment of patient findings and chart review: pt reports no changes    Recommendation for patient's warfarin regimen: Boost dose today to 5mg then increase maintenance dose    Recommend repeat INR in 1 week  _________________________________________________________________    Sola Pitt (88 y.o.) is followed by the [email or web address].    Anticoagulation Summary  As of 2024      INR goal:  2.0-3.0   TTR:  0.0% (5 d)   INR used for dosin.4 (2024)   Warfarin maintenance plan:  2.5 mg (5 mg x 0.5) every Tue, Thu, Sat; 5 mg (5 mg x 1) all other days   Weekly warfarin total:  27.5 mg   Plan last modified:  Trinh Cordero, PharmD (2024)   Next INR check:  2024   Target end date:      Indications    Paroxysmal atrial fibrillation [I48.0]                 Anticoagulation Episode Summary       INR check location:  Anticoagulation Clinic    Preferred lab:      Send INR reminders to:  [facility] COUMADIN MONITORING POOL    Comments:  [facility]          Anticoagulation Care Providers       Provider Role Specialty Phone number    Rosy Rivera MD [facility] [phone]

## 2024-08-20 NOTE — PROGRESS NOTES
Patient will not have transportation to go to the lab until 9/3/24. She is also wanting to know if she could get home health. Please advice.

## 2024-08-22 ENCOUNTER — TELEPHONE (OUTPATIENT)
Dept: PRIMARY CARE CLINIC | Facility: CLINIC | Age: 89
End: 2024-08-22
Payer: MEDICARE

## 2024-08-22 NOTE — TELEPHONE ENCOUNTER
----- Message from Sherri Townsend sent at 8/22/2024 10:29 AM CDT -----  Regarding: Call back  Contact: [phone]  Who Called: PT     Patient called in requesting to speak with you. Did not specify why just said that she will need an order for her INR to be done at the [facility]. Please advise.

## 2024-08-22 NOTE — TELEPHONE ENCOUNTER
Initial Clinical Review    Admission: Date/Time/Statement: 6/4/18 @ 1350     Orders Placed This Encounter   Procedures    Inpatient Admission (expected length of stay for this patient is greater than two midnights)     Standing Status:   Standing     Number of Occurrences:   1     Order Specific Question:   Admitting Physician     Answer:   Jennifer Jamil     Order Specific Question:   Level of Care     Answer:   Med Surg [16]     Order Specific Question:   Estimated length of stay     Answer:   More than 2 Midnights     Order Specific Question:   Certification     Answer:   I certify that inpatient services are medically necessary for this patient for a duration of greater than two midnights  See H&P and MD Progress Notes for additional information about the patient's course of treatment  ED: Date/Time/Mode of Arrival:   ED Arrival Information     Expected Arrival Acuity Means of Arrival Escorted By Service Admission Type    - 6/4/2018 10:11 Emergent Wheelchair Self General Medicine Emergency    Arrival Complaint    DIZZY          Chief Complaint:   Chief Complaint   Patient presents with    Altered Mental Status     pts wife reports pt has been confused with c/p this morning  states SpO2 85% at home this morning  no home O2 but hx of COPD  scheduled for dialysis today  History of Illness: 73 y o  male who presents with confusion, nausea and vomiting  He patient is presently confused and drowsy, he is minimally participating in the history taking, history is obtained wall from his wife at bedside  She reports for the last 2 3 days he has been feeling poorly with poor appetite, he tried to eat yesterday he was nauseated and vomited he may have aspirated as well  She also reports patient is having urinary symptoms in the form of dysuria, increased frequency, hematuria    Patient also points towards his left flank and reports pain      He has a cough and brings up mucoid to Spoke to patient who informed me she would like home health orders    mucopurulent sputum  He was noted to have a fever of 100 9 at home yesterday  He has also noted to be lethargic feeling poorly  For the last couple of days his glucose has been running high in the 300s  ED Vital Signs:   ED Triage Vitals   Temperature Pulse Respirations Blood Pressure SpO2   06/04/18 1031 06/04/18 1031 06/04/18 1031 06/04/18 1031 06/04/18 1031   99 7 °F (37 6 °C) 91 16 131/65 (!) 89 %      Temp Source Heart Rate Source Patient Position - Orthostatic VS BP Location FiO2 (%)   06/04/18 1031 06/04/18 1031 06/04/18 1236 06/04/18 1236 --   Oral Monitor Lying Left arm       Pain Score       06/04/18 1031       9        Wt Readings from Last 1 Encounters:   06/04/18 69 kg (152 lb 1 9 oz)       Vital Signs (abnormal):  Low sat 89% room air  Maximum temperature 99 7  Maximum /72, low BP 97/61    Abnormal Labs/Diagnostic Test Results:   UA large leukocytes  Alkaline phosphatase 190  Total protein 8 7  Albumin 2 6    K 5 5  Cl 95  Anion gap 15  Bun 56  Creatinine 7 68   glucose 188  INR 1 51  Wbc 14 34  hgb 11 3, hct 35 7  Ct abdomen -  Small loculated appearing right pleural effusion partially subpulmonic, decreased   Again empyema is not excluded     2   Stable patchy consolidation at the right lung base   Patchy opacity in the left lower lobe may be slightly denser may be related to atelectasis or pneumonia  3  Small to moderate-sized pericardial effusion, increased  4   Mild to moderate biliary ductal dilatation, stable   1 7 cm sludge ball or stone at the distal CBD, appears more prominent compared to the prior exam   Additional sludge ball or stones identified in the distal CBD  CxR- Markedly enlarged cardiac silhouette with globular configuration   Correlate for underlying pericardial effusion    2   Mild vascular congestion with small bilateral pleural effusions, right side greater than left    Blood gas, venous [[id number]] (Abnormal) Collected: 06/04/18 1141   Order Status: Completed Lab Status: Final result Updated: 06/04/18 1202   Specimen: Blood from Arm, Left     pH, Neil 7 380 7 300 - 7 400     pCO2, Neil 34 9 (L) 42 0 - 50 0 mm Hg     pO2, Neil 56 2 (H) 35 0 - 45 0 mm Hg     HCO3, Neil 20 2 (L) 24 - 30 mmol/L     Base Excess, Neil -4 3 mmol/L     O2 Content, Neil 13 2 ml/dL     O2 HGB, VENOUS 83 6 (H) 60 0 - 80 0 %      1706 glucose 198  2057 glucose 265    0039- procalcitonin 13 88  INR 1 59    6/5/2018-  Na 131  Cl 92  Bun 35  Creatinine 5  11  Glucose 226  Phosphorous 6 1  Wbc 9 82, hgb 10, hct 31 4  ED Treatment: blood cultures  Medication Administration from 06/04/2018 1011 to 06/04/2018 1533       Date/Time Order Dose Route Action Action by Comments     06/04/2018 1235 cefepime (MAXIPIME) 2 g/50 mL dextrose IVPB 0 mg Intravenous Stopped Jodi Simmons RN      06/04/2018 1142 cefepime (MAXIPIME) 2 g/50 mL dextrose IVPB 2,000 mg Intravenous [address] Jodi Simmons RN      06/04/2018 1415 vancomycin (VANCOCIN) IVPB (premix) 1,000 mg 0 mg/kg Intravenous Stopped Jodi Simmons RN      06/04/2018 1235 vancomycin (VANCOCIN) IVPB (premix) 1,000 mg 1,000 mg Intravenous [address] Jodi Simmons RN      06/04/2018 1257 iohexol (OMNIPAQUE) 350 MG/ML injection (MULTI-DOSE) 90 mL 90 mL Intravenous Given Zainab Roper           Past Medical/Surgical History:    Active Ambulatory Problems     Diagnosis Date Noted    Type 1 diabetes mellitus with nephropathy ([facility] )     ESRD (end stage renal disease) on dialysis ([facility] )     Sepsis ([facility] ) 09/10/2016    Ambulatory dysfunction 12/12/2016    HCAP (healthcare-associated pneumonia) 02/20/2017    S/P percutaneous endoscopic gastrostomy (PEG) tube placement (HCC) 06/16/2017    Pleural effusion on right 06/16/2017    Chronic combined systolic and diastolic CHF (congestive heart failure) ([facility] ) 06/16/2017    R Fibula fracture, proximal to stump 06/17/2017    acute Encephalopathy, suspected metabolic 99/75/4257    DVT, lower extremity ([facility] ) 08/08/2017    Clostridium difficile infection 09/18/2017    Hyponatremia 09/24/2017    Acquired hypothyroidism     Chronic kidney disease-mineral and bone disorder 11/27/2017    Hyperkalemia 11/28/2017    Paroxysmal atrial fibrillation (HCC) 12/29/2017    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 ([facility] ) 01/31/2018    Myoclonic jerking 02/05/2018    Choledocholithiasis 02/28/2018    Chronic abdominal pain 03/01/2018    Abnormal urinalysis 03/01/2018    Dilated cardiomyopathy ([facility] ) 03/26/2018    Recurrent UTI 04/10/2018    Flank pain 04/12/2018    Nephrolithiasis 04/12/2018     Resolved Ambulatory Problems     Diagnosis Date Noted    Hypertension     Hypothyroidism     Neuropathy     Encephalopathy 09/10/2016    H/O Clostridium difficile infection 09/13/2016    Cellulitis of left thigh 11/10/2016    Cellulitis of right lower extremity 12/11/2016    Hyperglycemia 12/11/2016    Leg pain 12/11/2016    Stage 2 skin ulcer of sacral region ([facility] ) 12/12/2016    Hyponatremia 12/12/2016    Tobacco abuse 12/12/2016    Diabetic ulcer of right foot associated with type 1 diabetes mellitus ([facility] ) 12/12/2016    Hemoptysis 12/13/2016    Acute respiratory failure with hypoxia ([facility] ) 12/13/2016    Bursitis of left elbow 12/30/2016    Hypomagnesemia 12/30/2016    Lactic acidosis 12/30/2016    Encephalopathy acute 02/18/2017    Lethargy 02/18/2017    Cellulitis of right leg 02/18/2017    Rapid atrial fibrillation ([facility] ) 02/18/2017    Foot ulcer ([facility] ) 02/18/2017    Recurrent colitis due to Clostridium difficile 02/22/2017    Bacteremia 04/02/2017    Cardiac arrest ([facility] ) 04/02/2017    Dysphagia 06/16/2017    Wound of right leg on R BKA stump 06/16/2017    Transaminitis 06/16/2017    Urinary tract infection 06/19/2017    Syncope 07/07/2017    Epigastric pain 07/11/2017    HTN (hypertension) 08/08/2017    Goals of care, counseling/discussion 08/08/2017    Hyponatremia 08/08/2017    Acute encephalopathy 08/10/2017    DKA (diabetic ketoacidoses) ([place] [address] ) 22/08/6067    Metabolic acidosis, increased anion gap 09/17/2017    Hypoglycemia 09/20/2017    Anemia 09/20/2017    Thrombocytopenia (HCC) 09/22/2017    Elevated INR 11/28/2017    Hypotension 11/29/2017    Hyponatremia with excess extracellular fluid volume 01/31/2018    Benign hypertension with end-stage renal disease ([place] [address] ) 01/31/2018    Volume overload 02/01/2018    Prolonged Q-T interval on ECG 02/08/2018     Past Medical History:   Diagnosis Date    Acquired hypothyroidism     Anemia     Anxiety     Atrial fibrillation ([place] [address] )     Cellulitis of foot     Choledocholithiasis 2/28/2018    Chronic kidney disease-mineral and bone disorder 11/27/2017    Clostridium difficile infection 04/2017    Diabetes mellitus ([place] [address] )     Dialysis patient ([place] [address] )     Diarrhea     ESRD (end stage renal disease) on dialysis ([place] [address] )     GERD (gastroesophageal reflux disease)     History of transfusion 2017    Hx of cardiac arrest     Hypertension     Hyponatremia 8/8/2017    Irregular heart beat     Liver disease     Muscle weakness     Neuropathy     Right lower lobe pneumonia (HCC) 2/20/2017    Sepsis ([place] [address] ) 45/9503    Systolic and diastolic CH[facility], chronic ([facility])        Admitting Diagnosis: Altered mental status [R41 82]  ESRD (end stage renal disease) on dialysis ([place] [address] ) [N18 6, Z99 2]  Sepsis, due to unspecified organism ([place] [address] ) [A41 9]  Bilateral lower lobe pneumonia due to Escherichia coli ([place] [address] ) [J15 5]    Age/Sex: 73 y o  male    · Assessment/Plan: Encephalopathy likely metabolic versus infection related monitor patient closely, aggressive optimization of metabolic abnormalities  · Likely sepsis follow-up on cultures sent from the ED  · Possible HCAP check procalcitonin continue IV cefepime follow up on blood cultures sent from the ED, follow up on sputum cultures, urine Legionella strep antigen  · Possible urinary tract infection he does report urinary symptoms continue IV cefepime follow up on urine culture sensitivities     Plan for Additional Problems:   · End-stage renal disease on hemodialysis on dialysis now nephrology consultation  · Diabetes mellitus type 1 uncontrolled A1c 9 3 on 04/26/2018 now with hyperglycemia continue insulin monitor Accu-Cheks, will request endocrinology input if blood sugars are labile  · Paroxysmal atrial fibrillation on Coumadin monitor INR  · History of C diff infection will have him on p   O  vancomycin prophylaxis while is on antibiotics  · Chronic combined systolic and diastolic congestive heart failure monitor  Out of bed as able    Admission Orders: 6/4/2018  1350 INPATIENT   Scheduled Meds:   Current Facility-Administered Medications:  amiodarone 200 mg Oral Daily   b complex-vitamin C-folic acid 1 mg Oral Daily   calcium carbonate 1 tablet Oral BID With Meals   cefepime 1,000 mg Intravenous Q24H   cholecalciferol 1,000 Units Oral Daily   cholestyramine sugar free 4 g Oral Daily   citalopram 5 mg Oral Daily   diphenoxylate-atropine 1 tablet Oral 4x Daily PRN   gabapentin 200 mg Oral HS   insulin detemir 5 Units Subcutaneous Q12H [facility]   insulin lispro 1-5 Units Subcutaneous TID AC   insulin lispro 3 Units Subcutaneous TID With Meals   lanthanum 1,000 mg Oral TID With Meals   levothyroxine 150 mcg Oral Daily Before Breakfast   menthol-zinc oxide  Topical BID   midodrine 2 5 mg Oral BID   nystatin  Topical BID   pantoprazole 40 mg Oral Daily   QUEtiapine 25 mg Oral HS   saccharomyces boulardii 250 mg Oral BID   vancomycin 125 mg Oral Q12H [facility]   warfarin 10 mg Oral Daily (warfarin)     Continuous Infusions:    PRN Meds: not used    OTHER ORDERS: fingerstick glucose qid  scds  Telemetry  Consult nephrology  Hemodialysis MWF  PT/OT    Per nephrology- Checo Lolita is a 64-year-old  gentleman who was admitted to [facility] on June 4, 2018 after presenting with a change in mental status and increasing lethargy    A renal consultation is requested for assistance with management of ESRD on HD  Jami Chambers undergoes maintenance hemodialysis at [facility] on a Monday, Wednesday, Friday schedule      A/P  1  ESRD on HD ([facility], MWF): HD today has been arranged and he is currently getting it  2  Access: R arm AVF  3  Intradialytic hypotension: Continue Midodrine  4  Anemia: Hgb slightly above goal  On Mircera  No Epogen at this time  5  Mineral and bone disease: Continue oral phos binder  6  CHF, dilated cardiomyopathy: Appears compensated clinically but imaging indicates fluid overload  2 5 kg fluid removal on Hd today  7  Pericardial effusion  8  Encephalopathy  Hyperkalemia: 2K bath today      Thank you,  [address] [place] [address] in the [facility] by Graeme Michael for 2017  Network Utilization Review Department  Phone: [phone]; Fax [phone]  ATTENTION: The Network Utilization Review Department is now centralized for our 7 Facilities  Make a note that we have a new phone and fax numbers for our Department  Please call with any questions or concerns to [phone] and carefully follow the prompts so that you are directed to the right person  All voicemails are confidential  Fax any determinations, approvals, denials, and requests for initial or continue stay review clinical to [phone]  Due to HIGH CALL volume, it would be easier if you could please send faxed requests to expedite your requests and in part, help us provide discharge notifications faster

## 2024-08-23 ENCOUNTER — EXTERNAL HOME HEALTH (OUTPATIENT)
Dept: HOME HEALTH SERVICES | Facility: HOSPITAL | Age: 89
End: 2024-08-23
Payer: MEDICARE

## 2024-08-30 ENCOUNTER — TELEPHONE (OUTPATIENT)
Dept: PRIMARY CARE CLINIC | Facility: CLINIC | Age: 89
End: 2024-08-30
Payer: MEDICARE

## 2024-08-30 DIAGNOSIS — I73.9 PVD (PERIPHERAL VASCULAR DISEASE): Primary | ICD-10-CM

## 2024-08-30 DIAGNOSIS — R29.898 DECREASED STRENGTH OF LOWER EXTREMITY: ICD-10-CM

## 2024-08-30 DIAGNOSIS — S72.142D CLOSED DISPLACED INTERTROCHANTERIC FRACTURE OF LEFT FEMUR WITH ROUTINE HEALING: ICD-10-CM

## 2024-09-03 ENCOUNTER — ANTI-COAG VISIT (OUTPATIENT)
Dept: CARDIOLOGY | Facility: CLINIC | Age: 89
End: 2024-09-03
Payer: MEDICARE

## 2024-09-03 ENCOUNTER — LAB VISIT (OUTPATIENT)
Dept: LAB | Facility: HOSPITAL | Age: 89
End: 2024-09-03
Attending: INTERNAL MEDICINE
Payer: MEDICARE

## 2024-09-03 DIAGNOSIS — I48.0 PAROXYSMAL ATRIAL FIBRILLATION: Primary | ICD-10-CM

## 2024-09-03 DIAGNOSIS — I48.0 PAROXYSMAL ATRIAL FIBRILLATION: ICD-10-CM

## 2024-09-03 LAB
INR PPP: 1.7 (ref 0.8–1.2)
PROTHROMBIN TIME: 18.1 SEC (ref 9–12.5)

## 2024-09-03 PROCEDURE — 93793 ANTICOAG MGMT PT WARFARIN: CPT | Mod: ,,,

## 2024-09-03 PROCEDURE — 85610 PROTHROMBIN TIME: CPT | Performed by: INTERNAL MEDICINE

## 2024-09-03 PROCEDURE — 36415 COLL VENOUS BLD VENIPUNCTURE: CPT | Mod: PO | Performed by: INTERNAL MEDICINE

## 2024-09-03 RX ORDER — GABAPENTIN 300 MG/1
300 CAPSULE ORAL NIGHTLY
Qty: 90 CAPSULE | Refills: 3 | Status: SHIPPED | OUTPATIENT
Start: 2024-09-03

## 2024-09-03 RX ORDER — METOPROLOL SUCCINATE 100 MG/1
100 TABLET, EXTENDED RELEASE ORAL 2 TIMES DAILY
Qty: 180 TABLET | Refills: 0 | Status: SHIPPED | OUTPATIENT
Start: 2024-09-03 | End: 2024-12-02

## 2024-09-03 RX ORDER — WARFARIN SODIUM 5 MG/1
5 TABLET ORAL DAILY
Qty: 90 TABLET | Refills: 0 | Status: SHIPPED | OUTPATIENT
Start: 2024-09-03 | End: 2024-12-02

## 2024-09-03 RX ORDER — LEVOTHYROXINE SODIUM 75 UG/1
75 TABLET ORAL
Qty: 90 TABLET | Refills: 0 | Status: SHIPPED | OUTPATIENT
Start: 2024-09-03 | End: 2024-12-02

## 2024-09-03 RX ORDER — HYDRALAZINE HYDROCHLORIDE 50 MG/1
50 TABLET, FILM COATED ORAL 2 TIMES DAILY
Qty: 60 TABLET | Refills: 2 | Status: SHIPPED | OUTPATIENT
Start: 2024-09-03 | End: 2024-12-02

## 2024-09-03 NOTE — TELEPHONE ENCOUNTER
Jimmy, Rosy Merrill MD; ELI Gallegos Staff  Good morning,    Thank you for the referral to [facility]. Your patient was admitted by SN on 8/31/24.    Thanks,  Elana Marquez LPN  [facility]

## 2024-09-03 NOTE — PROGRESS NOTES
[facility]    2024 4:04 PM    Assessment/Plan:    Patient presents today with subtherapeutic  INR.    Assessment of patient findings and chart review: pt reports taking lower weekly dose than advised    Recommendation for patient's warfarin regimen: Boost dose today to 5 mg and resume CORRECT weekly dose per calendar, pt advised to write down and repeat back    Recommend repeat INR in 2 weeks, pt out of town next week and returns   _________________________________________________________________    Sola Pitt (88 y.o.) is followed by the [facility] [employer] [facility]tico[facility] Management Program.    Anticoagulation Summary  As of 9/3/2024      INR goal:  2.0-3.0   TTR:  0.0% (2.7 wk)   INR used for dosin.7 (9/3/2024)   Warfarin maintenance plan:  2.5 mg (5 mg x 0.5) every Tue, Thu, Sat; 5 mg (5 mg x 1) all other days   Weekly warfarin total:  27.5 mg   No change documented:  Tirnh Cordero, Talib   Plan last modified:  Trinh Cordero, Talib (2024)   Next INR check:  2024   Target end date:      Indications    Paroxysmal atrial fibrillation [I48.0]                 Anticoagulation Episode Summary       INR check location:  Anticoagulation Clinic    Preferred lab:      Send INR reminders to:  [facility] COUMADIN MONITORING POOL    Comments:  [facility]// pt not interested in meter          Anticoagulation Care Providers       Provider Role Specialty Phone number    Rosy Rivera MD [facility] [phone]

## 2024-09-16 ENCOUNTER — ANTI-COAG VISIT (OUTPATIENT)
Dept: CARDIOLOGY | Facility: CLINIC | Age: 89
End: 2024-09-16
Payer: MEDICARE

## 2024-09-16 ENCOUNTER — LAB VISIT (OUTPATIENT)
Dept: LAB | Facility: HOSPITAL | Age: 89
End: 2024-09-16
Attending: INTERNAL MEDICINE
Payer: MEDICARE

## 2024-09-16 DIAGNOSIS — I48.0 PAROXYSMAL ATRIAL FIBRILLATION: ICD-10-CM

## 2024-09-16 DIAGNOSIS — I48.0 PAROXYSMAL ATRIAL FIBRILLATION: Primary | ICD-10-CM

## 2024-09-16 LAB
INR PPP: 1.8 (ref 0.8–1.2)
PROTHROMBIN TIME: 19 SEC (ref 9–12.5)

## 2024-09-16 PROCEDURE — 93793 ANTICOAG MGMT PT WARFARIN: CPT | Mod: ,,,

## 2024-09-16 PROCEDURE — 85610 PROTHROMBIN TIME: CPT | Performed by: INTERNAL MEDICINE

## 2024-09-16 PROCEDURE — 36415 COLL VENOUS BLD VENIPUNCTURE: CPT | Mod: PO | Performed by: INTERNAL MEDICINE

## 2024-09-16 NOTE — PROGRESS NOTES
[facility]    2024 3:44 PM    Assessment/Plan:    Patient presents today with subtherapeutic  INR.    Assessment of patient findings and chart review: pt confirmed correct dose and reports no changes    Recommendation for patient's warfarin regimen: Increase maintenance dose    Recommend repeat INR in 2 weeks  _________________________________________________________________    Sola Pitt (88 y.o.) is followed by the [employer] Anticoagulation Management Program.    Anticoagulation Summary  As of 2024      INR goal:  2.0-3.0   TTR:  0.0% (1.1 mo)   INR used for dosin.8 (2024)   Warfarin maintenance plan:  2.5 mg (5 mg x 0.5) every Tue, Sat; 5 mg (5 mg x 1) all other days   Weekly warfarin total:  30 mg   Plan last modified:  Trinh Cordero, PharmD (2024)   Next INR check:  2024   Target end date:      Indications    Paroxysmal atrial fibrillation [I48.0]                 Anticoagulation Episode Summary       INR check location:  Anticoagulation Clinic    Preferred lab:      Send INR reminders to:  [facility] COUMADIN MONITORING POOL    Comments:  [facility]// pt not interested in meter          Anticoagulation Care Providers       Provider Role Specialty Phone number    Rosy Rivera MD [facility] [phone]

## 2024-09-23 ENCOUNTER — DOCUMENT SCAN (OUTPATIENT)
Dept: HOME HEALTH SERVICES | Facility: HOSPITAL | Age: 89
End: 2024-09-23
Payer: MEDICARE

## 2024-09-30 ENCOUNTER — LAB VISIT (OUTPATIENT)
Dept: LAB | Facility: HOSPITAL | Age: 89
End: 2024-09-30
Attending: INTERNAL MEDICINE
Payer: MEDICARE

## 2024-09-30 DIAGNOSIS — I48.0 PAROXYSMAL ATRIAL FIBRILLATION: ICD-10-CM

## 2024-09-30 LAB
INR PPP: 3.2 (ref 0.8–1.2)
PROTHROMBIN TIME: 32.4 SEC (ref 9–12.5)

## 2024-09-30 PROCEDURE — 85610 PROTHROMBIN TIME: CPT | Performed by: INTERNAL MEDICINE

## 2024-09-30 PROCEDURE — 36415 COLL VENOUS BLD VENIPUNCTURE: CPT | Mod: PO | Performed by: INTERNAL MEDICINE

## 2024-10-01 ENCOUNTER — ANTI-COAG VISIT (OUTPATIENT)
Dept: CARDIOLOGY | Facility: CLINIC | Age: 89
End: 2024-10-01
Payer: MEDICARE

## 2024-10-01 DIAGNOSIS — I48.0 PAROXYSMAL ATRIAL FIBRILLATION: Primary | ICD-10-CM

## 2024-10-01 PROCEDURE — 93793 ANTICOAG MGMT PT WARFARIN: CPT | Mod: ,,,

## 2024-10-01 NOTE — PROGRESS NOTES
[facility]    10/01/2024 8:47 AM    Assessment/Plan:    Patient presents today with slightly supratherapeutic INR.    Recommendation for patient's warfarin regimen: Continue current maintenance dose and pt advised to have 1 extra serving of dark greens today    Recommend repeat INR in 2 weeks  _________________________________________________________________    Sola Pitt (89 y.o.) is followed by the [facility]ticoagulation Management Program.    Anticoagulation Summary  As of 10/1/2024      INR goal:  2.0-3.0   TTR:  21.6% (1.5 mo)   INR used for dosing:  3.2 (9/30/2024)   Warfarin maintenance plan:  2.5 mg (5 mg x 0.5) every Tue, Sat; 5 mg (5 mg x 1) all other days   Weekly warfarin total:  30 mg   No change documented:  Trinh Cordero, Talib   Plan last modified:  Trinh Cordero PharmD (9/16/2024)   Next INR check:  10/14/2024   Target end date:  --    Indications    Paroxysmal atrial fibrillation [I48.0]                 Anticoagulation Episode Summary       INR check location:  Anticoagulation Clinic    Preferred lab:  --    Send INR reminders to:  [facility] COUMADIN MONITORING POOL    Comments:  [facility]// pt not interested in meter          Anticoagulation Care Providers       Provider Role Specialty Phone number    Rosy Rivera MD [facility] Medicine [phone]

## 2024-10-07 ENCOUNTER — EXTERNAL HOME HEALTH (OUTPATIENT)
Dept: HOME HEALTH SERVICES | Facility: HOSPITAL | Age: 89
End: 2024-10-07
Payer: MEDICARE

## 2024-10-14 ENCOUNTER — LAB VISIT (OUTPATIENT)
Dept: LAB | Facility: HOSPITAL | Age: 89
End: 2024-10-14
Attending: INTERNAL MEDICINE
Payer: MEDICARE

## 2024-10-14 ENCOUNTER — ANTI-COAG VISIT (OUTPATIENT)
Dept: CARDIOLOGY | Facility: CLINIC | Age: 89
End: 2024-10-14
Payer: MEDICARE

## 2024-10-14 DIAGNOSIS — I48.0 PAROXYSMAL ATRIAL FIBRILLATION: Primary | ICD-10-CM

## 2024-10-14 DIAGNOSIS — I48.0 PAROXYSMAL ATRIAL FIBRILLATION: ICD-10-CM

## 2024-10-14 LAB
INR PPP: 2.3 (ref 0.8–1.2)
PROTHROMBIN TIME: 24.3 SEC (ref 9–12.5)

## 2024-10-14 PROCEDURE — 85610 PROTHROMBIN TIME: CPT | Performed by: INTERNAL MEDICINE

## 2024-10-14 PROCEDURE — 36415 COLL VENOUS BLD VENIPUNCTURE: CPT | Mod: PO | Performed by: INTERNAL MEDICINE

## 2024-10-14 PROCEDURE — 93793 ANTICOAG MGMT PT WARFARIN: CPT | Mod: ,,,

## 2024-10-15 ENCOUNTER — PATIENT MESSAGE (OUTPATIENT)
Dept: CARDIOLOGY | Facility: CLINIC | Age: 89
End: 2024-10-15
Payer: MEDICARE

## 2024-10-15 NOTE — PROGRESS NOTES
[facility]    10/15/2024 8:12 AM    Assessment/Plan:    Patient presents today with therapeutic INR.    Recommendation for patient's warfarin regimen: Continue current maintenance dose    Recommend repeat INR in 3 weeks  _________________________________________________________________    Soladwaine Pitt (89 y.o.) is followed by the [employer] [facility] Anticoagulation Management Program.    Anticoagulation Summary  As of 10/14/2024      INR goal:  2.0-3.0   TTR:  34.6% (2 mo)   INR used for dosin.3 (10/14/2024)   Warfarin maintenance plan:  2.5 mg (5 mg x 0.5) every Tue, Sat; 5 mg (5 mg x 1) all other days   Weekly warfarin total:  30 mg   No change documented:  Trinh Cordero PharmD   Plan last modified:  Trinh Cordero PharmD (2024)   Next INR check:  2024   Target end date:  --    Indications    Paroxysmal atrial fibrillation [I48.0]                 Anticoagulation Episode Summary       INR check location:  Anticoagulation Clinic    Preferred lab:  --    Send INR reminders to:  [facility] COUMADIN MONITORING POOL    Comments:  [facility]atyana Lab// pt not interested in meter          Anticoagulation Care Providers       Provider Role Specialty Phone number    Rosy Rivera MD [facility] [phone]

## 2024-11-04 ENCOUNTER — ANTI-COAG VISIT (OUTPATIENT)
Dept: CARDIOLOGY | Facility: CLINIC | Age: 89
End: 2024-11-04
Payer: MEDICARE

## 2024-11-04 ENCOUNTER — LAB VISIT (OUTPATIENT)
Dept: LAB | Facility: HOSPITAL | Age: 89
End: 2024-11-04
Attending: INTERNAL MEDICINE
Payer: MEDICARE

## 2024-11-04 ENCOUNTER — PATIENT MESSAGE (OUTPATIENT)
Dept: CARDIOLOGY | Facility: CLINIC | Age: 89
End: 2024-11-04
Payer: MEDICARE

## 2024-11-04 ENCOUNTER — OFFICE VISIT (OUTPATIENT)
Dept: PRIMARY CARE CLINIC | Facility: CLINIC | Age: 89
End: 2024-11-04
Payer: MEDICARE

## 2024-11-04 VITALS
OXYGEN SATURATION: 96 % | BODY MASS INDEX: 24.84 KG/M2 | SYSTOLIC BLOOD PRESSURE: 138 MMHG | WEIGHT: 153.88 LBS | DIASTOLIC BLOOD PRESSURE: 78 MMHG | HEART RATE: 89 BPM

## 2024-11-04 DIAGNOSIS — E03.9 HYPOTHYROIDISM, UNSPECIFIED TYPE: ICD-10-CM

## 2024-11-04 DIAGNOSIS — E78.5 HYPERLIPIDEMIA, UNSPECIFIED HYPERLIPIDEMIA TYPE: ICD-10-CM

## 2024-11-04 DIAGNOSIS — I48.0 PAROXYSMAL ATRIAL FIBRILLATION: ICD-10-CM

## 2024-11-04 DIAGNOSIS — I10 PRIMARY HYPERTENSION: Primary | ICD-10-CM

## 2024-11-04 DIAGNOSIS — Z00.00 HEALTHCARE MAINTENANCE: ICD-10-CM

## 2024-11-04 DIAGNOSIS — I48.0 PAROXYSMAL ATRIAL FIBRILLATION: Primary | ICD-10-CM

## 2024-11-04 DIAGNOSIS — N18.30 STAGE 3 CHRONIC KIDNEY DISEASE, UNSPECIFIED WHETHER STAGE 3A OR 3B CKD: ICD-10-CM

## 2024-11-04 DIAGNOSIS — Z78.0 POSTMENOPAUSE: ICD-10-CM

## 2024-11-04 LAB
INR PPP: 2.6 (ref 0.8–1.2)
PROTHROMBIN TIME: 27.2 SEC (ref 9–12.5)

## 2024-11-04 PROCEDURE — 85610 PROTHROMBIN TIME: CPT | Performed by: INTERNAL MEDICINE

## 2024-11-04 PROCEDURE — 99999 PR PBB SHADOW E&M-EST. PATIENT-LVL III: CPT | Mod: PBBFAC,,, | Performed by: INTERNAL MEDICINE

## 2024-11-04 PROCEDURE — 99213 OFFICE O/P EST LOW 20 MIN: CPT | Mod: PBBFAC,PN,25 | Performed by: INTERNAL MEDICINE

## 2024-11-04 PROCEDURE — G0009 ADMIN PNEUMOCOCCAL VACCINE: HCPCS | Mod: PBBFAC,PN

## 2024-11-04 PROCEDURE — 99215 OFFICE O/P EST HI 40 MIN: CPT | Mod: S$PBB,,, | Performed by: INTERNAL MEDICINE

## 2024-11-04 PROCEDURE — 90677 PCV20 VACCINE IM: CPT | Mod: PBBFAC,PN

## 2024-11-04 PROCEDURE — 36415 COLL VENOUS BLD VENIPUNCTURE: CPT | Mod: PO | Performed by: INTERNAL MEDICINE

## 2024-11-04 PROCEDURE — 99999PBSHW PR PBB SHADOW TECHNICAL ONLY FILED TO HB: Mod: PBBFAC,,,

## 2024-11-04 RX ORDER — HYDRALAZINE HYDROCHLORIDE 50 MG/1
50 TABLET, FILM COATED ORAL DAILY
Start: 2024-11-04 | End: 2025-02-02

## 2024-11-04 RX ORDER — ATORVASTATIN CALCIUM 20 MG/1
20 TABLET, FILM COATED ORAL DAILY
Qty: 30 TABLET | Refills: 3 | Status: SHIPPED | OUTPATIENT
Start: 2024-11-04 | End: 2025-11-04

## 2024-11-04 RX ORDER — VALSARTAN AND HYDROCHLOROTHIAZIDE 320; 25 MG/1; MG/1
1 TABLET, FILM COATED ORAL DAILY
Qty: 90 TABLET | Refills: 3 | Status: SHIPPED | OUTPATIENT
Start: 2024-11-04

## 2024-11-04 RX ADMIN — PNEUMOCOCCAL 20-VALENT CONJUGATE VACCINE 0.5 ML
2.2; 2.2; 2.2; 2.2; 2.2; 2.2; 2.2; 2.2; 2.2; 2.2; 2.2; 2.2; 2.2; 2.2; 2.2; 2.2; 4.4; 2.2; 2.2; 2.2 INJECTION, SUSPENSION INTRAMUSCULAR at 02:11

## 2024-11-04 NOTE — PROGRESS NOTES
[facility]    2024 4:38 PM    Assessment/Plan:    Patient presents today with therapeutic INR.    Recommendation for patient's warfarin regimen: Continue current maintenance dose    Recommend repeat INR in 4 weeks  _________________________________________________________________    Soladwaine Pitt (89 y.o.) is followed by the [facility]tico[facility] Management Program.    Anticoagulation Summary  As of 2024      INR goal:  2.0-3.0   TTR:  51.5% (2.7 mo)   INR used for dosin.6 (2024)   Warfarin maintenance plan:  2.5 mg (5 mg x 0.5) every Tue, Sat; 5 mg (5 mg x 1) all other days   Weekly warfarin total:  30 mg   No change documented:  Trinh Cordero PharmD   Plan last modified:  Trinh Cordero PharmD (2024)   Next INR check:  2024   Target end date:  --    Indications    Paroxysmal atrial fibrillation [I48.0]                 Anticoagulation Episode Summary       INR check location:  Anticoagulation Clinic    Preferred lab:  --    Send INR reminders to:  [facility] COUMADIN MONITORING POOL    Comments:  [facility]// pt not interested in meter          Anticoagulation Care Providers       Provider Role Specialty Phone number    Rosy Rivera MD [facility] [phone]

## 2024-11-05 NOTE — ASSESSMENT & PLAN NOTE
- Assessed cholesterol levels; LDL at 154, significantly above target of <70 for vascular event prevention  - Emphasized the importance of LDL cholesterol <70 for preventing vascular events like strokes and heart attacks.  - Provided information on potential side effects of statins, including muscle inflammation and rare memory issues (inconsistent findings).  - Started atorvastatin for cholesterol management, 30-day supply with refills.  - Ordered cholesterol panel and liver function tests in 8 weeks.  - Contact office if experiencing new pains, weakness, or changes in urine color after starting statin therapy.

## 2024-11-05 NOTE — PROGRESS NOTES
Subjective:       Patient ID: Sola Pitt is a 89 y.o. female.    Chief Complaint:  3 month f/u      HPI  Sola Pitt is a 89 y.o. female with  hypertension, PAF, PVD, neuropathy, CKD 3, hypothyroid who presents today for  3 month f/u    Sola presents today for follow-up.      She reports mild swelling in her right ankle, which does not bother her. An ultrasound of her leg was normal. She denies significant swelling in the left leg. She continues to take Valsartan-HCTZ as prescribed by her previous physician in [place], but the prescription has  and she is requesting a refill. Denies chest pain, palpitations, or shortness of breath.    Warfarin managed by Warfarin clinic and last labs with therapeutic level. No evidence of bleeding.    She has a history of chronic kidney disease stage 3 (CKD3) and reports diligently increasing her fluid intake as previously recommended.    She has a history of high cholesterol with a recent LDL level of 154. She is not currently taking any cholesterol medication and expresses hesitation about starting statin therapy due to concerns about negative information she has heard.    She continues to receive physical therapy, reporting improved mobility and increased stability when standing. She currently uses a cane for ambulation.    She reports gaining a few lbs recently, acknowledging increased eating since her  passed away.    She has received flu and COVID vaccinations and is due for a pneumonia vaccine. She has a history of shingles and inquires about receiving the updated two-dose shingles vaccine, expressing willingness to do so.    She reports occasionally feeling cold but denies abnormal heat sensations. She reports adequate sleep and denies any trouble with bowel movements.      ROS:  General: -fever, -chills, -fatigue, +weight gain, -weight loss  Eyes: -vision changes, -redness, -discharge  ENT: -ear pain, -nasal congestion, -sore  throat  Cardiovascular: -chest pain, -palpitations, -lower extremity edema  Respiratory: -cough, -shortness of breath  Gastrointestinal: -abdominal pain, -nausea, -vomiting, -diarrhea, -constipation, -blood in stool, -change in bowel habits  Genitourinary: -dysuria, -hematuria, -frequency  Musculoskeletal: -joint pain, -muscle pain, +joint swelling  Skin: -rash, -lesion  Neurological: -headache, -dizziness, -numbness, -tingling  Psychiatric: -anxiety, -depression, -sleep difficulty            Past Medical History:   Diagnosis Date    Atrial fibrillation     Cellulitis     Hypertension     Thyroid disease        Past Surgical History:   Procedure Laterality Date    APPENDECTOMY      HIP FRACTURE SURGERY  2024    HYSTERECTOMY, VAGINAL, WITH SALPINGO-OOPHORECTOMY      KNEE ARTHROSCOPY Bilateral     MULTIPLE TOOTH EXTRACTIONS      TONSILLECTOMY         Family History   Problem Relation Name Age of Onset    Cancer Mother  66        Liver cancer    Hypertension Father      Stroke Father  59       Social History     Socioeconomic History    Marital status:    Tobacco Use    Smoking status: Never     Passive exposure: Never    Smokeless tobacco: Never   Substance and Sexual Activity    Alcohol use: Yes     Comment: Occasional    Drug use: Never    Sexual activity: Not Currently       Current Outpatient Medications   Medication Sig Dispense Refill    gabapentin (NEURONTIN) 300 MG capsule Take 1 capsule (300 mg total) by mouth every evening. 90 capsule 3    levothyroxine (SYNTHROID) 75 MCG tablet Take 1 tablet (75 mcg total) by mouth before breakfast. 90 tablet 0    metoprolol succinate (TOPROL-XL) 100 MG 24 hr tablet Take 1 tablet (100 mg total) by mouth 2 (two) times daily. 180 tablet 0    vitamin D (VITAMIN D3) 1000 units Tab Take 1,000 Units by mouth once daily.      warfarin (COUMADIN) 5 MG tablet Take 1 tablet (5 mg total) by mouth Daily. 90 tablet 0    atorvastatin (LIPITOR) 20 MG tablet Take 1 tablet (20 mg  "total) by mouth once daily. 30 tablet 3    hydrALAZINE (APRESOLINE) 50 MG tablet Take 1 tablet (50 mg total) by mouth Daily.      valsartan-hydrochlorothiazide (DIOVAN-HCT) 320-25 mg per tablet Take 1 tablet by mouth once daily. 90 tablet 3     No current facility-administered medications for this visit.       Review of patient's allergies indicates:  No Known Allergies      Objective:       Last 3 sets of Vitals        8/5/2024     1:11 PM 8/15/2024     5:28 PM 11/4/2024     1:23 PM   Vitals - 1 value per visit   SYSTOLIC 146 132 138   DIASTOLIC 72 80 78   Pulse 73  89   SPO2 97 %  96 %   Weight (lb) 150.46  153.88   Weight (kg) 68.25  69.8   Height 5' 6" (1.676 m)     BMI (Calculated) 24.3     Pain Score Zero  Zero   Physical Exam  Constitutional:       General: She is not in acute distress.     Appearance: Normal appearance.   HENT:      Head: Normocephalic.      Mouth/Throat:      Comments: Mild dry mucosa  Eyes:      General: No scleral icterus.     Extraocular Movements: Extraocular movements intact.      Conjunctiva/sclera: Conjunctivae normal.   Neck:      Comments: No goiter.  Cardiovascular:      Rate and Rhythm: Normal rate and regular rhythm.      Pulses: Normal pulses.      Heart sounds: Normal heart sounds.   Pulmonary:      Effort: Pulmonary effort is normal.      Breath sounds: Normal breath sounds.   Abdominal:      General: Bowel sounds are normal. There is no distension.      Palpations: Abdomen is soft. There is no mass.      Tenderness: There is no abdominal tenderness.   Musculoskeletal:         General: Swelling (trace ankle edema with no warmth or pain) present. Normal range of motion.   Lymphadenopathy:      Cervical: No cervical adenopathy.   Skin:     General: Skin is warm and dry.   Neurological:      General: No focal deficit present.      Mental Status: She is alert and oriented to person, place, and time.      Comments: Stands up without loss of balance, using cane to ambulate "   Psychiatric:         Mood and Affect: Mood normal.         Behavior: Behavior normal.           CBC:  Recent Labs   Lab 08/13/24  0654   WBC 6.53   RBC 3.71 L   Hemoglobin 11.4 L   Hematocrit 34.2 L   Platelets 253   MCV 92   MCH 30.7   MCHC 33.3     CMP:  Recent Labs   Lab 08/13/24  0654   Glucose 92   Calcium 9.9   Albumin 3.9   Total Protein 7.1   Sodium 138   Potassium 4.7   CO2 22 L   Chloride 105   BUN 24 H   Creatinine 1.4   Alkaline Phosphatase 75   ALT 12   AST 17   Total Bilirubin 0.6     URINALYSIS:       LIPIDS:  Recent Labs   Lab 08/13/24  0654 08/13/24  0716   TSH 1.576  --    HDL  --  48   Cholesterol  --  237 H   Triglycerides  --  172 H   LDL Cholesterol  --  154.6   HDL/Cholesterol Ratio  --  20.3   Non-HDL Cholesterol  --  189   Total Cholesterol/HDL Ratio  --  4.9     TSH:  Recent Labs   Lab 08/13/24  0654   TSH 1.576       A1C:  Recent Labs   Lab 08/13/24  0716   Hemoglobin A1C 5.4       Imaging:  US Lower Extremity Veins Bilateral  Narrative: EXAMINATION:  US LOWER EXTREMITY VEINS BILATERAL    CLINICAL HISTORY:  Localized swelling, mass and lump, lower limb, bilateral    TECHNIQUE:  Duplex and color flow Doppler and dynamic compression was performed of the bilateral lower extremity veins was performed.    COMPARISON:  None    FINDINGS:  Right thigh veins: The common femoral, femoral, popliteal, and greater saphenous veins are patent and free of thrombus. The veins are normally compressible and have normal phasic flow and augmentation response.    Right calf veins: The visualized calf veins are patent.    Left thigh veins: The common femoral, femoral, popliteal, and greater saphenous veins are patent and free of thrombus. The veins are normally compressible and have normal phasic flow and augmentation response.    Left calf veins: The visualized calf veins are patent.  Impression: No evidence of deep venous thrombosis in either lower extremity.    Electronically signed by resident: Maxi  Rosario  Date:    08/08/2024  Time:    14:47    Electronically signed by: Adri Mars MD  Date:    08/08/2024  Time:    16:05      Assessment:       1. Primary hypertension    2. Paroxysmal atrial fibrillation    3. Hyperlipidemia, unspecified hyperlipidemia type    4. Stage 3 chronic kidney disease, unspecified whether stage 3a or 3b CKD    5. Hypothyroidism, unspecified type    6. Postmenopause    7. Healthcare maintenance            Plan:       1. Primary hypertension  Overview:  On Valsartan- HCTZ 320-25 mg daily, and Hydralazine 50 mg daily    Assessment & Plan:  Stable blood pressure, continue monitoring with same treatment.    Orders:  -     valsartan-hydrochlorothiazide (DIOVAN-HCT) 320-25 mg per tablet; Take 1 tablet by mouth once daily.  Dispense: 90 tablet; Refill: 3  -     hydrALAZINE (APRESOLINE) 50 MG tablet; Take 1 tablet (50 mg total) by mouth Daily.    2. Paroxysmal atrial fibrillation  Overview:  On Coumadin 5 mg day      3. Hyperlipidemia, unspecified hyperlipidemia type  Assessment & Plan:  - Assessed cholesterol levels; LDL at 154, significantly above target of <70 for vascular event prevention  - Emphasized the importance of LDL cholesterol <70 for preventing vascular events like strokes and heart attacks.  - Provided information on potential side effects of statins, including muscle inflammation and rare memory issues (inconsistent findings).  - Started atorvastatin for cholesterol management, 30-day supply with refills.  - Ordered cholesterol panel and liver function tests in 8 weeks.  - Contact office if experiencing new pains, weakness, or changes in urine color after starting statin therapy.    Orders:  -     atorvastatin (LIPITOR) 20 MG tablet; Take 1 tablet (20 mg total) by mouth once daily.  Dispense: 30 tablet; Refill: 3  -     CBC Auto Differential; Future; Expected date: 11/04/2024  -     Comprehensive Metabolic Panel; Future; Expected date: 11/04/2024  -     Lipid Panel;  Future; Expected date: 11/04/2024    4. Stage 3 chronic kidney disease, unspecified whether stage 3a or 3b CKD  Overview:  On valsartan-HCTZ 320-25 mg day    Assessment & Plan:  Labs stable. Continue current efforts to increase fluid intake for kidney function.  Blood pressure has been stable and will monitor with same treatment.  Not on anti-inflammatories    Orders:  -     CBC Auto Differential; Future; Expected date: 11/04/2024  -     Comprehensive Metabolic Panel; Future; Expected date: 11/04/2024    5. Hypothyroidism, unspecified type  Overview:  On levothyroxine 75 mcg daily    Assessment & Plan:  - Clinically euthyroid.    - Assessed thyroid function and weight stability  - Continue to monitor labs with same strength.    Orders:  -     TSH; Future; Expected date: 11/04/2024  -     T4, Free; Future; Expected date: 11/04/2024    6. Postmenopause  Comments:  - Ordered bone density test.  - Schedule bone density scan at patient's convenience.  Orders:  -     DXA Bone Density Axial Skeleton 1 or more sites; Future; Expected date: 11/04/2024    7. Healthcare maintenance  Assessment & Plan:  - Yearly labs- 8/13/24  - Colon cancer screening- not indicated at her age  - ACP- Reports she brought [facility] forms but not uploaded yet.   - DEXA- ordered  - Vaccination- reports from [facility] will be reviewed. Reports due for Pneumonia vaccine and Shingels.    Orders:  -     pneumoc 20-darline conj-dip cr(PF) (PREVNAR-20 (PF)) injection Syrg 0.5 mL       FOLLOW UP:  - Follow up in 8 weeks after labs.        Health Maintenance Due   Topic Date Due    Shingles Vaccine (1 of 2) Never done    RSV Vaccine (Age 60+ and Pregnant patients) (1 - 1-dose 75+ series) Never done        I spent a total of 40 minutes on the day of the visit.This includes face to face time and non-face to face time preparing to see the patient (eg, review of tests), obtaining and/or reviewing separately obtained history, documenting clinical information in the  electronic or other health record, independently interpreting results and communicating results to the patient/family/caregiver, or care coordinator.     RETURN TO CLINIC IN:  2 months    FOR NEXT VISIT: ADVANCE DIRECTIVES, REVIEW LABS, and MEDICATION MONITORING       Rosy Rivera MD  [facility]  Disclaimer:  This note has been generated using voice-recognition software. There may be grammatical or spelling errors that have been missed during proof-reading

## 2024-11-05 NOTE — ASSESSMENT & PLAN NOTE
- Clinically euthyroid.    - Assessed thyroid function and weight stability  - Continue to monitor labs with same strength.

## 2024-11-05 NOTE — ASSESSMENT & PLAN NOTE
- Yearly labs- 8/13/24  - Colon cancer screening- not indicated at her age  - ACP- Reports she brought [facility] forms but not uploaded yet.   - DEXA- ordered  - Vaccination- reports from [facility] will be reviewed. Reports due for Pneumonia vaccine and Shingels.

## 2024-11-05 NOTE — ASSESSMENT & PLAN NOTE
Labs stable. Continue current efforts to increase fluid intake for kidney function.  Blood pressure has been stable and will monitor with same treatment.  Not on anti-inflammatories

## 2024-12-02 ENCOUNTER — LAB VISIT (OUTPATIENT)
Dept: LAB | Facility: HOSPITAL | Age: 89
End: 2024-12-02
Attending: INTERNAL MEDICINE
Payer: MEDICARE

## 2024-12-02 ENCOUNTER — ANTI-COAG VISIT (OUTPATIENT)
Dept: CARDIOLOGY | Facility: CLINIC | Age: 89
End: 2024-12-02
Payer: MEDICARE

## 2024-12-02 DIAGNOSIS — I48.0 PAROXYSMAL ATRIAL FIBRILLATION: Primary | ICD-10-CM

## 2024-12-02 DIAGNOSIS — I48.0 PAROXYSMAL ATRIAL FIBRILLATION: ICD-10-CM

## 2024-12-02 LAB
INR PPP: 2.9 (ref 0.8–1.2)
PROTHROMBIN TIME: 29.8 SEC (ref 9–12.5)

## 2024-12-02 PROCEDURE — 36415 COLL VENOUS BLD VENIPUNCTURE: CPT | Mod: PO | Performed by: INTERNAL MEDICINE

## 2024-12-02 PROCEDURE — 85610 PROTHROMBIN TIME: CPT | Performed by: INTERNAL MEDICINE

## 2024-12-02 PROCEDURE — 93793 ANTICOAG MGMT PT WARFARIN: CPT | Mod: S$GLB,,,

## 2024-12-02 NOTE — PROGRESS NOTES
[facility]    2024 3:32 PM    Assessment/Plan:    Patient presents today with therapeutic INR.    Assessment of patient findings and chart review: INR at goal. No changes noted to necessitate adjustment to warfarin regimen.     Recommendation for patient's warfarin regimen: Continue current maintenance dose    Recommend repeat INR in 4 weeks  _________________________________________________________________    Sola Pitt (89 y.o.) is followed by [facility].    Anticoagulation Summary  As of 2024      INR goal:  2.0-3.0   TTR:  63.9% (3.6 mo)   INR used for dosin.9 (2024)   Warfarin maintenance plan:  2.5 mg (5 mg x 0.5) every Tue, Sat; 5 mg (5 mg x 1) all other days   Weekly warfarin total:  30 mg   Plan last modified:  Trinh Cordero, PharmD (2024)   Next INR check:  2024   Target end date:  --    Indications    Paroxysmal atrial fibrillation [I48.0]                 Anticoagulation Episode Summary       INR check location:  Anticoagulation Clinic    Preferred lab:  --    Send INR reminders to:  [facility] COUMADIN MONITORING POOL    Comments:  [facility]// pt not interested in meter          Anticoagulation Care Providers       Provider Role Specialty Phone number    Rosy Rivera MD [facility] [phone]

## 2024-12-04 ENCOUNTER — EXTERNAL HOME HEALTH (OUTPATIENT)
Dept: HOME HEALTH SERVICES | Facility: HOSPITAL | Age: 89
End: 2024-12-04
Payer: MEDICARE

## 2024-12-20 DIAGNOSIS — I10 PRIMARY HYPERTENSION: ICD-10-CM

## 2024-12-20 RX ORDER — METOPROLOL SUCCINATE 100 MG/1
100 TABLET, EXTENDED RELEASE ORAL 2 TIMES DAILY
Qty: 180 TABLET | Refills: 0 | Status: SHIPPED | OUTPATIENT
Start: 2024-12-20

## 2024-12-20 RX ORDER — LEVOTHYROXINE SODIUM 75 UG/1
75 TABLET ORAL
Qty: 90 TABLET | Refills: 0 | Status: SHIPPED | OUTPATIENT
Start: 2024-12-20

## 2024-12-20 RX ORDER — HYDRALAZINE HYDROCHLORIDE 50 MG/1
50 TABLET, FILM COATED ORAL 2 TIMES DAILY
Qty: 180 TABLET | Refills: 0 | Status: SHIPPED | OUTPATIENT
Start: 2024-12-20

## 2024-12-20 RX ORDER — WARFARIN SODIUM 5 MG/1
5 TABLET ORAL DAILY
Qty: 90 TABLET | Refills: 0 | Status: SHIPPED | OUTPATIENT
Start: 2024-12-20 | End: 2025-03-20

## 2024-12-20 NOTE — TELEPHONE ENCOUNTER
----- Message from Ginette sent at 12/20/2024  9:23 AM CST -----  Contact: [phone]  Requesting an RX refill or new RX.    Is this a refill or new RX: New     RX name and strength (copy/paste from chart):  warfarin (COUMADIN) 5 MG tablet     Is this a 30 day or 90 day RX: 90    Pharmacy name and phone # (copy/paste from chart):    [facility] [address] [place] [address] [place] [facility] [address] [address] [facility] [place] [address]  Phone: [phone] Fax: [phone]

## 2024-12-27 ENCOUNTER — HOSPITAL ENCOUNTER (OUTPATIENT)
Dept: RADIOLOGY | Facility: HOSPITAL | Age: 89
Discharge: HOME | End: 2024-12-27
Attending: INTERNAL MEDICINE
Payer: MEDICARE

## 2024-12-27 DIAGNOSIS — Z78.0 POSTMENOPAUSE: ICD-10-CM

## 2024-12-27 PROCEDURE — 77080 DXA BONE DENSITY AXIAL: CPT | Mod: TC

## 2024-12-27 PROCEDURE — 77080 DXA BONE DENSITY AXIAL: CPT | Mod: 26,,, | Performed by: INTERNAL MEDICINE

## 2024-12-30 ENCOUNTER — ANTI-COAG VISIT (OUTPATIENT)
Dept: CARDIOLOGY | Facility: CLINIC | Age: 89
End: 2024-12-30
Payer: MEDICARE

## 2024-12-30 ENCOUNTER — LAB VISIT (OUTPATIENT)
Dept: LAB | Facility: HOSPITAL | Age: 89
End: 2024-12-30
Attending: INTERNAL MEDICINE
Payer: MEDICARE

## 2024-12-30 DIAGNOSIS — I48.0 PAROXYSMAL ATRIAL FIBRILLATION: ICD-10-CM

## 2024-12-30 DIAGNOSIS — I48.0 PAROXYSMAL ATRIAL FIBRILLATION: Primary | ICD-10-CM

## 2024-12-30 LAB
INR PPP: 2.3 (ref 0.8–1.2)
PROTHROMBIN TIME: 23.6 SEC (ref 9–12.5)

## 2024-12-30 PROCEDURE — 36415 COLL VENOUS BLD VENIPUNCTURE: CPT | Mod: PO | Performed by: INTERNAL MEDICINE

## 2024-12-30 PROCEDURE — 85610 PROTHROMBIN TIME: CPT | Performed by: INTERNAL MEDICINE

## 2024-12-30 PROCEDURE — 93793 ANTICOAG MGMT PT WARFARIN: CPT | Mod: ,,,

## 2024-12-30 NOTE — PROGRESS NOTES
[facility]    2024 3:57 PM    Assessment/Plan:    Patient presents today with therapeutic INR.    Recommendation for patient's warfarin regimen: Continue current maintenance dose    Recommend repeat INR in 5 weeks  _________________________________________________________________    Sola Pitt (89 y.o.) is followed by the [facility].    Anticoagulation Summary  As of 2024      INR goal:  2.0-3.0   TTR:  71.3% (4.6 mo)   INR used for dosin.3 (2024)   Warfarin maintenance plan:  2.5 mg (5 mg x 0.5) every Tue, Sat; 5 mg (5 mg x 1) all other days   Weekly warfarin total:  30 mg   No change documented:  Trinh Cordero PharmD   Plan last modified:  Trinh Cordero PharmD (2024)   Next INR check:  2/3/2025   Target end date:  --    Indications    Paroxysmal atrial fibrillation [I48.0]                 Anticoagulation Episode Summary       INR check location:  Anticoagulation Clinic    Preferred lab:  --    Send INR reminders to:  [facility] COUMADIN MONITORING POOL    Comments:  [facility]// pt not interested in meter          Anticoagulation Care Providers       Provider Role Specialty Phone number    Rosy Rivera MD [facility] [phone]

## 2025-01-06 ENCOUNTER — LAB VISIT (OUTPATIENT)
Dept: LAB | Facility: HOSPITAL | Age: 89
End: 2025-01-06
Attending: INTERNAL MEDICINE
Payer: MEDICARE

## 2025-01-06 ENCOUNTER — PATIENT MESSAGE (OUTPATIENT)
Dept: PRIMARY CARE CLINIC | Facility: CLINIC | Age: 89
End: 2025-01-06
Payer: MEDICARE

## 2025-01-06 DIAGNOSIS — N18.30 STAGE 3 CHRONIC KIDNEY DISEASE, UNSPECIFIED WHETHER STAGE 3A OR 3B CKD: ICD-10-CM

## 2025-01-06 DIAGNOSIS — E78.5 HYPERLIPIDEMIA, UNSPECIFIED HYPERLIPIDEMIA TYPE: ICD-10-CM

## 2025-01-06 DIAGNOSIS — E03.9 HYPOTHYROIDISM, UNSPECIFIED TYPE: ICD-10-CM

## 2025-01-06 LAB
ALBUMIN SERPL BCP-MCNC: 4 G/DL (ref 3.5–5.2)
ALP SERPL-CCNC: 83 U/L (ref 40–150)
ALT SERPL W/O P-5'-P-CCNC: 12 U/L (ref 10–44)
ANION GAP SERPL CALC-SCNC: 10 MMOL/L (ref 8–16)
AST SERPL-CCNC: 19 U/L (ref 10–40)
BASOPHILS # BLD AUTO: 0.08 K/UL (ref 0–0.2)
BASOPHILS NFR BLD: 1.1 % (ref 0–1.9)
BILIRUB SERPL-MCNC: 0.5 MG/DL (ref 0.1–1)
BUN SERPL-MCNC: 27 MG/DL (ref 8–23)
CALCIUM SERPL-MCNC: 9.7 MG/DL (ref 8.7–10.5)
CHLORIDE SERPL-SCNC: 105 MMOL/L (ref 95–110)
CHOLEST SERPL-MCNC: 243 MG/DL (ref 120–199)
CHOLEST/HDLC SERPL: 5.1 {RATIO} (ref 2–5)
CO2 SERPL-SCNC: 22 MMOL/L (ref 23–29)
CREAT SERPL-MCNC: 1.4 MG/DL (ref 0.5–1.4)
DIFFERENTIAL METHOD BLD: ABNORMAL
EOSINOPHIL # BLD AUTO: 0.2 K/UL (ref 0–0.5)
EOSINOPHIL NFR BLD: 2.2 % (ref 0–8)
ERYTHROCYTE [DISTWIDTH] IN BLOOD BY AUTOMATED COUNT: 14 % (ref 11.5–14.5)
EST. GFR  (NO RACE VARIABLE): 36.2 ML/MIN/1.73 M^2
GLUCOSE SERPL-MCNC: 89 MG/DL (ref 70–110)
HCT VFR BLD AUTO: 36.4 % (ref 37–48.5)
HDLC SERPL-MCNC: 48 MG/DL (ref 40–75)
HDLC SERPL: 19.8 % (ref 20–50)
HGB BLD-MCNC: 11.7 G/DL (ref 12–16)
IMM GRANULOCYTES # BLD AUTO: 0.02 K/UL (ref 0–0.04)
IMM GRANULOCYTES NFR BLD AUTO: 0.3 % (ref 0–0.5)
LDLC SERPL CALC-MCNC: 167 MG/DL (ref 63–159)
LYMPHOCYTES # BLD AUTO: 1.9 K/UL (ref 1–4.8)
LYMPHOCYTES NFR BLD: 26.7 % (ref 18–48)
MCH RBC QN AUTO: 30.5 PG (ref 27–31)
MCHC RBC AUTO-ENTMCNC: 32.1 G/DL (ref 32–36)
MCV RBC AUTO: 95 FL (ref 82–98)
MONOCYTES # BLD AUTO: 0.8 K/UL (ref 0.3–1)
MONOCYTES NFR BLD: 11.2 % (ref 4–15)
NEUTROPHILS # BLD AUTO: 4.2 K/UL (ref 1.8–7.7)
NEUTROPHILS NFR BLD: 58.5 % (ref 38–73)
NONHDLC SERPL-MCNC: 195 MG/DL
NRBC BLD-RTO: 0 /100 WBC
PLATELET # BLD AUTO: 262 K/UL (ref 150–450)
PMV BLD AUTO: 9.7 FL (ref 9.2–12.9)
POTASSIUM SERPL-SCNC: 4.7 MMOL/L (ref 3.5–5.1)
PROT SERPL-MCNC: 7.7 G/DL (ref 6–8.4)
RBC # BLD AUTO: 3.84 M/UL (ref 4–5.4)
SODIUM SERPL-SCNC: 137 MMOL/L (ref 136–145)
T4 FREE SERPL-MCNC: 1.21 NG/DL (ref 0.71–1.51)
TRIGL SERPL-MCNC: 140 MG/DL (ref 30–150)
TSH SERPL DL<=0.005 MIU/L-ACNC: 1.64 UIU/ML (ref 0.4–4)
WBC # BLD AUTO: 7.12 K/UL (ref 3.9–12.7)

## 2025-01-06 PROCEDURE — 84439 ASSAY OF FREE THYROXINE: CPT | Performed by: INTERNAL MEDICINE

## 2025-01-06 PROCEDURE — 84443 ASSAY THYROID STIM HORMONE: CPT | Performed by: INTERNAL MEDICINE

## 2025-01-06 PROCEDURE — 80061 LIPID PANEL: CPT | Performed by: INTERNAL MEDICINE

## 2025-01-06 PROCEDURE — 85025 COMPLETE CBC W/AUTO DIFF WBC: CPT | Performed by: INTERNAL MEDICINE

## 2025-01-06 PROCEDURE — 36415 COLL VENOUS BLD VENIPUNCTURE: CPT | Mod: PO | Performed by: INTERNAL MEDICINE

## 2025-01-06 PROCEDURE — 80053 COMPREHEN METABOLIC PANEL: CPT | Performed by: INTERNAL MEDICINE

## 2025-01-06 RX ORDER — LEVOTHYROXINE SODIUM 75 UG/1
75 TABLET ORAL
Qty: 90 TABLET | Refills: 0 | Status: SHIPPED | OUTPATIENT
Start: 2025-01-06

## 2025-01-07 RX ORDER — AMLODIPINE BESYLATE 5 MG/1
5 TABLET ORAL DAILY
Qty: 30 TABLET | Refills: 3 | Status: SHIPPED | OUTPATIENT
Start: 2025-01-07 | End: 2026-01-07

## 2025-01-07 RX ORDER — VALSARTAN 320 MG/1
320 TABLET ORAL DAILY
Qty: 90 TABLET | Refills: 3 | Status: SHIPPED | OUTPATIENT
Start: 2025-01-07 | End: 2026-01-07

## 2025-01-09 RX ORDER — METOPROLOL SUCCINATE 100 MG/1
100 TABLET, EXTENDED RELEASE ORAL 2 TIMES DAILY
Qty: 180 TABLET | Refills: 0 | Status: SHIPPED | OUTPATIENT
Start: 2025-01-09

## 2025-01-13 ENCOUNTER — OFFICE VISIT (OUTPATIENT)
Dept: PRIMARY CARE CLINIC | Facility: CLINIC | Age: 89
End: 2025-01-13
Payer: MEDICARE

## 2025-01-13 VITALS
DIASTOLIC BLOOD PRESSURE: 70 MMHG | BODY MASS INDEX: 25.17 KG/M2 | OXYGEN SATURATION: 96 % | WEIGHT: 156.63 LBS | HEART RATE: 73 BPM | SYSTOLIC BLOOD PRESSURE: 110 MMHG | HEIGHT: 66 IN

## 2025-01-13 DIAGNOSIS — E03.9 HYPOTHYROIDISM, UNSPECIFIED TYPE: ICD-10-CM

## 2025-01-13 DIAGNOSIS — M80.00XS AGE-RELATED OSTEOPOROSIS WITH CURRENT PATHOLOGICAL FRACTURE, SEQUELA: ICD-10-CM

## 2025-01-13 DIAGNOSIS — N18.32 STAGE 3B CHRONIC KIDNEY DISEASE: ICD-10-CM

## 2025-01-13 DIAGNOSIS — Z00.00 HEALTHCARE MAINTENANCE: ICD-10-CM

## 2025-01-13 DIAGNOSIS — I10 PRIMARY HYPERTENSION: Primary | ICD-10-CM

## 2025-01-13 DIAGNOSIS — T46.6X5A STATIN MYOPATHY: ICD-10-CM

## 2025-01-13 DIAGNOSIS — E78.5 HYPERLIPIDEMIA, UNSPECIFIED HYPERLIPIDEMIA TYPE: ICD-10-CM

## 2025-01-13 DIAGNOSIS — I48.0 PAROXYSMAL ATRIAL FIBRILLATION: ICD-10-CM

## 2025-01-13 DIAGNOSIS — S72.142D CLOSED DISPLACED INTERTROCHANTERIC FRACTURE OF LEFT FEMUR WITH ROUTINE HEALING: ICD-10-CM

## 2025-01-13 DIAGNOSIS — G72.0 STATIN MYOPATHY: ICD-10-CM

## 2025-01-13 PROCEDURE — 99999 PR PBB SHADOW E&M-EST. PATIENT-LVL IV: CPT | Mod: PBBFAC,,, | Performed by: INTERNAL MEDICINE

## 2025-01-13 PROCEDURE — 99215 OFFICE O/P EST HI 40 MIN: CPT | Mod: S$PBB,,, | Performed by: INTERNAL MEDICINE

## 2025-01-13 PROCEDURE — 99214 OFFICE O/P EST MOD 30 MIN: CPT | Mod: PBBFAC,PN | Performed by: INTERNAL MEDICINE

## 2025-01-13 RX ORDER — ERYTHROMYCIN 5 MG/G
OINTMENT OPHTHALMIC EVERY 8 HOURS
Qty: 3.5 G | Refills: 0 | Status: SHIPPED | OUTPATIENT
Start: 2025-01-13

## 2025-01-13 RX ORDER — CALCIUM CARBONATE 500(1250)
1 TABLET,CHEWABLE ORAL 2 TIMES DAILY
Qty: 60 TABLET | Refills: 11 | Status: SHIPPED | OUTPATIENT
Start: 2025-01-13 | End: 2026-01-13

## 2025-01-13 NOTE — PROGRESS NOTES
Subjective:       Patient ID: Sola Pitt is a 89 y.o. female.    Chief Complaint: Hypertension      HPI  Sola Pitt is a 89 y.o. female with hypertension, PAF, PVD, neuropathy, CKD 3, hypothyroid  who presents today for Hypertension    Sola presents today to discuss medication management and tests.    She has chronic kidney disease stage 3B, approaching stage 4. BUN levels have been progressively increasing with recent values of 22, 24, and 27. Recommended to change blood pressure medication to remove diuretic and added amlodipine.    She takes Valsartan HCT, and Hydralazine daily (instead of twice a day) for blood pressure control. Medications are taken at 5 AM and 5 PM. Hydrochlorothiazide has been discontinued to help noted increasing BUN and decreasing GFR. Amlodipine added but not started yet. Will monitor blood pressure twice a day to evaluate changes and need of Hydralazine twice a day.    She discontinued statin therapy due to muscle aches and declines to resume. Desires to continue conservative management instead of trying other medications.    She has severe osteoporosis. She declines Fosamax and is considering Prolia for management. She is not currently taking calcium or vitamin D supplements. Considering significant changes agrees to place econsult to endocrinology but not interested in daily injections.    She reports a red and enlarged lower eyelid, consistent with a stye. She has been applying warm compresses to the affected area.      ROS:  General: -fever, -chills, -fatigue, -weight gain, -weight loss  Eyes: -vision changes, -redness, -discharge, +eye pain  ENT: -ear pain, -nasal congestion, -sore throat  Cardiovascular: -chest pain, -palpitations, -lower extremity edema  Respiratory: -cough, -shortness of breath  Gastrointestinal: -abdominal pain, -nausea, -vomiting, -diarrhea, -constipation, -blood in stool  Genitourinary: -dysuria, -hematuria, -frequency  Musculoskeletal: -joint  pain, +muscle pain  Skin: -rash, -lesion  Neurological: -headache, -dizziness, -numbness, -tingling  Psychiatric: -anxiety, -depression, -sleep difficulty        Advance Care Planning     Date: 01/14/2025    Living Will  During this visit, I engaged the patient and family  in the voluntary advance care planning process.  The patient and I reviewed the role for advance directives (on previous visit) and their purpose in directing future healthcare if the patient's unable to speak for him/herself.  At this point in time, the patient does have full decision-making capacity.  We discussed different extreme health states that she could experience, and reviewed what kind of medical care she would want in those situations.  The patient and family communicated that if she were comatose and had little chance of a meaningful recovery, she would not want machines/life-sustaining treatments used.  I spent a total of 6 minutes engaging the patient in this advance care planning discussion.        Power of   I initiated the process of voluntary advance care planning today and explained the importance of this process to the patient.  I introduced the concept of advance directives to the patient, as well. Then the patient received detailed information about the importance of designating a Health Care Power of  (HCPOA). She was also instructed to communicate with this person about their wishes for future healthcare, should she become sick and lose decision-making capacity. The patient has previously appointed a HCPOA. After our discussion, the patient has decided to complete a HCPOA and has appointed her  siblings , health care agent:  Toño Wood, [employer]  & health care agent number:  [phone], [phone], [phone] . I encouraged her to communicate with this person about their wishes for future healthcare, should she become sick and lose decision-making capacity.      A  total of 6 min was spent on advance care planning. This discussion occurred on a fully voluntary basis with the verbal consent of the patient and/or family.     (See photo in [employer])  Health Maintenance:  Health Maintenance   Topic Date Due    RSV Vaccine (Age 60+ and Pregnant patients) (1 - 1-dose 75+ series) Never done    Shingles Vaccine (2 of 2) 02/07/2025    DEXA Scan  12/27/2026    Lipid Panel  01/06/2030    TETANUS VACCINE  09/12/2032    Influenza Vaccine  Completed    COVID-19 Vaccine  Completed    Pneumococcal Vaccines (Age 50+)  Completed       Past Medical History:   Diagnosis Date    Atrial fibrillation     Cellulitis     Hypertension     Thyroid disease        Past Surgical History:   Procedure Laterality Date    APPENDECTOMY      HIP FRACTURE SURGERY  2024    HYSTERECTOMY, VAGINAL, WITH SALPINGO-OOPHORECTOMY      KNEE ARTHROSCOPY Bilateral     MULTIPLE TOOTH EXTRACTIONS      TONSILLECTOMY         Family History   Problem Relation Name Age of Onset    Cancer Mother  66        Liver cancer    Hypertension Father      Stroke Father  59       Social History     Socioeconomic History    Marital status:    Tobacco Use    Smoking status: Never     Passive exposure: Never    Smokeless tobacco: Never   Substance and Sexual Activity    Alcohol use: Yes     Comment: Occasional    Drug use: Never    Sexual activity: Not Currently       Current Outpatient Medications   Medication Sig Dispense Refill    gabapentin (NEURONTIN) 300 MG capsule Take 1 capsule (300 mg total) by mouth every evening. 90 capsule 3    levothyroxine (SYNTHROID) 75 MCG tablet TAKE 1 TABLET BY MOUTH BEFORE BREAKFAST 90 tablet 0    metoprolol succinate (TOPROL-XL) 100 MG 24 hr tablet Take 1 tablet by mouth twice daily 180 tablet 0    valsartan (DIOVAN) 320 MG tablet Take 1 tablet (320 mg total) by mouth once daily. 90 tablet 3    vitamin D (VITAMIN D3) 1000 units Tab Take 2,000 Units by mouth once daily.      warfarin (COUMADIN) 5 MG  "tablet Take 1 tablet (5 mg total) by mouth Daily. 90 tablet 0    amLODIPine (NORVASC) 5 MG tablet Take 1 tablet (5 mg total) by mouth once daily. (Patient not taking: Reported on 1/13/2025) 30 tablet 3    calcium carbonate (CALCIUM 500) 500 mg calcium (1,250 mg) chewable tablet Take 1 tablet (500 mg total) by mouth 2 (two) times daily. 60 tablet 11    erythromycin (ROMYCIN) ophthalmic ointment Place into the right eye every 8 (eight) hours. 3.5 g 0    hydrALAZINE (APRESOLINE) 50 MG tablet Take 1 tablet (50 mg total) by mouth Daily.       No current facility-administered medications for this visit.       Review of patient's allergies indicates:  No Known Allergies      Objective:       Last 3 sets of Vitals        8/15/2024     5:28 PM 11/4/2024     1:23 PM 1/13/2025    11:07 AM   Vitals - 1 value per visit   SYSTOLIC 132 138 110   DIASTOLIC 80 78 70   Pulse  89 73   SPO2  96 % 96 %   Weight (lb)  153.88 156.64   Weight (kg)  69.8 71.05   Height   5' 6" (1.676 m)   BMI (Calculated)   25.3   Pain Score  Zero Zero   Physical Exam  Constitutional:       General: She is not in acute distress.     Appearance: Normal appearance.   HENT:      Head: Normocephalic.      Mouth/Throat:      Comments: Mild dry mucosa  Eyes:      General: No scleral icterus.     Extraocular Movements: Extraocular movements intact.      Conjunctiva/sclera: Conjunctivae normal.      Comments: Lower eyelid with small papule, no discharge, slight erythema.   Neck:      Vascular: No carotid bruit.      Comments: No goiter.  Cardiovascular:      Rate and Rhythm: Normal rate and regular rhythm.      Pulses: Normal pulses.      Heart sounds: Normal heart sounds.   Pulmonary:      Effort: Pulmonary effort is normal.      Breath sounds: Normal breath sounds.   Abdominal:      General: Bowel sounds are normal. There is no distension.      Palpations: Abdomen is soft. There is no mass.      Tenderness: There is no abdominal tenderness.   Musculoskeletal:    "      General: No swelling. Normal range of motion.   Lymphadenopathy:      Cervical: No cervical adenopathy.   Skin:     General: Skin is warm and dry.   Neurological:      General: No focal deficit present.      Mental Status: She is alert and oriented to person, place, and time.      Comments: Stands up without loss of balance, using cane to ambulate   Psychiatric:         Mood and Affect: Mood normal.         Behavior: Behavior normal.           CBC:  Recent Labs   Lab 08/13/24  0654 01/06/25  0832   WBC 6.53 7.12   RBC 3.71 L 3.84 L   Hemoglobin 11.4 L 11.7 L   Hematocrit 34.2 L 36.4 L   Platelets 253 262   MCV 92 95   MCH 30.7 30.5   MCHC 33.3 32.1     CMP:  Recent Labs   Lab 08/13/24  0654 01/06/25  0832   Glucose 92 89   Calcium 9.9 9.7   Albumin 3.9 4.0   Total Protein 7.1 7.7   Sodium 138 137   Potassium 4.7 4.7   CO2 22 L 22 L   Chloride 105 105   BUN 24 H 27 H   Creatinine 1.4 1.4   Alkaline Phosphatase 75 83   ALT 12 12   AST 17 19   Total Bilirubin 0.6 0.5     URINALYSIS:       LIPIDS:  Recent Labs   Lab 08/13/24  0654 08/13/24  0716 01/06/25  0832   TSH 1.576  --  1.643   HDL  --  48 48   Cholesterol  --  237 H 243 H   Triglycerides  --  172 H 140   LDL Cholesterol  --  154.6 167.0 H   HDL/Cholesterol Ratio  --  20.3 19.8 L   Non-HDL Cholesterol  --  189 195   Total Cholesterol/HDL Ratio  --  4.9 5.1 H     TSH:  Recent Labs   Lab 08/13/24  0654 01/06/25  0832   TSH 1.576 1.643       A1C:  Recent Labs   Lab 08/13/24  0716   Hemoglobin A1C 5.4       Imaging:  DXA Bone Density Axial Skeleton 1 or more sites  Narrative: EXAMINATION:  DXA BONE DENSITY AXIAL SKELETON 1 OR MORE SITES    CLINICAL HISTORY:  Asymptomatic menopausal state.  Patient reported history of left hip fracture in 2024. No reported treatments for osteoporosis.    TECHNIQUE:  DXA specification: [facility] (S/N [id number])    Bone Mineral Density scanning was performed over the hip and lumbar spine.    Review of the images  confirms satisfactory positioning and technique.    COMPARISON:  No prior comparison available    FINDINGS:  Lumbar spine (L1-L4):               T-score is +0.9, and Z-score is +3.7.    Degenerative changes at the lumbar spine are noted, which may falsely elevate bone density in this region.    Femoral neck:                          T-score is -3.0, and Z-score is -0.4.    Total hip:                                T-score is -2.7, and Z-score is -0.3.    Fracture Risk (FRAX)    26% risk of a major osteoporotic fracture in the next 10 years.    9.5% risk of hip fracture in the next 10 years.  Impression: *Severe osteoporosis based on T-score below -2.5 with fragility fracture  *Fracture risk is very high due to recent fracture and T-score below -3.0.    RECOMMENDATIONS:  *Daily calcium intake 0390-3637 mg, dietary sources preferred; Vitamin D 0027-0135 IU daily.  *Weight bearing exercise and fall precautions.  *Given very high fracture risk, would consider anabolic agents (including teriparatide, abaloparatide, or romosozumab), denosumab or zoledronic acid as the preferred treatment options. Oral bisphosphonates can be considered as second-line therapy.  *Repeat BMD in 2 years.    Electronically signed by: Fuad Johnson  Date:    12/31/2024  Time:    09:45      Assessment:       1. Primary hypertension    2. Stage 3b chronic kidney disease    3. Hyperlipidemia, unspecified hyperlipidemia type    4. Statin myopathy    5. Paroxysmal atrial fibrillation    6. Hypothyroidism, unspecified type    7. Age-related osteoporosis with current pathological fracture, sequela    8. Closed displaced intertrochanteric fracture of left femur with routine healing    9. Healthcare maintenance            Plan:       1. Primary hypertension  Overview:  Changes Valsartan- HCTZ 320-25 mg daily to Valsartan 320mg and amlodipine 5mg daily, and continue Hydralazine 50 mg daily.    Assessment & Plan:  - Discontinued hydrochlorothiazide due to  concerns about dehydration and worsening kidney function (BUN trending up)  - Switched from combination blood pressure medication to single agent valsartan and added amlodipine  - Continued hydralazine 50 mg daily, Valsartan, and amlodipine.  - Instructed patient to record time of blood pressure measurement.  - Scheduled follow-up after starting new medications to assess efficacy.    Orders:  -     hydrALAZINE (APRESOLINE) 50 MG tablet; Take 1 tablet (50 mg total) by mouth Daily.    2. Stage 3b chronic kidney disease  Overview:  On valsartan 320mg day    Assessment & Plan:  - Adjusted medications: discontinued hydrochlorothiazide (diuretic) to prevent dehydration, switched to Valsartan, and added amlodipine for blood pressure control.  - Instructed patient to monitor blood pressure daily.  - Educated patient on maintaining adequate fluid intake for kidney function.  - Ordered labs for kidney function on February 3rd.        3. Hyperlipidemia, unspecified hyperlipidemia type  Overview:  Reports pain with statins. Wants conservative management.    Assessment & Plan:  - Discontinued Lipitor (statin).  - Recommend dietary changes to manage cholesterol: increase fruits and vegetables, limit red meat and seafood, reduce carbohydrates and sweets, choose egg whites over whole eggs.  - Advised using fruit as snacks.  - Will monitor cholesterol levels.      4. Statin myopathy  Overview:  Reports pain with statins. Wants conservative management.      5. Paroxysmal atrial fibrillation  Overview:  On Coumadin 5 mg day    Assessment & Plan:  Followed by Pharmacy Warfarin clinic.  Denies bleeding.  Has labs scheduled.  Last INR 2.3.      6. Hypothyroidism, unspecified type  Overview:  On levothyroxine 75 mcg daily    Assessment & Plan:  - Clinically euthyroid.    - Labs satisfactory  - Continue to monitor labs with same strength.      7. Age-related osteoporosis with current pathological fracture, sequela  Overview:  DEXA in  12/31/24-  Impression: *Severe osteoporosis based on T-score below -2.5 with fragility fracture  *Fracture risk is very high due to recent fracture and T-score below -3.0.    Assessment & Plan:  - Explained osteoporosis treatment options, including risks and benefits of various medications.  - Increased vitamin D3 dosage to 2000 IU daily.  - Submitted electronic consult to endocrinology for management of severe osteoporosis.  - Considering Prolia injections every 6 months for stronger treatment. Does not want daily or monthly injections.  - Ordered labs for February 3rd: calcium and PTHi hormone levels related to bone health.  - Will check calcium levels before starting medication and considering calcium supplementation (possibly Tums 500mg twice daily).      Orders:  -     Vitamin D; Future; Expected date: 01/13/2025  -     PTH, intact; Future; Expected date: 01/13/2025  -     calcium carbonate (CALCIUM 500) 500 mg calcium (1,250 mg) chewable tablet; Take 1 tablet (500 mg total) by mouth 2 (two) times daily.  Dispense: 60 tablet; Refill: 11  -     E-Consult to Endocrinology    8. Closed displaced intertrochanteric fracture of left femur with routine healing  Overview:  S/P cephalo-medullary nail to the left hip    Assessment & Plan:  - Recovering well from surgery.    - Noted patient uses a cane for mobility.  - Recommend considering a rollator if feeling unsteady.  - Advised on fall prevention strategies, including installing grab bars in bathrooms, wearing non-slip shoes, and proper use of assistive de  vices as needed. Lives at assisted living.        9. Healthcare maintenance  Assessment & Plan:  - Yearly labs- 1/6/25  - Colon cancer screening- not indicated at her age  - ACP- Reports she brought [facility] forms but not uploaded yet.   - DEXA- ordered  - Vaccination- reports from [facility] will be reviewed. Reports due for Pneumonia vaccine and Shingels.      Other orders  -     erythromycin (ROMYCIN) ophthalmic  ointment; Place into the right eye every 8 (eight) hours.  Dispense: 3.5 g; Refill: 0       SUSPECTED STYE:  - Instructed patient to apply warm compresses to the affected eye and continue this treatment.  - Prescribed erythromycin ophthalmic ointment to be applied to the affected eye 3 times daily for 8 hours.  - Advised patient to contact the office if the eye condition worsens or does not improve with prescribed treatment.        Health Maintenance Due   Topic Date Due    RSV Vaccine (Age 60+ and Pregnant patients) (1 - 1-dose 75+ series) Never done        I spent a total of 44 minutes on the day of the visit.This includes face to face time and non-face to face time preparing to see the patient (eg, review of tests), obtaining and/or reviewing separately obtained history, documenting clinical information in the electronic or other health record, independently interpreting results and communicating results to the patient/family/caregiver, or care coordinator.     RETURN TO CLINIC IN: 2-3 WEEKS    FOR NEXT VISIT: BLOOD PRESSURE MONITORING, REVIEW LABS, MEDICATION MONITORING, and CARE GAPS     Rosy Rivera MD  [facility]  Disclaimer:  This note has been generated using voice-recognition software. There may be grammatical or spelling errors that have been missed during proof-reading

## 2025-01-14 ENCOUNTER — E-CONSULT (OUTPATIENT)
Dept: ENDOCRINOLOGY | Facility: CLINIC | Age: 89
End: 2025-01-14
Payer: MEDICARE

## 2025-01-14 DIAGNOSIS — M80.00XS AGE-RELATED OSTEOPOROSIS WITH CURRENT PATHOLOGICAL FRACTURE, SEQUELA: Primary | ICD-10-CM

## 2025-01-14 PROBLEM — M80.00XA AGE-RELATED OSTEOPOROSIS WITH CURRENT PATHOLOGICAL FRACTURE: Status: ACTIVE | Noted: 2025-01-14

## 2025-01-14 PROBLEM — G72.0 STATIN MYOPATHY: Status: ACTIVE | Noted: 2025-01-14

## 2025-01-14 PROBLEM — T46.6X5A STATIN MYOPATHY: Status: ACTIVE | Noted: 2025-01-14

## 2025-01-14 PROCEDURE — 99451 NTRPROF PH1/NTRNET/EHR 5/>: CPT | Mod: S$PBB,,, | Performed by: STUDENT IN AN ORGANIZED HEALTH CARE EDUCATION/TRAINING PROGRAM

## 2025-01-14 RX ORDER — HYDRALAZINE HYDROCHLORIDE 50 MG/1
50 TABLET, FILM COATED ORAL DAILY
Start: 2025-01-14 | End: 2025-01-24

## 2025-01-14 NOTE — ASSESSMENT & PLAN NOTE
- Discontinued Lipitor (statin).  - Recommend dietary changes to manage cholesterol: increase fruits and vegetables, limit red meat and seafood, reduce carbohydrates and sweets, choose egg whites over whole eggs.  - Advised using fruit as snacks.  - Will monitor cholesterol levels.

## 2025-01-14 NOTE — ASSESSMENT & PLAN NOTE
- Recovering well from surgery.    - Noted patient uses a cane for mobility.  - Recommend considering a rollator if feeling unsteady.  - Advised on fall prevention strategies, including installing grab bars in bathrooms, wearing non-slip shoes, and proper use of assistive de  vices as needed. Lives at assisted living.

## 2025-01-14 NOTE — ASSESSMENT & PLAN NOTE
- Yearly labs- 1/6/25  - Colon cancer screening- not indicated at her age  - ACP- Reports she brought [facility] forms but not uploaded yet.   - DEXA- ordered  - Vaccination- reports from [facility] will be reviewed. Reports due for Pneumonia vaccine and Shingels.

## 2025-01-14 NOTE — CONSULTS
[facility]  Response for E-Consult     Patient Name: Sola Pitt  MRN: [id number]  Primary Care Provider: Rosy Rivera MD   Requesting Provider: Rosy Rivera MD  E-Consult to Endocrinology  Consult performed by: Syed Christine DO  Consult ordered by: Rosy Rivera MD  Reason for consult: Osteoporosis  Assessment/Recommendations: See note      For your 89-year-old female patient with osteoporosis (T-score -3.0), elevated FRAX (26% major osteoporotic, 9.5% hip), and a history of left hip fragility fracture, she meets criteria for severe osteoporosis. We would recommend starting anabolic therapy to improve bone density and reduce fracture risk.    Therapy Options:    Forteo (teriparatide) or Tymlos (abaloparatide) can be ordered through the [facility].  Contraindications: Skeletal radiation history, Pagets disease, or risk factors for osteosarcoma.    Evenity (romosozumab) can be ordered through the [facility].  Contraindications: History of stroke or myocardial infarction within the past 12 months.    Additional Considerations:  No clear secondary cause of osteoporosis is noted on chart review, though Coumadin use may contribute to fracture risk.  Continue vitamin D supplementation to maintain normal levels and ensure adequate calcium intake through diet or supplements.    Post-Anabolic Therapy Options (critical to maintain gains):  Reclast (zoledronic acid), Prolia (denosumab), or Forteo. Choice depends on renal function at that time.    Monitoring:  DXA scans should be repeated every 2 years.      Reach out if questions.      Total time of Consultation: 10 minute    I did not speak to the requesting provider verbally about this.     *This eConsult is based on the clinical data available to me and is furnished without benefit of a physical examination. The eConsult will need to be interpreted in light of any clinical issues or changes in patient status not  available to me at the time of filing this eConsults. Significant changes in patient condition or level of acuity should result in immediate formal consultation and reevaluation. Please alert me if you have further questions.    Thank you for this eConsult referral.     DO Reji Mas - [facility]

## 2025-01-14 NOTE — ASSESSMENT & PLAN NOTE
- Adjusted medications: discontinued hydrochlorothiazide (diuretic) to prevent dehydration, switched to Valsartan, and added amlodipine for blood pressure control.  - Instructed patient to monitor blood pressure daily.  - Educated patient on maintaining adequate fluid intake for kidney function.  - Ordered labs for kidney function on February 3rd.

## 2025-01-14 NOTE — ASSESSMENT & PLAN NOTE
- Explained osteoporosis treatment options, including risks and benefits of various medications.  - Increased vitamin D3 dosage to 2000 IU daily.  - Submitted electronic consult to endocrinology for management of severe osteoporosis.  - Considering Prolia injections every 6 months for stronger treatment. Does not want daily or monthly injections.  - Ordered labs for February 3rd: calcium and PTHi hormone levels related to bone health.  - Will check calcium levels before starting medication and considering calcium supplementation (possibly Tums 500mg twice daily).

## 2025-01-14 NOTE — ASSESSMENT & PLAN NOTE
- Discontinued hydrochlorothiazide due to concerns about dehydration and worsening kidney function (BUN trending up)  - Switched from combination blood pressure medication to single agent valsartan and added amlodipine  - Continued hydralazine 50 mg daily, Valsartan, and amlodipine.  - Instructed patient to record time of blood pressure measurement.  - Scheduled follow-up after starting new medications to assess efficacy.

## 2025-01-24 ENCOUNTER — OFFICE VISIT (OUTPATIENT)
Dept: PRIMARY CARE CLINIC | Facility: CLINIC | Age: 89
End: 2025-01-24
Payer: MEDICARE

## 2025-01-24 VITALS
BODY MASS INDEX: 25.3 KG/M2 | HEIGHT: 66 IN | SYSTOLIC BLOOD PRESSURE: 134 MMHG | OXYGEN SATURATION: 96 % | DIASTOLIC BLOOD PRESSURE: 88 MMHG | HEART RATE: 93 BPM | WEIGHT: 157.44 LBS

## 2025-01-24 DIAGNOSIS — I48.0 PAROXYSMAL ATRIAL FIBRILLATION: ICD-10-CM

## 2025-01-24 DIAGNOSIS — H00.12 CHALAZION OF RIGHT LOWER EYELID: ICD-10-CM

## 2025-01-24 DIAGNOSIS — M80.00XS AGE-RELATED OSTEOPOROSIS WITH CURRENT PATHOLOGICAL FRACTURE, SEQUELA: ICD-10-CM

## 2025-01-24 DIAGNOSIS — I10 PRIMARY HYPERTENSION: ICD-10-CM

## 2025-01-24 DIAGNOSIS — N18.32 STAGE 3B CHRONIC KIDNEY DISEASE: ICD-10-CM

## 2025-01-24 DIAGNOSIS — G62.9 NEUROPATHY: ICD-10-CM

## 2025-01-24 DIAGNOSIS — R22.43 LOCALIZED SWELLING OF BOTH LOWER LEGS: Primary | ICD-10-CM

## 2025-01-24 PROCEDURE — 99215 OFFICE O/P EST HI 40 MIN: CPT | Mod: PBBFAC,PN | Performed by: INTERNAL MEDICINE

## 2025-01-24 PROCEDURE — 99215 OFFICE O/P EST HI 40 MIN: CPT | Mod: S$PBB,,, | Performed by: INTERNAL MEDICINE

## 2025-01-24 PROCEDURE — 99999 PR PBB SHADOW E&M-EST. PATIENT-LVL V: CPT | Mod: PBBFAC,,, | Performed by: INTERNAL MEDICINE

## 2025-01-24 RX ORDER — GABAPENTIN 100 MG/1
100 CAPSULE ORAL NIGHTLY
Qty: 90 CAPSULE | Refills: 3 | Status: SHIPPED | OUTPATIENT
Start: 2025-01-24

## 2025-01-24 RX ORDER — HYDRALAZINE HYDROCHLORIDE 50 MG/1
50 TABLET, FILM COATED ORAL EVERY 12 HOURS
Start: 2025-01-24

## 2025-01-24 NOTE — PROGRESS NOTES
"Subjective:       Patient ID: Sola Pitt is a 89 y.o. female.    Chief Complaint: Edema (Ankle and hands)      HPI  Sola Pitt is a 89 y.o. female with  hypertension, PAF, PVD, neuropathy, CKD 3, hypothyroid who presents today for Edema (Ankle and hands)    Started noticing increasing feet and leg swelling.  Denies pain in legs, chest pains, or shortness a breath.  Notices mild redness of the legs and a rash.  Occasional itching, no pain.  Uses moisturizer for dry skin.  Started noticing increased leg swelling when medication was changed to amlodipine.  Now remembers that in the past she tried amlodipine and cause leg swelling and advised not to use it in the future.    Blood pressure log looks good.  Blood pressure has been controlled.  Does feel with decreased energy and "blah", maybe down since started the amlodipine.  She used to be on hydralazine twice a day but was taking it daily with good blood pressures before her medication was changed.  She remains off the HCTZ which was held after decrease kidney function.    She is on gabapentin 300 mg at night for history of neuropathy.  Reports the medication helped and occasionally will notice tingling of her feet.  Will like to try lower strength of the medication.    We discuss eConsult recommendations for osteoporosis.  Daily or monthly anabolic injections recommended to later transition to Reclast or Prolia.  She is not interested in anabolic injections and will like to try Reclast or Prolia.  Due to her kidney function, Prolia would be favored.  Has labs scheduled within 2 weeks.    Right eye swelling is improving, no discharge or pain.      Health Maintenance:  Health Maintenance   Topic Date Due    RSV Vaccine (Age 60+ and Pregnant patients) (1 - 1-dose 75+ series) Never done    Shingles Vaccine (2 of 2) 02/07/2025    DEXA Scan  12/27/2026    Lipid Panel  01/06/2030    TETANUS VACCINE  09/12/2032    Influenza Vaccine  Completed    COVID-19 Vaccine  " Completed    Pneumococcal Vaccines (Age 50+)  Completed       Review of Systems   Respiratory: Negative.     Cardiovascular: Negative.    Gastrointestinal: Negative.    Musculoskeletal:  Positive for leg pain.   Integumentary:  Positive for rash.   Neurological:         Occasional tingling of the feet.   Psychiatric/Behavioral:  Positive for dysphoric mood (mild anhedonia and does not feel she needs to talk to someone).       Past Medical History:   Diagnosis Date    Atrial fibrillation     Cellulitis     Hypertension     Thyroid disease        Past Surgical History:   Procedure Laterality Date    APPENDECTOMY      HIP FRACTURE SURGERY  2024    HYSTERECTOMY, VAGINAL, WITH SALPINGO-OOPHORECTOMY      KNEE ARTHROSCOPY Bilateral     MULTIPLE TOOTH EXTRACTIONS      TONSILLECTOMY         Family History   Problem Relation Name Age of Onset    Cancer Mother  66        Liver cancer    Hypertension Father      Stroke Father  59       Social History     Socioeconomic History    Marital status:    Tobacco Use    Smoking status: Never     Passive exposure: Never    Smokeless tobacco: Never   Substance and Sexual Activity    Alcohol use: Yes     Comment: Occasional    Drug use: Never    Sexual activity: Not Currently       Current Outpatient Medications   Medication Sig Dispense Refill    erythromycin (ROMYCIN) ophthalmic ointment Place into the right eye every 8 (eight) hours. 3.5 g 0    levothyroxine (SYNTHROID) 75 MCG tablet TAKE 1 TABLET BY MOUTH BEFORE BREAKFAST 90 tablet 0    metoprolol succinate (TOPROL-XL) 100 MG 24 hr tablet Take 1 tablet by mouth twice daily 180 tablet 0    valsartan (DIOVAN) 320 MG tablet Take 1 tablet (320 mg total) by mouth once daily. 90 tablet 3    vitamin D (VITAMIN D3) 1000 units Tab Take 2,000 Units by mouth once daily.      warfarin (COUMADIN) 5 MG tablet Take 1 tablet (5 mg total) by mouth Daily. 90 tablet 0    calcium carbonate (CALCIUM 500) 500 mg calcium (1,250 mg) chewable tablet  "Take 1 tablet (500 mg total) by mouth 2 (two) times daily. (Patient not taking: Reported on 1/24/2025) 60 tablet 11    gabapentin (NEURONTIN) 100 MG capsule Take 1 capsule (100 mg total) by mouth every evening. 90 capsule 3    hydrALAZINE (APRESOLINE) 50 MG tablet Take 1 tablet (50 mg total) by mouth every 12 (twelve) hours.       No current facility-administered medications for this visit.       Review of patient's allergies indicates:   Allergen Reactions    Amlodipine Other (See Comments)     Leg swelling         Objective:       Last 3 sets of Vitals        11/4/2024     1:23 PM 1/13/2025    11:07 AM 1/24/2025     2:29 PM   Vitals - 1 value per visit   SYSTOLIC 138 110 134   DIASTOLIC 78 70 88   Pulse 89 73 93   SPO2 96 % 96 % 96 %   Weight (lb) 153.88 156.64 157.41   Weight (kg) 69.8 71.05 71.4   Height  5' 6" (1.676 m) 5' 6" (1.676 m)   BMI (Calculated)  25.3 25.4   Pain Score Zero Zero Zero   Physical Exam  Constitutional:       General: She is not in acute distress.     Appearance: Normal appearance.   Eyes:      General: No scleral icterus.        Right eye: No discharge.         Left eye: No discharge.      Extraocular Movements: Extraocular movements intact.      Conjunctiva/sclera: Conjunctivae normal.      Comments: Small papule formation in right lower lid with less swelling than previous.   Neck:      Comments: No goiter.  Cardiovascular:      Rate and Rhythm: Normal rate and regular rhythm.      Pulses: Normal pulses.      Heart sounds: Normal heart sounds.   Pulmonary:      Effort: Pulmonary effort is normal.      Breath sounds: Normal breath sounds.   Abdominal:      General: Bowel sounds are normal. There is no distension.      Palpations: Abdomen is soft.   Musculoskeletal:         General: Swelling present. No tenderness. Normal range of motion.      Comments: Negative Homans.   Lymphadenopathy:      Cervical: No cervical adenopathy.   Skin:     General: Skin is warm and dry.      Findings: Rash " (Dry skin, small scattered papules in the distal lower extremity and dorsal side of the foot) present.   Neurological:      General: No focal deficit present.      Mental Status: She is alert and oriented to person, place, and time.   Psychiatric:         Mood and Affect: Mood normal.         Behavior: Behavior normal.           CBC:  Recent Labs   Lab 08/13/24  0654 01/06/25  0832   WBC 6.53 7.12   RBC 3.71 L 3.84 L   Hemoglobin 11.4 L 11.7 L   Hematocrit 34.2 L 36.4 L   Platelets 253 262   MCV 92 95   MCH 30.7 30.5   MCHC 33.3 32.1     CMP:  Recent Labs   Lab 08/13/24  0654 01/06/25  0832   Glucose 92 89   Calcium 9.9 9.7   Albumin 3.9 4.0   Total Protein 7.1 7.7   Sodium 138 137   Potassium 4.7 4.7   CO2 22 L 22 L   Chloride 105 105   BUN 24 H 27 H   Creatinine 1.4 1.4   Alkaline Phosphatase 75 83   ALT 12 12   AST 17 19   Total Bilirubin 0.6 0.5     URINALYSIS:       LIPIDS:  Recent Labs   Lab 08/13/24  0654 08/13/24  0716 01/06/25  0832   TSH 1.576  --  1.643   HDL  --  48 48   Cholesterol  --  237 H 243 H   Triglycerides  --  172 H 140   LDL Cholesterol  --  154.6 167.0 H   HDL/Cholesterol Ratio  --  20.3 19.8 L   Non-HDL Cholesterol  --  189 195   Total Cholesterol/HDL Ratio  --  4.9 5.1 H     TSH:  Recent Labs   Lab 08/13/24  0654 01/06/25  0832   TSH 1.576 1.643       A1C:  Recent Labs   Lab 08/13/24  0716   Hemoglobin A1C 5.4       Imaging:  DXA Bone Density Axial Skeleton 1 or more sites  Narrative: EXAMINATION:  DXA BONE DENSITY AXIAL SKELETON 1 OR MORE SITES    CLINICAL HISTORY:  Asymptomatic menopausal state.  Patient reported history of left hip fracture in 2024. No reported treatments for osteoporosis.    TECHNIQUE:  DXA specification: [facility] (S/N [id number])    Bone Mineral Density scanning was performed over the hip and lumbar spine.    Review of the images confirms satisfactory positioning and technique.    COMPARISON:  No prior comparison available    FINDINGS:  Lumbar spine  (L1-L4):               T-score is +0.9, and Z-score is +3.7.    Degenerative changes at the lumbar spine are noted, which may falsely elevate bone density in this region.    Femoral neck:                          T-score is -3.0, and Z-score is -0.4.    Total hip:                                T-score is -2.7, and Z-score is -0.3.    Fracture Risk (FRAX)    26% risk of a major osteoporotic fracture in the next 10 years.    9.5% risk of hip fracture in the next 10 years.  Impression: *Severe osteoporosis based on T-score below -2.5 with fragility fracture  *Fracture risk is very high due to recent fracture and T-score below -3.0.    RECOMMENDATIONS:  *Daily calcium intake 8973-1234 mg, dietary sources preferred; Vitamin D 9345-0762 IU daily.  *Weight bearing exercise and fall precautions.  *Given very high fracture risk, would consider anabolic agents (including teriparatide, abaloparatide, or romosozumab), denosumab or zoledronic acid as the preferred treatment options. Oral bisphosphonates can be considered as second-line therapy.  *Repeat BMD in 2 years.    Electronically signed by: Fuad Johnson  Date:    12/31/2024  Time:    09:45      Assessment:       1. Localized swelling of both lower legs    2. Primary hypertension    3. Stage 3b chronic kidney disease    4. Paroxysmal atrial fibrillation    5. Neuropathy    6. Chalazion of right lower eyelid    7. Age-related osteoporosis with current pathological fracture, sequela            Plan:       1. Localized swelling of both lower legs  Assessment & Plan:  Noted increased leg swelling with amlodipine.  Will discontinue amlodipine and decrease gabapentin, which can also contribute to leg swelling.  Rash likely due to edema with amlodipine.  Okay to use cortisone as needed for itching.  Compression stockings if tolerated will help.  Keep legs elevated.  INR has been therapeutic and has no other symptoms of DVT.  Re-evaluate on next visit for possibly add diuretic  as needed.      2. Primary hypertension  Overview:  Changed Valsartan- HCTZ 320-25 mg daily to Valsartan 320mg and amlodipine 5mg daily but develop leg swelling.  Hydralazine 50 mg daily, will be increased to twice a day.      Assessment & Plan:  - Discontinued hydrochlorothiazide due to concerns about dehydration and worsening kidney function (BUN trending up)  - Switched from combination blood pressure medication to single agent valsartan.  - had good blood pressure control with amlodipine but did not tolerate it due to leg swelling, which happened in the past.  Amlodipine was discontinued and added to list of allergies due to side effect.  - will increase hydralazine 50 mg 2 twice a day and continue Valsartan 320 mg day.  - Instructed patient to record time of blood pressure measurement.  - Scheduled follow-up after starting new medications to assess efficacy.    Orders:  -     hydrALAZINE (APRESOLINE) 50 MG tablet; Take 1 tablet (50 mg total) by mouth every 12 (twelve) hours.    3. Stage 3b chronic kidney disease  Overview:  On valsartan 320mg day    Assessment & Plan:  - Adjusted medications: discontinued hydrochlorothiazide (diuretic) to prevent dehydration, switched to Valsartan with hydralazine for blood pressure control.  - Instructed patient to monitor blood pressure daily.  - follow-up leg swelling as she may need to restart a diuretic.  - Ordered labs for kidney function on February 3rd.        4. Paroxysmal atrial fibrillation  Overview:  On Coumadin 5 mg day    Assessment & Plan:  Stable heart rate and blood pressure.  Followed by Pharmacy Warfarin clinic.  Denies bleeding.  Has labs scheduled.  Last INR 2.3.      5. Neuropathy  Overview:  On gabapentin 300 mg nightly and will be decreased to 100 mg nightly to deescalate and helps swelling.    Assessment & Plan:  Reports the medication was started for neuropathy of her feet.  Feels the medication was helping and only has occasional tingling.   Interested in decreasing strength of the medication.      6. Chalazion of right lower eyelid  Comments:  Right eyelid improving but persist with papule on lower eyelid.  Will refer to Ophthalmology.  Orders:  -     Cancel: Ambulatory referral/consult to Ophthalmology; Future; Expected date: 01/24/2025  -     Ambulatory referral/consult to Ophthalmology; Future; Expected date: 01/24/2025    7. Age-related osteoporosis with current pathological fracture, sequela  Overview:  DEXA in 12/31/24-  Impression: *Severe osteoporosis based on T-score below -2.5 with fragility fracture  *Fracture risk is very high due to recent fracture and T-score below -3.0.    Assessment & Plan:  - On vitamin D3 dosage to 2000 IU daily.  - Electronic consult to endocrinology recommending anabolic treatment but patient not interested in monthly or daily injection.  She is aware of recommendations and at least agrees to try Prolia or Reclast if kidney function allows it.  - Ordered labs for February 3rd: calcium and PTHi hormone levels related to bone health.  - Will check calcium levels before starting medication and considering calcium supplementation.      Other orders  -     gabapentin (NEURONTIN) 100 MG capsule; Take 1 capsule (100 mg total) by mouth every evening.  Dispense: 90 capsule; Refill: 3       Health Maintenance Due   Topic Date Due    RSV Vaccine (Age 60+ and Pregnant patients) (1 - 1-dose 75+ series) Never done        I spent a total of 40 minutes on the day of the visit.This includes face to face time and non-face to face time preparing to see the patient (eg, review of tests), obtaining and/or reviewing separately obtained history, documenting clinical information in the electronic or other health record, independently interpreting results and communicating results to the patient/family/caregiver, or care coordinator.     RETURN TO CLINIC IN:  As scheduled.    FOR NEXT VISIT: BLOOD PRESSURE MONITORING, REVIEW LABS, and  MEDICATION MONITORING     Rosy Rivera MD  [facility]  Disclaimer:  This note has been generated using voice-recognition software. There may be grammatical or spelling errors that have been missed during proof-reading

## 2025-01-24 NOTE — ASSESSMENT & PLAN NOTE
- Discontinued hydrochlorothiazide due to concerns about dehydration and worsening kidney function (BUN trending up)  - Switched from combination blood pressure medication to single agent valsartan.  - had good blood pressure control with amlodipine but did not tolerate it due to leg swelling, which happened in the past.  Amlodipine was discontinued and added to list of allergies due to side effect.  - will increase hydralazine 50 mg 2 twice a day and continue Valsartan 320 mg day.  - Instructed patient to record time of blood pressure measurement.  - Scheduled follow-up after starting new medications to assess efficacy.

## 2025-01-24 NOTE — ASSESSMENT & PLAN NOTE
Stable heart rate and blood pressure.  Followed by Pharmacy Warfarin clinic.  Denies bleeding.  Has labs scheduled.  Last INR 2.3.

## 2025-01-24 NOTE — ASSESSMENT & PLAN NOTE
Reports the medication was started for neuropathy of her feet.  Feels the medication was helping and only has occasional tingling.  Interested in decreasing strength of the medication.

## 2025-01-24 NOTE — ASSESSMENT & PLAN NOTE
Noted increased leg swelling with amlodipine.  Will discontinue amlodipine and decrease gabapentin, which can also contribute to leg swelling.  Rash likely due to edema with amlodipine.  Okay to use cortisone as needed for itching.  Compression stockings if tolerated will help.  Keep legs elevated.  INR has been therapeutic and has no other symptoms of DVT.  Re-evaluate on next visit for possibly add diuretic as needed.

## 2025-01-24 NOTE — ASSESSMENT & PLAN NOTE
- On vitamin D3 dosage to 2000 IU daily.  - Electronic consult to endocrinology recommending anabolic treatment but patient not interested in monthly or daily injection.  She is aware of recommendations and at least agrees to try Prolia or Reclast if kidney function allows it.  - Ordered labs for February 3rd: calcium and PTHi hormone levels related to bone health.  - Will check calcium levels before starting medication and considering calcium supplementation.

## 2025-01-24 NOTE — ASSESSMENT & PLAN NOTE
- Adjusted medications: discontinued hydrochlorothiazide (diuretic) to prevent dehydration, switched to Valsartan with hydralazine for blood pressure control.  - Instructed patient to monitor blood pressure daily.  - follow-up leg swelling as she may need to restart a diuretic.  - Ordered labs for kidney function on February 3rd.

## 2025-01-29 ENCOUNTER — HOSPITAL ENCOUNTER (INPATIENT)
Facility: HOSPITAL | Age: 89
LOS: 2 days | Discharge: HOME | DRG: 291 | End: 2025-01-31
Attending: EMERGENCY MEDICINE | Admitting: HOSPITALIST
Payer: MEDICARE

## 2025-01-29 DIAGNOSIS — J10.1 INFLUENZA A: Primary | ICD-10-CM

## 2025-01-29 DIAGNOSIS — I50.9 ACUTE ON CHRONIC CONGESTIVE HEART FAILURE, UNSPECIFIED HEART FAILURE TYPE: ICD-10-CM

## 2025-01-29 DIAGNOSIS — I50.9 HEART FAILURE: ICD-10-CM

## 2025-01-29 DIAGNOSIS — R06.02 SOB (SHORTNESS OF BREATH): ICD-10-CM

## 2025-01-29 LAB
ALBUMIN SERPL BCP-MCNC: 4 G/DL (ref 3.5–5.2)
ALP SERPL-CCNC: 82 U/L (ref 40–150)
ALT SERPL W/O P-5'-P-CCNC: 16 U/L (ref 10–44)
ANION GAP SERPL CALC-SCNC: 10 MMOL/L (ref 8–16)
AST SERPL-CCNC: 22 U/L (ref 10–40)
BASOPHILS # BLD AUTO: 0.03 K/UL (ref 0–0.2)
BASOPHILS NFR BLD: 0.5 % (ref 0–1.9)
BILIRUB SERPL-MCNC: 0.7 MG/DL (ref 0.1–1)
BNP SERPL-MCNC: 605 PG/ML (ref 0–99)
BUN SERPL-MCNC: 17 MG/DL (ref 8–23)
CALCIUM SERPL-MCNC: 9 MG/DL (ref 8.7–10.5)
CHLORIDE SERPL-SCNC: 100 MMOL/L (ref 95–110)
CO2 SERPL-SCNC: 19 MMOL/L (ref 23–29)
CREAT SERPL-MCNC: 1.2 MG/DL (ref 0.5–1.4)
DIFFERENTIAL METHOD BLD: ABNORMAL
EOSINOPHIL # BLD AUTO: 0 K/UL (ref 0–0.5)
EOSINOPHIL NFR BLD: 0 % (ref 0–8)
ERYTHROCYTE [DISTWIDTH] IN BLOOD BY AUTOMATED COUNT: 13.3 % (ref 11.5–14.5)
EST. GFR  (NO RACE VARIABLE): 43.5 ML/MIN/1.73 M^2
GLUCOSE SERPL-MCNC: 90 MG/DL (ref 70–110)
HCT VFR BLD AUTO: 32.6 % (ref 37–48.5)
HCV AB SERPL QL IA: NORMAL
HGB BLD-MCNC: 11.1 G/DL (ref 12–16)
HIV 1+2 AB+HIV1 P24 AG SERPL QL IA: NORMAL
IMM GRANULOCYTES # BLD AUTO: 0.04 K/UL (ref 0–0.04)
IMM GRANULOCYTES NFR BLD AUTO: 0.6 % (ref 0–0.5)
INFLUENZA A, MOLECULAR: POSITIVE
INFLUENZA B, MOLECULAR: NEGATIVE
INR PPP: 2.5 (ref 0.8–1.2)
LYMPHOCYTES # BLD AUTO: 1.1 K/UL (ref 1–4.8)
LYMPHOCYTES NFR BLD: 17.1 % (ref 18–48)
MCH RBC QN AUTO: 30.6 PG (ref 27–31)
MCHC RBC AUTO-ENTMCNC: 34 G/DL (ref 32–36)
MCV RBC AUTO: 90 FL (ref 82–98)
MONOCYTES # BLD AUTO: 0.7 K/UL (ref 0.3–1)
MONOCYTES NFR BLD: 11.4 % (ref 4–15)
NEUTROPHILS # BLD AUTO: 4.4 K/UL (ref 1.8–7.7)
NEUTROPHILS NFR BLD: 70.4 % (ref 38–73)
NRBC BLD-RTO: 0 /100 WBC
PLATELET # BLD AUTO: 224 K/UL (ref 150–450)
PMV BLD AUTO: 9 FL (ref 9.2–12.9)
POTASSIUM SERPL-SCNC: 3.9 MMOL/L (ref 3.5–5.1)
PROT SERPL-MCNC: 7.7 G/DL (ref 6–8.4)
PROTHROMBIN TIME: 26 SEC (ref 9–12.5)
RBC # BLD AUTO: 3.63 M/UL (ref 4–5.4)
SARS-COV-2 RDRP RESP QL NAA+PROBE: NEGATIVE
SODIUM SERPL-SCNC: 129 MMOL/L (ref 136–145)
SPECIMEN SOURCE: ABNORMAL
TROPONIN I SERPL DL<=0.01 NG/ML-MCNC: 18 NG/L (ref 0–14)
WBC # BLD AUTO: 6.24 K/UL (ref 3.9–12.7)

## 2025-01-29 PROCEDURE — 84484 ASSAY OF TROPONIN QUANT: CPT | Performed by: EMERGENCY MEDICINE

## 2025-01-29 PROCEDURE — 80053 COMPREHEN METABOLIC PANEL: CPT | Performed by: EMERGENCY MEDICINE

## 2025-01-29 PROCEDURE — 85025 COMPLETE CBC W/AUTO DIFF WBC: CPT | Performed by: EMERGENCY MEDICINE

## 2025-01-29 PROCEDURE — 96374 THER/PROPH/DIAG INJ IV PUSH: CPT

## 2025-01-29 PROCEDURE — 12000002 HC ACUTE/MED SURGE SEMI-PRIVATE ROOM

## 2025-01-29 PROCEDURE — 83880 ASSAY OF NATRIURETIC PEPTIDE: CPT | Performed by: EMERGENCY MEDICINE

## 2025-01-29 PROCEDURE — 83036 HEMOGLOBIN GLYCOSYLATED A1C: CPT | Performed by: PHYSICIAN ASSISTANT

## 2025-01-29 PROCEDURE — 25000003 PHARM REV CODE 250

## 2025-01-29 PROCEDURE — 87502 INFLUENZA DNA AMP PROBE: CPT | Performed by: EMERGENCY MEDICINE

## 2025-01-29 PROCEDURE — 85610 PROTHROMBIN TIME: CPT | Performed by: EMERGENCY MEDICINE

## 2025-01-29 PROCEDURE — 99285 EMERGENCY DEPT VISIT HI MDM: CPT | Mod: 25

## 2025-01-29 PROCEDURE — 87389 HIV-1 AG W/HIV-1&-2 AB AG IA: CPT | Performed by: PHYSICIAN ASSISTANT

## 2025-01-29 PROCEDURE — 83735 ASSAY OF MAGNESIUM: CPT | Performed by: PHYSICIAN ASSISTANT

## 2025-01-29 PROCEDURE — 86803 HEPATITIS C AB TEST: CPT | Performed by: PHYSICIAN ASSISTANT

## 2025-01-29 PROCEDURE — 93005 ELECTROCARDIOGRAM TRACING: CPT

## 2025-01-29 PROCEDURE — 87635 SARS-COV-2 COVID-19 AMP PRB: CPT | Performed by: EMERGENCY MEDICINE

## 2025-01-29 PROCEDURE — 63600175 PHARM REV CODE 636 W HCPCS

## 2025-01-29 PROCEDURE — 93010 ELECTROCARDIOGRAM REPORT: CPT | Mod: ,,, | Performed by: INTERNAL MEDICINE

## 2025-01-29 RX ORDER — OSELTAMIVIR PHOSPHATE 75 MG/1
75 CAPSULE ORAL
Status: COMPLETED | OUTPATIENT
Start: 2025-01-29 | End: 2025-01-29

## 2025-01-29 RX ORDER — VALSARTAN 160 MG/1
320 TABLET ORAL DAILY
Status: DISCONTINUED | OUTPATIENT
Start: 2025-01-30 | End: 2025-01-31 | Stop reason: HOSPADM

## 2025-01-29 RX ORDER — SODIUM CHLORIDE 0.9 % (FLUSH) 0.9 %
10 SYRINGE (ML) INJECTION
Status: DISCONTINUED | OUTPATIENT
Start: 2025-01-30 | End: 2025-01-31 | Stop reason: HOSPADM

## 2025-01-29 RX ORDER — ONDANSETRON 4 MG/1
8 TABLET, ORALLY DISINTEGRATING ORAL EVERY 8 HOURS PRN
Status: DISCONTINUED | OUTPATIENT
Start: 2025-01-30 | End: 2025-01-31 | Stop reason: HOSPADM

## 2025-01-29 RX ORDER — WARFARIN SODIUM 5 MG/1
5 TABLET ORAL
Status: DISCONTINUED | OUTPATIENT
Start: 2025-01-30 | End: 2025-01-31 | Stop reason: HOSPADM

## 2025-01-29 RX ORDER — TALC
6 POWDER (GRAM) TOPICAL NIGHTLY PRN
Status: DISCONTINUED | OUTPATIENT
Start: 2025-01-30 | End: 2025-01-31 | Stop reason: HOSPADM

## 2025-01-29 RX ORDER — CALCIUM CARBONATE 200(500)MG
1000 TABLET,CHEWABLE ORAL 2 TIMES DAILY PRN
Status: DISCONTINUED | OUTPATIENT
Start: 2025-01-30 | End: 2025-01-31 | Stop reason: HOSPADM

## 2025-01-29 RX ORDER — FUROSEMIDE 10 MG/ML
40 INJECTION INTRAMUSCULAR; INTRAVENOUS
Status: COMPLETED | OUTPATIENT
Start: 2025-01-29 | End: 2025-01-29

## 2025-01-29 RX ORDER — METOPROLOL SUCCINATE 100 MG/1
100 TABLET, EXTENDED RELEASE ORAL 2 TIMES DAILY
Status: DISCONTINUED | OUTPATIENT
Start: 2025-01-30 | End: 2025-01-31 | Stop reason: HOSPADM

## 2025-01-29 RX ORDER — LEVOTHYROXINE SODIUM 75 UG/1
75 TABLET ORAL
Status: DISCONTINUED | OUTPATIENT
Start: 2025-01-30 | End: 2025-01-31 | Stop reason: HOSPADM

## 2025-01-29 RX ORDER — GABAPENTIN 100 MG/1
100 CAPSULE ORAL NIGHTLY
Status: DISCONTINUED | OUTPATIENT
Start: 2025-01-30 | End: 2025-01-31 | Stop reason: HOSPADM

## 2025-01-29 RX ORDER — FUROSEMIDE 10 MG/ML
40 INJECTION INTRAMUSCULAR; INTRAVENOUS EVERY 12 HOURS
Status: DISCONTINUED | OUTPATIENT
Start: 2025-01-30 | End: 2025-01-30

## 2025-01-29 RX ORDER — LOPERAMIDE HYDROCHLORIDE 2 MG/1
2 CAPSULE ORAL
Status: DISCONTINUED | OUTPATIENT
Start: 2025-01-30 | End: 2025-01-31 | Stop reason: HOSPADM

## 2025-01-29 RX ORDER — ACETAMINOPHEN 500 MG
1000 TABLET ORAL
Status: COMPLETED | OUTPATIENT
Start: 2025-01-29 | End: 2025-01-29

## 2025-01-29 RX ORDER — OSELTAMIVIR PHOSPHATE 30 MG/1
30 CAPSULE ORAL 2 TIMES DAILY
Status: DISCONTINUED | OUTPATIENT
Start: 2025-01-30 | End: 2025-01-31 | Stop reason: HOSPADM

## 2025-01-29 RX ORDER — ALBUTEROL SULFATE 90 UG/1
2 INHALANT RESPIRATORY (INHALATION) EVERY 6 HOURS PRN
Status: DISCONTINUED | OUTPATIENT
Start: 2025-01-30 | End: 2025-01-31 | Stop reason: HOSPADM

## 2025-01-29 RX ORDER — ACETAMINOPHEN 325 MG/1
650 TABLET ORAL EVERY 4 HOURS PRN
Status: DISCONTINUED | OUTPATIENT
Start: 2025-01-30 | End: 2025-01-31 | Stop reason: HOSPADM

## 2025-01-29 RX ADMIN — OSELTAMIVIR PHOSPHATE 75 MG: 75 CAPSULE ORAL at 10:01

## 2025-01-29 RX ADMIN — FUROSEMIDE 40 MG: 10 INJECTION, SOLUTION INTRAMUSCULAR; INTRAVENOUS at 10:01

## 2025-01-29 RX ADMIN — ACETAMINOPHEN 1000 MG: 500 TABLET ORAL at 10:01

## 2025-01-29 NOTE — FIRST PROVIDER EVALUATION
"Medical screening examination initiated.  I have conducted a focused provider triage encounter, findings are as follows:    Brief history of present illness:  Congestion, + cough, and mild SOB. Sent in for CXR.   No CP, no fevers.     Some body aches.     Vitals:    01/29/25 1708   BP: (!) 189/80   BP Location: Right arm   Pulse: 92   Resp: 19   Temp: 99.7 °F (37.6 °C)   TempSrc: Oral   SpO2: (!) 92%   Weight: 69.9 kg (154 lb)   Height: 5' 6" (1.676 m)       Pertinent physical exam:  NAD, well appearing, walks slowly but steady, breathing comfortably.   Sat 92 on RA. Not on home O2     Brief workup plan:  EKG, labs, CXR, viral swabs.     Preliminary workup initiated; this workup will be continued and followed by the physician or advanced practice provider that is assigned to the patient when roomed.  "

## 2025-01-29 NOTE — PROVIDER PROGRESS NOTES - EMERGENCY DEPT.
Encounter Date: 1/29/2025    ED Physician Progress Notes         EKG - STEMI Decision  Initial Reading: No STEMI present.

## 2025-01-30 ENCOUNTER — DOCUMENTATION ONLY (OUTPATIENT)
Dept: CARDIOLOGY | Facility: CLINIC | Age: 89
End: 2025-01-30
Payer: MEDICARE

## 2025-01-30 PROBLEM — Z79.01 ANTICOAGULANT LONG-TERM USE: Status: ACTIVE | Noted: 2025-01-30

## 2025-01-30 PROBLEM — I50.9 ACUTE CHF: Status: ACTIVE | Noted: 2025-01-30

## 2025-01-30 PROBLEM — E87.6 HYPOKALEMIA: Status: ACTIVE | Noted: 2025-01-30

## 2025-01-30 PROBLEM — J90 PLEURAL EFFUSION: Status: ACTIVE | Noted: 2025-01-30

## 2025-01-30 PROBLEM — J10.1 INFLUENZA A: Status: ACTIVE | Noted: 2025-01-30

## 2025-01-30 PROBLEM — E87.1 HYPONATREMIA: Status: ACTIVE | Noted: 2025-01-30

## 2025-01-30 PROBLEM — D64.9 CHRONIC ANEMIA: Status: ACTIVE | Noted: 2025-01-30

## 2025-01-30 LAB
ANION GAP SERPL CALC-SCNC: 13 MMOL/L (ref 8–16)
ASCENDING AORTA: 3.24 CM
AV AREA BY CONTINUOUS VTI: 3.2 CM2
AV INDEX (PROSTH): 0.89
AV LVOT MEAN GRADIENT: 1 MMHG
AV LVOT PEAK GRADIENT: 2 MMHG
AV MEAN GRADIENT: 2 MMHG
AV PEAK GRADIENT: 3 MMHG
AV VALVE AREA BY VELOCITY RATIO: 3 CM²
AV VALVE AREA: 3.4 CM2
AV VELOCITY RATIO: 0.78
BSA FOR ECHO PROCEDURE: 1.78 M2
BUN SERPL-MCNC: 20 MG/DL (ref 8–23)
CALCIUM SERPL-MCNC: 8.6 MG/DL (ref 8.7–10.5)
CHLORIDE SERPL-SCNC: 97 MMOL/L (ref 95–110)
CO2 SERPL-SCNC: 21 MMOL/L (ref 23–29)
CREAT SERPL-MCNC: 1.2 MG/DL (ref 0.5–1.4)
CV ECHO LV RWT: 0.37 CM
DOP CALC AO PEAK VEL: 0.9 M/S
DOP CALC AO VTI: 15.6 CM
DOP CALC LVOT AREA: 3.8 CM2
DOP CALC LVOT DIAMETER: 2.2 CM
DOP CALC LVOT PEAK VEL: 0.7 M/S
DOP CALC LVOT STROKE VOLUME: 52.8 CM3
DOP CALCLVOT PEAK VEL VTI: 13.9 CM
E/E' RATIO: 10 M/S
ECHO EF ESTIMATED: 61 %
ECHO LV POSTERIOR WALL: 0.9 CM (ref 0.6–1.1)
EST. GFR  (NO RACE VARIABLE): 43.5 ML/MIN/1.73 M^2
ESTIMATED AVG GLUCOSE: 105 MG/DL (ref 68–131)
FRACTIONAL SHORTENING: 32.7 % (ref 28–44)
GLUCOSE SERPL-MCNC: 90 MG/DL (ref 70–110)
HBA1C MFR BLD: 5.3 % (ref 4–5.6)
INR PPP: 2.9 (ref 0.8–1.2)
INTERVENTRICULAR SEPTUM: 1 CM (ref 0.6–1.1)
LA MAJOR: 7.2 CM
LA MINOR: 6.7 CM
LA WIDTH: 4.4 CM
LEFT ATRIUM SIZE: 5 CM
LEFT ATRIUM VOLUME INDEX MOD: 44 ML/M2
LEFT ATRIUM VOLUME INDEX: 73 ML/M2
LEFT ATRIUM VOLUME MOD: 77 ML
LEFT ATRIUM VOLUME: 130 CM3
LEFT INTERNAL DIMENSION IN SYSTOLE: 3.3 CM (ref 2.1–4)
LEFT VENTRICLE DIASTOLIC VOLUME INDEX: 65.08 ML/M2
LEFT VENTRICLE DIASTOLIC VOLUME: 115.2 ML
LEFT VENTRICLE MASS INDEX: 92.8 G/M2
LEFT VENTRICLE SYSTOLIC VOLUME INDEX: 25.1 ML/M2
LEFT VENTRICLE SYSTOLIC VOLUME: 44.38 ML
LEFT VENTRICULAR INTERNAL DIMENSION IN DIASTOLE: 4.9 CM (ref 3.5–6)
LEFT VENTRICULAR MASS: 164.3 G
LV LATERAL E/E' RATIO: 8.1 M/S
LV SEPTAL E/E' RATIO: 13.9 M/S
MAGNESIUM SERPL-MCNC: 1.6 MG/DL (ref 1.6–2.6)
MV PEAK E VEL: 0.97 M/S
OHS CV RV/LV RATIO: 0.73 CM
OHS QRS DURATION: 92 MS
OHS QTC CALCULATION: 467 MS
PISA TR MAX VEL: 2.4 M/S
POTASSIUM SERPL-SCNC: 3.3 MMOL/L (ref 3.5–5.1)
PROTHROMBIN TIME: 29.3 SEC (ref 9–12.5)
RA MAJOR: 6.2 CM
RA PRESSURE ESTIMATED: 3 MMHG
RA WIDTH: 4.2 CM
RIGHT VENTRICLE DIASTOLIC BASEL DIMENSION: 3.6 CM
RV TB RVSP: 5 MMHG
SINUS: 3.35 CM
SODIUM SERPL-SCNC: 131 MMOL/L (ref 136–145)
STJ: 3.08 CM
TDI LATERAL: 0.12 M/S
TDI SEPTAL: 0.07 M/S
TDI: 0.1 M/S
TR MAX PG: 23 MMHG
TRICUSPID ANNULAR PLANE SYSTOLIC EXCURSION: 0.83 CM
TROPONIN I SERPL DL<=0.01 NG/ML-MCNC: 21 NG/L (ref 0–14)
TROPONIN I SERPL DL<=0.01 NG/ML-MCNC: 32 NG/L (ref 0–14)
TV PEAK GRADIENT: 23 MMHG
TV REST PULMONARY ARTERY PRESSURE: 26 MMHG
Z-SCORE OF LEFT VENTRICULAR DIMENSION IN END DIASTOLE: 0.01
Z-SCORE OF LEFT VENTRICULAR DIMENSION IN END SYSTOLE: 0.69

## 2025-01-30 PROCEDURE — 27000207 HC ISOLATION

## 2025-01-30 PROCEDURE — 85610 PROTHROMBIN TIME: CPT | Performed by: INTERNAL MEDICINE

## 2025-01-30 PROCEDURE — 80048 BASIC METABOLIC PNL TOTAL CA: CPT | Performed by: INTERNAL MEDICINE

## 2025-01-30 PROCEDURE — 36415 COLL VENOUS BLD VENIPUNCTURE: CPT | Performed by: INTERNAL MEDICINE

## 2025-01-30 PROCEDURE — 25000003 PHARM REV CODE 250: Performed by: INTERNAL MEDICINE

## 2025-01-30 PROCEDURE — 84484 ASSAY OF TROPONIN QUANT: CPT | Mod: 91 | Performed by: INTERNAL MEDICINE

## 2025-01-30 PROCEDURE — 63600175 PHARM REV CODE 636 W HCPCS: Performed by: INTERNAL MEDICINE

## 2025-01-30 PROCEDURE — 20600001 HC STEP DOWN PRIVATE ROOM

## 2025-01-30 PROCEDURE — 84484 ASSAY OF TROPONIN QUANT: CPT | Performed by: INTERNAL MEDICINE

## 2025-01-30 RX ORDER — POTASSIUM CHLORIDE 20 MEQ/1
40 TABLET, EXTENDED RELEASE ORAL ONCE
Status: COMPLETED | OUTPATIENT
Start: 2025-01-30 | End: 2025-01-30

## 2025-01-30 RX ORDER — WARFARIN 2.5 MG/1
2.5 TABLET ORAL
Status: DISCONTINUED | OUTPATIENT
Start: 2025-02-01 | End: 2025-01-31 | Stop reason: HOSPADM

## 2025-01-30 RX ADMIN — WARFARIN SODIUM 5 MG: 5 TABLET ORAL at 05:01

## 2025-01-30 RX ADMIN — METOPROLOL SUCCINATE 100 MG: 100 TABLET, EXTENDED RELEASE ORAL at 08:01

## 2025-01-30 RX ADMIN — OSELTAMIVIR PHOSPHATE 30 MG: 30 CAPSULE ORAL at 08:01

## 2025-01-30 RX ADMIN — VALSARTAN 320 MG: 160 TABLET, FILM COATED ORAL at 08:01

## 2025-01-30 RX ADMIN — ACETAMINOPHEN 650 MG: 325 TABLET ORAL at 08:01

## 2025-01-30 RX ADMIN — FUROSEMIDE 40 MG: 10 INJECTION, SOLUTION INTRAMUSCULAR; INTRAVENOUS at 08:01

## 2025-01-30 RX ADMIN — ACETAMINOPHEN 650 MG: 325 TABLET ORAL at 05:01

## 2025-01-30 RX ADMIN — GABAPENTIN 100 MG: 100 CAPSULE ORAL at 08:01

## 2025-01-30 RX ADMIN — POTASSIUM CHLORIDE 40 MEQ: 1500 TABLET, EXTENDED RELEASE ORAL at 10:01

## 2025-01-30 RX ADMIN — LEVOTHYROXINE SODIUM 75 MCG: 75 TABLET ORAL at 06:01

## 2025-01-30 RX ADMIN — OSELTAMIVIR PHOSPHATE 30 MG: 30 CAPSULE ORAL at 10:01

## 2025-01-30 NOTE — ASSESSMENT & PLAN NOTE
Patient has paroxysmal (<7 days) atrial fibrillation. Patient is currently in atrial fibrillation. [id number] Score: 3. The patients heart rate in the last 24 hours is as follows:  Pulse  Min: 86  Max: 92     Antiarrhythmics  metoprolol succinate (TOPROL-XL) 24 hr tablet 100 mg, 2 times daily, Oral    Anticoagulants  warfarin (COUMADIN) tablet 5 mg, User specified, Oral  warfarin (COUMADIN) tablet 2.5 mg, User specified, Oral    Plan  - Replete lytes with a goal of K>4, Mg >2  - Patient is anticoagulated, see medications listed above.  - Patient's afib is currently controlled

## 2025-01-30 NOTE — ASSESSMENT & PLAN NOTE
Patient's blood pressure range in the last 24 hours was: BP  Min: 163/96  Max: 189/80.The patient's inpatient anti-hypertensive regimen is listed below:  Current Antihypertensives  metoprolol succinate (TOPROL-XL) 24 hr tablet 100 mg, 2 times daily, Oral  valsartan tablet 320 mg, Daily, Oral  furosemide injection 40 mg, Every 12 hours, Intravenous    Plan  - BP is uncontrolled, will adjust as follows: give home meds as needed

## 2025-01-30 NOTE — PROGRESS NOTES
[facility] ([facility]) Team notified of pt referral via [facility] (automated inbasket notification) .    Patient screened today 1- by provider and RN for enrollment to program.      Pt was deemed not a candidate for enrollment at this time related to patient current admission diagnosis/ problem will not benefit from the Heart Failure Transitional Care Program. R/O PP FLU    Pt will require additional follow up planning per primary team.     If pt status, diagnosis, or treatment plan changes , please place AMB referral to [facility] ([id number]) for [facility] enrollment re-evalution.

## 2025-01-30 NOTE — HPI
89 yo female with HTN, pAFib, PVD, neuropathy, CKD3, hypothyroid presenting for about 4 days of congestion, cough and fevers. She states she has been feeling bad for the past 4 days and also had some associated diarrhea. She also has generalized myalgias as well. She does not have sick contacts but some shortness of breath. She was also found to have signs of fluid overload on CXR and an elevated BNP and troponin. Flu was positive in the ER. Medicine called for admission.

## 2025-01-30 NOTE — CARE UPDATE
Patient admitted for presumed newly diagnosed heart failure, found to be influenza positive and started on Tamiflu.  BNP of 605 on presentation, troponins 18 and subsequently 32.  Patient feels better, chest clear to auscultation, no pedal edema.  Echocardiogram was done today, report pending.  We will hold Lasix for now until formal diagnosis of heart failure.  Troponin seems flat thus far, we will continue to trend.  Continue home anticoagulation.  Daughter updated at bedside

## 2025-01-30 NOTE — SUBJECTIVE & OBJECTIVE
Past Medical History:   Diagnosis Date    Atrial fibrillation     Cellulitis     Hypertension     Thyroid disease        Past Surgical History:   Procedure Laterality Date    APPENDECTOMY      HIP FRACTURE SURGERY  2024    HYSTERECTOMY, VAGINAL, WITH SALPINGO-OOPHORECTOMY      KNEE ARTHROSCOPY Bilateral     MULTIPLE TOOTH EXTRACTIONS      TONSILLECTOMY         Review of patient's allergies indicates:   Allergen Reactions    Amlodipine Other (See Comments)     Leg swelling       No current facility-administered medications on file prior to encounter.     Current Outpatient Medications on File Prior to Encounter   Medication Sig    calcium carbonate (CALCIUM 500) 500 mg calcium (1,250 mg) chewable tablet Take 1 tablet (500 mg total) by mouth 2 (two) times daily. (Patient not taking: Reported on 1/24/2025)    erythromycin (ROMYCIN) ophthalmic ointment Place into the right eye every 8 (eight) hours.    gabapentin (NEURONTIN) 100 MG capsule Take 1 capsule (100 mg total) by mouth every evening.    hydrALAZINE (APRESOLINE) 50 MG tablet Take 1 tablet (50 mg total) by mouth every 12 (twelve) hours.    levothyroxine (SYNTHROID) 75 MCG tablet TAKE 1 TABLET BY MOUTH BEFORE BREAKFAST    metoprolol succinate (TOPROL-XL) 100 MG 24 hr tablet Take 1 tablet by mouth twice daily    valsartan (DIOVAN) 320 MG tablet Take 1 tablet (320 mg total) by mouth once daily.    vitamin D (VITAMIN D3) 1000 units Tab Take 2,000 Units by mouth once daily.    warfarin (COUMADIN) 5 MG tablet Take 1 tablet (5 mg total) by mouth Daily.     Family History       Problem Relation (Age of Onset)    Cancer Mother (66)    Hypertension Father    Stroke Father (59)          Tobacco Use    Smoking status: Never     Passive exposure: Never    Smokeless tobacco: Never   Substance and Sexual Activity    Alcohol use: Yes     Comment: Occasional    Drug use: Never    Sexual activity: Not Currently     Review of Systems   Constitutional:  Positive for activity change  and fever. Negative for appetite change and chills.   HENT:  Positive for congestion. Negative for hearing loss and rhinorrhea.    Eyes:  Negative for discharge, itching and visual disturbance.   Respiratory:  Positive for cough and shortness of breath. Negative for apnea.    Cardiovascular:  Positive for chest pain. Negative for palpitations and leg swelling.   Gastrointestinal:  Positive for diarrhea. Negative for abdominal distention, abdominal pain, constipation, nausea and vomiting.   Endocrine: Negative for cold intolerance and heat intolerance.   Genitourinary:  Negative for dysuria and hematuria.   Musculoskeletal:  Negative for back pain, neck pain and neck stiffness.   Skin:  Negative for rash and wound.   Neurological:  Negative for dizziness, seizures, light-headedness and headaches.   Psychiatric/Behavioral:  Negative for agitation, confusion and suicidal ideas.      Objective:     Vital Signs (Most Recent):  Temp: 99.5 °F (37.5 °C) (01/29/25 2131)  Pulse: 89 (01/29/25 2245)  Resp: (!) 24 (01/29/25 2245)  BP: (!) 169/104 (01/29/25 2245)  SpO2: (!) 92 % (01/29/25 2245) Vital Signs (24h Range):  Temp:  [99.5 °F (37.5 °C)-99.7 °F (37.6 °C)] 99.5 °F (37.5 °C)  Pulse:  [86-92] 89  Resp:  [19-25] 24  SpO2:  [92 %-94 %] 92 %  BP: (163-189)/() 169/104     Weight: 69.9 kg (154 lb)  Body mass index is 24.86 kg/m².     Physical Exam  Vitals reviewed.   Constitutional:       General: She is not in acute distress.     Appearance: She is well-developed.   HENT:      Head: Normocephalic and atraumatic.      Nose: Nose normal. No rhinorrhea.      Mouth/Throat:      Mouth: Mucous membranes are moist.   Eyes:      General: No scleral icterus.        Right eye: No discharge.         Left eye: No discharge.      Pupils: Pupils are equal, round, and reactive to light.   Neck:      Vascular: No JVD.   Cardiovascular:      Rate and Rhythm: Normal rate and regular rhythm.      Heart sounds: Normal heart sounds. No murmur  heard.     No friction rub.   Pulmonary:      Effort: Pulmonary effort is normal. No respiratory distress.      Breath sounds: Normal breath sounds. No wheezing.   Abdominal:      General: Bowel sounds are normal. There is no distension.      Palpations: Abdomen is soft.      Tenderness: There is no abdominal tenderness.   Musculoskeletal:         General: No deformity. Normal range of motion.      Cervical back: Normal range of motion and neck supple.   Skin:     General: Skin is warm and dry.   Neurological:      General: No focal deficit present.      Mental Status: She is alert and oriented to person, place, and time.   Psychiatric:         Mood and Affect: Mood normal.         Behavior: Behavior normal.              CRANIAL NERVES     CN III, IV, VI   Pupils are equal, round, and reactive to light.       Significant Labs: All pertinent labs within the past 24 hours have been reviewed.  CBC:   Recent Labs   Lab 01/29/25  1828   WBC 6.24   HGB 11.1*   HCT 32.6*        CMP:   Recent Labs   Lab 01/29/25  1828   *   K 3.9      CO2 19*   GLU 90   BUN 17   CREATININE 1.2   CALCIUM 9.0   PROT 7.7   ALBUMIN 4.0   BILITOT 0.7   ALKPHOS 82   AST 22   ALT 16   ANIONGAP 10     Cardiac Markers:   Recent Labs   Lab 01/29/25  1828   *     Troponin:   Recent Labs   Lab 01/29/25  1828   TROPONINIHS 18*       Significant Imaging: I have reviewed all pertinent imaging results/findings within the past 24 hours.

## 2025-01-30 NOTE — ASSESSMENT & PLAN NOTE
Patient has likely Systolic (HFrEF) heart failure that is Acute. On presentation their CHF was decompensated. Evidence of decompensated CHF on presentation includes: crackles on lung auscultation and shortness of breath. The etiology of their decompensation is likely increased fluid intake. Most recent BNP and echo results are listed below.  Recent Labs     01/29/25  1828   *     Latest ECHO  No results found for this or any previous visit.    Current Heart Failure Medications  metoprolol succinate (TOPROL-XL) 24 hr tablet 100 mg, 2 times daily, Oral  valsartan tablet 320 mg, Daily, Oral  furosemide injection 40 mg, Every 12 hours, Intravenous    Plan  - Monitor strict I&Os and daily weights.    - Place on telemetry  - Low sodium diet  - Place on fluid restriction of 1.5 L.   - Cardiology has not been consulted  - The patient's volume status is worsening as indicated by crackles on lung auscultation and shortness of breath. Will continue current treatment  - LASIX 40mg BID

## 2025-01-30 NOTE — CARE UPDATE
I have reviewed the chart of Sola Pitt who is hospitalized for the following:    Active Hospital Problems    Diagnosis    *Acute CHF    Anticoagulant long-term use     Coumadin for Afib  Continue to monitor INR daily while inpatient       Chronic anemia     At baseline, continue to monitor       Hyponatremia     Na 131, monitor closely with diuresis       Hypokalemia     K 3.3, plan to replace PRN K>4  Monitor closely with diuresis       Pleural effusion     CXR with small pleural effusion, likely to improve with diuresis       Influenza A     Positive for influenza A on admit (1/29)   Tamiflu initiated       Neuropathy     Continue home gabapentin       Primary hypertension     Changed Valsartan- HCTZ 320-25 mg daily to Valsartan 320mg and amlodipine 5mg daily but develop leg swelling.  Hydralazine 50 mg daily, will be increased to twice a day.        Hyperlipidemia     Reports pain with statins. Wants conservative management.      Stage 3b chronic kidney disease     On valsartan 320mg day      Paroxysmal atrial fibrillation     On Coumadin 5 mg day      Hypothyroid     On levothyroxine 75 mcg daily          Ara Singh PA-C  [facility]

## 2025-01-30 NOTE — PLAN OF CARE
SW attempted to meet with pt to complete assessment. Pt was somnolent and not verbally responding. SW will see pt when she is more alert and responsive.    Heydi Coffey, MSW  [id number]

## 2025-01-30 NOTE — ASSESSMENT & PLAN NOTE
Creatine stable for now. BMP reviewed- noted Estimated Creatinine Clearance: 30.3 mL/min (based on SCr of 1.2 mg/dL). according to latest data. Based on current GFR, CKD stage is stage 3 - GFR 30-59.  Monitor UOP and serial BMP and adjust therapy as needed. Renally dose meds. Avoid nephrotoxic medications and procedures.

## 2025-01-30 NOTE — ED PROVIDER NOTES
"Source of History:  Patient and chart    Chief complaint:  Chest Congestion (Chest congestion x 4-5 days; "covid negative" slightly SOB. No chest pain )      HPI:  Sola Pitt is a 89 y.o. female with a medical history as below presents to the emergency department with a chief complaint of chest congestion for 3-4 days.  Patient has endorsed some coughing and the sensation of chest heaviness.  She also endorses generalized myalgias.  She denies any diarrhea, urinary, or abdominal pain.  She does endorse some subjective fevers at home and modest shortness of breath.  She has no further concerns at this time.    Review of patient's allergies indicates:   Allergen Reactions    Amlodipine Other (See Comments)     Leg swelling       No current facility-administered medications on file prior to encounter.     Current Outpatient Medications on File Prior to Encounter   Medication Sig Dispense Refill    calcium carbonate (CALCIUM 500) 500 mg calcium (1,250 mg) chewable tablet Take 1 tablet (500 mg total) by mouth 2 (two) times daily. (Patient not taking: Reported on 1/24/2025) 60 tablet 11    erythromycin (ROMYCIN) ophthalmic ointment Place into the right eye every 8 (eight) hours. 3.5 g 0    gabapentin (NEURONTIN) 100 MG capsule Take 1 capsule (100 mg total) by mouth every evening. 90 capsule 3    hydrALAZINE (APRESOLINE) 50 MG tablet Take 1 tablet (50 mg total) by mouth every 12 (twelve) hours.      levothyroxine (SYNTHROID) 75 MCG tablet TAKE 1 TABLET BY MOUTH BEFORE BREAKFAST 90 tablet 0    metoprolol succinate (TOPROL-XL) 100 MG 24 hr tablet Take 1 tablet by mouth twice daily 180 tablet 0    valsartan (DIOVAN) 320 MG tablet Take 1 tablet (320 mg total) by mouth once daily. 90 tablet 3    vitamin D (VITAMIN D3) 1000 units Tab Take 2,000 Units by mouth once daily.      warfarin (COUMADIN) 5 MG tablet Take 1 tablet (5 mg total) by mouth Daily. 90 tablet 0       PMH:  As per HPI and below:  Past Medical History: "   Diagnosis Date    Atrial fibrillation     Cellulitis     Hypertension     Thyroid disease      Past Surgical History:   Procedure Laterality Date    APPENDECTOMY      HIP FRACTURE SURGERY  2024    HYSTERECTOMY, VAGINAL, WITH SALPINGO-OOPHORECTOMY      KNEE ARTHROSCOPY Bilateral     MULTIPLE TOOTH EXTRACTIONS      TONSILLECTOMY         Social History     Socioeconomic History    Marital status:    Tobacco Use    Smoking status: Never     Passive exposure: Never    Smokeless tobacco: Never   Substance and Sexual Activity    Alcohol use: Yes     Comment: Occasional    Drug use: Never    Sexual activity: Not Currently       Family History   Problem Relation Name Age of Onset    Cancer Mother  66        Liver cancer    Hypertension Father      Stroke Father  59       Physical Exam:      Vitals:    01/29/25 2245   BP: (!) 169/104   Pulse: 89   Resp: (!) 24   Temp:      Physical Exam    Gen:  Hemodynamically stable in no acute distress  Mental Status:  Alert and oriented x3.  Appropriate conversant    Skin: Warm, dry. No rashes seen.  Eyes: No conjunctival injection.  Pulm:  Some crackles auscultated. No increased work of breathing.  No significant tachypnea.  No conversational dyspnea.    CV:  Normal rate  Abd: Soft.  Not distended.  Nontender.   MSK: No deformities.  No pitting edema    Neuro: Awake. Speech normal. No focal neuro deficit observed.      Procedures  Laboratory Studies:  Labs Reviewed   INFLUENZA A & B BY MOLECULAR - Abnormal       Result Value    Influenza A, Molecular Positive (*)     Influenza B, Molecular Negative      Flu A & B Source Nasal swab     CBC W/ AUTO DIFFERENTIAL - Abnormal    WBC 6.24      RBC 3.63 (*)     Hemoglobin 11.1 (*)     Hematocrit 32.6 (*)     MCV 90      MCH 30.6      MCHC 34.0      RDW 13.3      Platelets 224      MPV 9.0 (*)     Immature Granulocytes 0.6 (*)     Gran # (ANC) 4.4      Immature Grans (Abs) 0.04      Lymph # 1.1      Mono # 0.7      Eos # 0.0      Baso #  "0.03      nRBC 0      Gran % 70.4      Lymph % 17.1 (*)     Mono % 11.4      Eosinophil % 0.0      Basophil % 0.5      Differential Method Automated      Narrative:     Release to patient->Immediate   COMPREHENSIVE METABOLIC PANEL - Abnormal    Sodium 129 (*)     Potassium 3.9      Chloride 100      CO2 19 (*)     Glucose 90      BUN 17      Creatinine 1.2      Calcium 9.0      Total Protein 7.7      Albumin 4.0      Total Bilirubin 0.7      Alkaline Phosphatase 82      AST 22      ALT 16      eGFR 43.5 (*)     Anion Gap 10      Narrative:     Release to patient->Immediate   B-TYPE NATRIURETIC PEPTIDE - Abnormal     (*)     Narrative:     Release to patient->Immediate   TROPONIN I HIGH SENSITIVITY - Abnormal    Troponin I High Sensitivity 18 (*)     Narrative:     Release to patient->Immediate   PROTIME-INR - Abnormal    Prothrombin Time 26.0 (*)     INR 2.5 (*)     Narrative:     Release to patient->Immediate   HEPATITIS C ANTIBODY    Hepatitis C Ab Non-reactive      Narrative:     Release to patient->Immediate   HIV 1 / 2 ANTIBODY    HIV 1/2 Ag/Ab Non-reactive      Narrative:     Release to patient->Immediate   SARS-COV-2 RNA AMPLIFICATION, QUAL    SARS-CoV-2 RNA, Amplification, Qual Negative         Per my independent interpretation EKGs shows with a rate of 92.  Normal axis.  Normal intervals.  no STEMI        Chart reviewed.  Patient has a history of nor diagnosis of CHF    Imaging Results              X-Ray Chest AP Portable (Final result)  Result time 01/29/25 19:57:34      Final result by Kodak Perry MD (01/29/25 19:57:34)                   Impression:      Borderline cardiomegaly with mild interstitial edema and possible small pleural effusions.      Electronically signed by: Kodak Perry MD  Date:    01/29/2025  Time:    19:57               Narrative:    EXAMINATION:  XR CHEST AP PORTABLE    CLINICAL HISTORY:  Provided history is "SOB;  ".    TECHNIQUE:  One view of the " chest.    COMPARISON:  01/16/2006.    FINDINGS:  Cardiomediastinal silhouette is borderline enlarged.  Atherosclerotic calcifications overlie the aortic arch.  Central vascular prominence with coarse perihilar interstitial lung markings.  Minimal bibasilar interstitial opacities suggestive of interstitial pulmonary edema.  Possible superimposed pulmonary emphysema.  Suggest correlation with risk factors.  Mild blunting of the bilateral costophrenic angles, possibly trace bilateral pleural fluid.  No confluent area of consolidation.  No large pleural effusion.  No pneumothorax.  Probable linear subsegmental atelectasis or scarring in the left lung base.  Old left-sided rib fractures.                                      Medications Given:  Medications   acetaminophen tablet 1,000 mg (1,000 mg Oral Given 1/29/25 2248)   furosemide injection 40 mg (40 mg Intravenous Given 1/29/25 2249)   oseltamivir capsule 75 mg (75 mg Oral Given 1/29/25 2248)         ED Course as of 01/29/25 2252 Wed Jan 29, 2025 2221 X-Ray Chest AP Portable  Per my independent interpretation some effusion apparent on chest x-ray. [VC]      ED Course User Index  [VC] Mert Hodge MD           Discussed with:  Hospital Medicine.  They will admit the patient to their service further management.    MDM:    89 y.o. female with chest congestion    Workup is consistent with influenza a with CHF exacerbation.  The patient has been symptomatic for 4 days I have elected to initiate Tamiflu therapy.  Patient has mild CHF exacerbation.  She is satting 93% on room air which is lower than her normal baseline of 96%.    We will initiate diuresis and admit to the hospital for further management.          Medical Decision Making  Amount and/or Complexity of Data Reviewed  Radiology:  Decision-making details documented in ED Course.    Risk  OTC drugs.  Prescription drug management.         Diagnostic Impression:    1. Influenza A    2. SOB (shortness of  breath)    3. Acute on chronic congestive heart failure, unspecified heart failure type                   Patient and/or family understands the plan and is in agreement, verbalized understanding, questions answered                                Mert Hodge MD  Resident  01/29/25 [id number]

## 2025-01-30 NOTE — H&P
"[facility] - Emergency Dept  [facility] Medicine  History & Physical    Patient Name: Sola Pitt  MRN: [id number]  Patient Class: IP- Inpatient  Admission Date: 1/29/2025  Attending Physician: Emily Rust MD   Primary Care Provider: Rosy Rivear MD         Patient information was obtained from patient, relative(s), past medical records, and ER records.     Subjective:     Principal Problem:Acute CHF    Chief Complaint:   Chief Complaint   Patient presents with    Chest Congestion     Chest congestion x 4-5 days; "covid negative" slightly SOB. No chest pain         HPI: 88 yo female with HTN, pAFib, PVD, neuropathy, CKD3, hypothyroid presenting for about 4 days of congestion, cough and fevers. She states she has been feeling bad for the past 4 days and also had some associated diarrhea. She also has generalized myalgias as well. She does not have sick contacts but some shortness of breath. She was also found to have signs of fluid overload on CXR and an elevated BNP and troponin. Flu was positive in the ER. Medicine called for admission.     Past Medical History:   Diagnosis Date    Atrial fibrillation     Cellulitis     Hypertension     Thyroid disease        Past Surgical History:   Procedure Laterality Date    APPENDECTOMY      HIP FRACTURE SURGERY  2024    HYSTERECTOMY, VAGINAL, WITH SALPINGO-OOPHORECTOMY      KNEE ARTHROSCOPY Bilateral     MULTIPLE TOOTH EXTRACTIONS      TONSILLECTOMY         Review of patient's allergies indicates:   Allergen Reactions    Amlodipine Other (See Comments)     Leg swelling       No current facility-administered medications on file prior to encounter.     Current Outpatient Medications on File Prior to Encounter   Medication Sig    calcium carbonate (CALCIUM 500) 500 mg calcium (1,250 mg) chewable tablet Take 1 tablet (500 mg total) by mouth 2 (two) times daily. (Patient not taking: Reported on 1/24/2025)    erythromycin (ROMYCIN) ophthalmic ointment Place into the right eye " every 8 (eight) hours.    gabapentin (NEURONTIN) 100 MG capsule Take 1 capsule (100 mg total) by mouth every evening.    hydrALAZINE (APRESOLINE) 50 MG tablet Take 1 tablet (50 mg total) by mouth every 12 (twelve) hours.    levothyroxine (SYNTHROID) 75 MCG tablet TAKE 1 TABLET BY MOUTH BEFORE BREAKFAST    metoprolol succinate (TOPROL-XL) 100 MG 24 hr tablet Take 1 tablet by mouth twice daily    valsartan (DIOVAN) 320 MG tablet Take 1 tablet (320 mg total) by mouth once daily.    vitamin D (VITAMIN D3) 1000 units Tab Take 2,000 Units by mouth once daily.    warfarin (COUMADIN) 5 MG tablet Take 1 tablet (5 mg total) by mouth Daily.     Family History       Problem Relation (Age of Onset)    Cancer Mother (66)    Hypertension Father    Stroke Father (59)          Tobacco Use    Smoking status: Never     Passive exposure: Never    Smokeless tobacco: Never   Substance and Sexual Activity    Alcohol use: Yes     Comment: Occasional    Drug use: Never    Sexual activity: Not Currently     Review of Systems   Constitutional:  Positive for activity change and fever. Negative for appetite change and chills.   HENT:  Positive for congestion. Negative for hearing loss and rhinorrhea.    Eyes:  Negative for discharge, itching and visual disturbance.   Respiratory:  Positive for cough and shortness of breath. Negative for apnea.    Cardiovascular:  Positive for chest pain. Negative for palpitations and leg swelling.   Gastrointestinal:  Positive for diarrhea. Negative for abdominal distention, abdominal pain, constipation, nausea and vomiting.   Endocrine: Negative for cold intolerance and heat intolerance.   Genitourinary:  Negative for dysuria and hematuria.   Musculoskeletal:  Negative for back pain, neck pain and neck stiffness.   Skin:  Negative for rash and wound.   Neurological:  Negative for dizziness, seizures, light-headedness and headaches.   Psychiatric/Behavioral:  Negative for agitation, confusion and suicidal  ideas.      Objective:     Vital Signs (Most Recent):  Temp: 99.5 °F (37.5 °C) (01/29/25 2131)  Pulse: 89 (01/29/25 2245)  Resp: (!) 24 (01/29/25 2245)  BP: (!) 169/104 (01/29/25 2245)  SpO2: (!) 92 % (01/29/25 2245) Vital Signs (24h Range):  Temp:  [99.5 °F (37.5 °C)-99.7 °F (37.6 °C)] 99.5 °F (37.5 °C)  Pulse:  [86-92] 89  Resp:  [19-25] 24  SpO2:  [92 %-94 %] 92 %  BP: (163-189)/() 169/104     Weight: 69.9 kg (154 lb)  Body mass index is 24.86 kg/m².     Physical Exam  Vitals reviewed.   Constitutional:       General: She is not in acute distress.     Appearance: She is well-developed.   HENT:      Head: Normocephalic and atraumatic.      Nose: Nose normal. No rhinorrhea.      Mouth/Throat:      Mouth: Mucous membranes are moist.   Eyes:      General: No scleral icterus.        Right eye: No discharge.         Left eye: No discharge.      Pupils: Pupils are equal, round, and reactive to light.   Neck:      Vascular: No JVD.   Cardiovascular:      Rate and Rhythm: Normal rate and regular rhythm.      Heart sounds: Normal heart sounds. No murmur heard.     No friction rub.   Pulmonary:      Effort: Pulmonary effort is normal. No respiratory distress.      Breath sounds: Normal breath sounds. No wheezing.   Abdominal:      General: Bowel sounds are normal. There is no distension.      Palpations: Abdomen is soft.      Tenderness: There is no abdominal tenderness.   Musculoskeletal:         General: No deformity. Normal range of motion.      Cervical back: Normal range of motion and neck supple.   Skin:     General: Skin is warm and dry.   Neurological:      General: No focal deficit present.      Mental Status: She is alert and oriented to person, place, and time.   Psychiatric:         Mood and Affect: Mood normal.         Behavior: Behavior normal.              CRANIAL NERVES     CN III, IV, VI   Pupils are equal, round, and reactive to light.       Significant Labs: All pertinent labs within the past 24  hours have been reviewed.  CBC:   Recent Labs   Lab 01/29/25 1828   WBC 6.24   HGB 11.1*   HCT 32.6*        CMP:   Recent Labs   Lab 01/29/25 1828   *   K 3.9      CO2 19*   GLU 90   BUN 17   CREATININE 1.2   CALCIUM 9.0   PROT 7.7   ALBUMIN 4.0   BILITOT 0.7   ALKPHOS 82   AST 22   ALT 16   ANIONGAP 10     Cardiac Markers:   Recent Labs   Lab 01/29/25 1828   *     Troponin:   Recent Labs   Lab 01/29/25 1828   TROPONINIHS 18*       Significant Imaging: I have reviewed all pertinent imaging results/findings within the past 24 hours.  Assessment/Plan:     * Acute CHF  Patient has likely Systolic (HFrEF) heart failure that is Acute. On presentation their CHF was decompensated. Evidence of decompensated CHF on presentation includes: crackles on lung auscultation and shortness of breath. The etiology of their decompensation is likely increased fluid intake. Most recent BNP and echo results are listed below.  Recent Labs     01/29/25 1828   *     Latest ECHO  No results found for this or any previous visit.    Current Heart Failure Medications  metoprolol succinate (TOPROL-XL) 24 hr tablet 100 mg, 2 times daily, Oral  valsartan tablet 320 mg, Daily, Oral  furosemide injection 40 mg, Every 12 hours, Intravenous    Plan  - Monitor strict I&Os and daily weights.    - Place on telemetry  - Low sodium diet  - Place on fluid restriction of 1.5 L.   - Cardiology has not been consulted  - The patient's volume status is worsening as indicated by crackles on lung auscultation and shortness of breath. Will continue current treatment  - LASIX 40mg BID    Hyperlipidemia  Chronic, controlled, continue to monitor    Primary hypertension  Patient's blood pressure range in the last 24 hours was: BP  Min: 163/96  Max: 189/80.The patient's inpatient anti-hypertensive regimen is listed below:  Current Antihypertensives  metoprolol succinate (TOPROL-XL) 24 hr tablet 100 mg, 2 times daily, Oral  valsartan  tablet 320 mg, Daily, Oral  furosemide injection 40 mg, Every 12 hours, Intravenous    Plan  - BP is uncontrolled, will adjust as follows: give home meds as needed    Stage 3b chronic kidney disease  Creatine stable for now. BMP reviewed- noted Estimated Creatinine Clearance: 30.3 mL/min (based on SCr of 1.2 mg/dL). according to latest data. Based on current GFR, CKD stage is stage 3 - GFR 30-59.  Monitor UOP and serial BMP and adjust therapy as needed. Renally dose meds. Avoid nephrotoxic medications and procedures.    Hypothyroid  Chronic, controlled, continue home medication      Paroxysmal atrial fibrillation  Patient has paroxysmal (<7 days) atrial fibrillation. Patient is currently in atrial fibrillation. [id number] Score: 3. The patients heart rate in the last 24 hours is as follows:  Pulse  Min: 86  Max: 92     Antiarrhythmics  metoprolol succinate (TOPROL-XL) 24 hr tablet 100 mg, 2 times daily, Oral    Anticoagulants  warfarin (COUMADIN) tablet 5 mg, User specified, Oral  warfarin (COUMADIN) tablet 2.5 mg, User specified, Oral    Plan  - Replete lytes with a goal of K>4, Mg >2  - Patient is anticoagulated, see medications listed above.  - Patient's afib is currently controlled      VTE Risk Mitigation (From admission, onward)           Ordered     warfarin (COUMADIN) tablet 2.5 mg  Once per day on Tuesday Saturday 01/30/25 0006     warfarin (COUMADIN) tablet 5 mg  Once per day on Sunday Monday Wednesday Thursday Friday 01/29/25 2330     IP VTE HIGH RISK PATIENT  Once         01/29/25 2330     Place sequential compression device  Until discontinued         01/29/25 2330     Reason for No Pharmacological VTE Prophylaxis  Once        Question:  Reasons:  Answer:  Already adequately anticoagulated on oral Anticoagulants    01/29/25 2330                                 Aniceto Brandon MD  Department of Hospital Medicine  [facility] - Emergency Dept

## 2025-01-30 NOTE — ED NOTES
"Sola Pitt, a 89 y.o. female presents to the ED w/ complaint of leg swelling, shortness of breath    Triage note:  Chief Complaint   Patient presents with    Chest Congestion     Chest congestion x 4-5 days; "covid negative" slightly SOB. No chest pain      Review of patient's allergies indicates:   Allergen Reactions    Amlodipine Other (See Comments)     Leg swelling     Past Medical History:   Diagnosis Date    Atrial fibrillation     Cellulitis     Hypertension     Thyroid disease       "

## 2025-01-30 NOTE — ED NOTES
Telemetry Verification   Patient placed on Telemetry Box  Verified with War Room  Box # [id number]   Monitor Tech Timothy   Rate 102   Rhythm AFIB

## 2025-01-30 NOTE — ED TRIAGE NOTES
Patient identifiers verified and correct for Sola Pitt 1935    LOC: The patient is awake, alert and aware of environment with an appropriate affect, the patient is oriented x 4 and speaking appropriately.   APPEARANCE: Patient appears comfortable and in no acute distress, patient is clean and well groomed.  SKIN: The skin is warm and dry, color consistent with ethnicity, patient has normal skin turgor and moist mucus membranes, skin intact, no breakdown or bruising noted.   MUSCULOSKELETAL: Patient moving all extremities spontaneously, no swelling noted.  RESPIRATORY: Airway is open and patent, respirations are spontaneous, patient has a normal effort and rate, no accessory muscle use noted, pt placed on continuous pulse ox with O2 sats noted at 97% on room air. Slight crackles bilateral.   CARDIAC: Pt placed on cardiac monitor. Patient has a normal rate and regular rhythm, no edema noted, capillary refill < 3 seconds.   GASTRO: Soft and non tender to palpation, no distention noted, normoactive bowel sounds present in all four quadrants. Pt states bowel movements have been regular.  : Pt denies any pain or frequency with urination.  NEURO: Pt opens eyes spontaneously, behavior appropriate to situation, follows commands, facial expression symmetrical, bilateral hand grasp equal and even, purposeful motor response noted, normal sensation in all extremities when touched with a finger.

## 2025-01-31 ENCOUNTER — ANTI-COAG VISIT (OUTPATIENT)
Dept: CARDIOLOGY | Facility: CLINIC | Age: 89
End: 2025-01-31
Payer: MEDICARE

## 2025-01-31 VITALS
WEIGHT: 149.94 LBS | DIASTOLIC BLOOD PRESSURE: 75 MMHG | HEIGHT: 66 IN | TEMPERATURE: 98 F | HEART RATE: 72 BPM | SYSTOLIC BLOOD PRESSURE: 135 MMHG | BODY MASS INDEX: 24.1 KG/M2 | OXYGEN SATURATION: 97 % | RESPIRATION RATE: 18 BRPM

## 2025-01-31 DIAGNOSIS — I48.0 PAROXYSMAL ATRIAL FIBRILLATION: Primary | ICD-10-CM

## 2025-01-31 LAB
ANION GAP SERPL CALC-SCNC: 14 MMOL/L (ref 8–16)
BUN SERPL-MCNC: 31 MG/DL (ref 8–23)
CALCIUM SERPL-MCNC: 9.1 MG/DL (ref 8.7–10.5)
CHLORIDE SERPL-SCNC: 96 MMOL/L (ref 95–110)
CO2 SERPL-SCNC: 23 MMOL/L (ref 23–29)
CREAT SERPL-MCNC: 1.2 MG/DL (ref 0.5–1.4)
EST. GFR  (NO RACE VARIABLE): 43.5 ML/MIN/1.73 M^2
GLUCOSE SERPL-MCNC: 80 MG/DL (ref 70–110)
INR PPP: 2.9 (ref 0.8–1.2)
POTASSIUM SERPL-SCNC: 3.7 MMOL/L (ref 3.5–5.1)
PROTHROMBIN TIME: 29.7 SEC (ref 9–12.5)
SODIUM SERPL-SCNC: 133 MMOL/L (ref 136–145)

## 2025-01-31 PROCEDURE — 25000003 PHARM REV CODE 250: Performed by: INTERNAL MEDICINE

## 2025-01-31 PROCEDURE — 36415 COLL VENOUS BLD VENIPUNCTURE: CPT | Performed by: INTERNAL MEDICINE

## 2025-01-31 PROCEDURE — 80048 BASIC METABOLIC PNL TOTAL CA: CPT | Performed by: INTERNAL MEDICINE

## 2025-01-31 PROCEDURE — 85610 PROTHROMBIN TIME: CPT | Performed by: INTERNAL MEDICINE

## 2025-01-31 RX ORDER — OSELTAMIVIR PHOSPHATE 30 MG/1
30 CAPSULE ORAL 2 TIMES DAILY
Qty: 8 CAPSULE | Refills: 0 | Status: SHIPPED | OUTPATIENT
Start: 2025-01-31 | End: 2025-02-04

## 2025-01-31 RX ADMIN — OSELTAMIVIR PHOSPHATE 30 MG: 30 CAPSULE ORAL at 08:01

## 2025-01-31 RX ADMIN — METOPROLOL SUCCINATE 100 MG: 100 TABLET, EXTENDED RELEASE ORAL at 08:01

## 2025-01-31 RX ADMIN — ACETAMINOPHEN 650 MG: 325 TABLET ORAL at 08:01

## 2025-01-31 RX ADMIN — VALSARTAN 320 MG: 160 TABLET, FILM COATED ORAL at 08:01

## 2025-01-31 RX ADMIN — LEVOTHYROXINE SODIUM 75 MCG: 75 TABLET ORAL at 06:01

## 2025-01-31 NOTE — NURSING
Patient is AAOX4, VSS, NAD. Discharge instructions reviewed and explained. Patient and daughter verbalized understanding. Tele monitor and PIV removed. Patient is to be escorted via wheel chair to vehicle by a transporter

## 2025-01-31 NOTE — PLAN OF CARE
Patient alert and oriented x4. Patient free from fall and injury. Fall precautions remained in place. VSS. Patient remained on room air Sats 95-97 %. Afib on telemetry with rate controlled. Plan of care reviewed with the patient. Intake and output monitored. Patient denies pain, chest pain, headaches, or SOB. No acute distress noted. Plan of care continues.       Problem: Adult Inpatient Plan of Care  Goal: Plan of Care Review  Outcome: Progressing  Goal: Patient-Specific Goal (Individualized)  Outcome: Progressing  Goal: Absence of Hospital-Acquired Illness or Injury  Outcome: Progressing  Goal: Optimal Comfort and Wellbeing  Outcome: Progressing  Goal: Readiness for Transition of Care  Outcome: Progressing     Problem: Fall Injury Risk  Goal: Absence of Fall and Fall-Related Injury  Outcome: Progressing     Problem: Infection  Goal: Absence of Infection Signs and Symptoms  Outcome: Progressing

## 2025-01-31 NOTE — PROGRESS NOTES
[facility]    2025 1:52 PM    Assessment/Plan:    Patient presents today with therapeutic INR.    Assessment of patient findings and chart review: hospitalized  to  with flu A, pt discharged on tamiflu    Recommendation for patient's warfarin regimen: Continue current maintenance dose    Recommend repeat INR in 4 days  _________________________________________________________________    Sola Pitt (89 y.o.) is followed by the [facility].    Anticoagulation Summary  As of 2025      INR goal:  2.0-3.0   TTR:  76.5% (5.6 mo)   INR used for dosin.9 (2025)   Warfarin maintenance plan:  2.5 mg (5 mg x 0.5) every Tue, Sat; 5 mg (5 mg x 1) all other days   Weekly warfarin total:  30 mg   Plan last modified:  Trinh Cordero, PharmD (2024)   Next INR check:  2/10/2025   Target end date:  --    Indications    Paroxysmal atrial fibrillation [I48.0]                 Anticoagulation Episode Summary       INR check location:  Anticoagulation Clinic    Preferred lab:  --    Send INR reminders to:  [facility] COUMADIN MONITORING POOL    Comments:  [facility]// pt not interested in meter          Anticoagulation Care Providers       Provider Role Specialty Phone number    Rosy Rivera MD [facility] Medicine [phone]

## 2025-01-31 NOTE — DISCHARGE SUMMARY
[facility] - Cardiology [facility] Medicine  Discharge Summary      Patient Name: Sola Pitt  MRN: [id number]  Admission Date: 1/29/2025  Hospital Length of Stay: 2 days  Discharge Date and Time:  01/31/2025 8:51 AM  Attending Physician: Joe Ty MD   Discharging Provider: Joe Ty MD  Discharge Provider Team: [facility] MED [facility]  Primary Care Provider: Rosy Rivera MD        HPI:  89 yo female with HTN, pAFib, PVD, neuropathy, CKD3, hypothyroid presenting for about 4 days of congestion, cough and fevers. She states she has been feeling bad for the past 4 days and also had some associated diarrhea. She also has generalized myalgias as well. She does not have sick contacts but some shortness of breath. She was also found to have signs of fluid overload on CXR and an elevated BNP and troponin. Flu was positive in the ER. Medicine called for admission     * No surgery found *      Hospital Course: Patient admitted for presumed newly diagnosed heart failure, found to be influenza positive and started on Tamiflu. BNP of 605 on presentation, troponins 18 and subsequently flattened. Patient feels better, chest clear to auscultation, no pedal edema. Echocardiogram was done  with no reports of heart failure. Lasix was d/c. She was d/c home to complete Tamiflu course and is to follow up with her primary care     On examination.  Elderly female in no distress.  Alert and oriented to time place and person with no focal deficits.  Abdomen is soft and nontender.  No pedal edema.  Breath sounds vesicular no Creps or rhonchi    Consults:   Consults (From admission, onward)          Status Ordering Provider     Inpatient consult to Social Work/Case Management  Once        Provider:  (Not yet assigned)    Acknowledged PADMINI DOUGLAS     Inpatient consult to Registered Dietitian/Nutritionist  Once        Provider:  (Not yet assigned)    Completed PADMINI DOUGLAS            Final Active Diagnoses:    Diagnosis Date Noted POA     PRINCIPAL PROBLEM:  Acute CHF [I50.9] 01/30/2025 Yes    Anticoagulant long-term use [Z79.01] 01/30/2025 Not Applicable    Chronic anemia [D64.9] 01/30/2025 Unknown    Hyponatremia [E87.1] 01/30/2025 Unknown    Hypokalemia [E87.6] 01/30/2025 Unknown    Pleural effusion [J90] 01/30/2025 Unknown    Influenza A [J10.1] 01/30/2025 Unknown    Neuropathy [G62.9] 01/24/2025 Yes    Primary hypertension [I10] 11/04/2024 Yes    Hyperlipidemia [E78.5] 11/04/2024 Yes    Stage 3b chronic kidney disease [N18.32] 08/05/2024 Yes    Paroxysmal atrial fibrillation [I48.0] 07/21/2024 Yes    Hypothyroid [E03.9] 07/21/2024 Yes      Problems Resolved During this Admission:      Discharged Condition: stable    Disposition: Home or Self Care    Follow Up:    Patient Instructions:   No discharge procedures on file.  Medications:  Reconciled Home Medications:      Medication List        START taking these medications      calcium carbonate 500 mg calcium (1,250 mg) chewable tablet  Commonly known as: CALCIUM 500  Take 1 tablet (500 mg total) by mouth 2 (two) times daily.     oseltamivir 30 MG capsule  Commonly known as: TAMIFLU  Take 1 capsule (30 mg total) by mouth 2 (two) times daily. for 4 days            CONTINUE taking these medications      erythromycin ophthalmic ointment  Commonly known as: ROMYCIN  Place into the right eye every 8 (eight) hours.     gabapentin 100 MG capsule  Commonly known as: NEURONTIN  Take 1 capsule (100 mg total) by mouth every evening.     hydrALAZINE 50 MG tablet  Commonly known as: APRESOLINE  Take 1 tablet (50 mg total) by mouth every 12 (twelve) hours.     levothyroxine 75 MCG tablet  Commonly known as: SYNTHROID  TAKE 1 TABLET BY MOUTH BEFORE BREAKFAST     metoprolol succinate 100 MG 24 hr tablet  Commonly known as: TOPROL-XL  Take 1 tablet by mouth twice daily     valsartan 320 MG tablet  Commonly known as: DIOVAN  Take 1 tablet (320 mg total) by mouth once daily.     vitamin D 1000 units Tab  Commonly  known as: VITAMIN D3  Take 2,000 Units by mouth once daily.     warfarin 5 MG tablet  Commonly known as: COUMADIN  Take 1 tablet (5 mg total) by mouth Daily.                Pending Diagnostic Studies:       None          Indwelling Lines/Drains at time of discharge:   Lines/Drains/Airways       None                   Time spent on the discharge of patient: 40 minutes         Joe Ty MD  Department of Hospital Medicine  [facility] - Cardiology Stepdown

## 2025-02-04 ENCOUNTER — LAB VISIT (OUTPATIENT)
Dept: LAB | Facility: HOSPITAL | Age: 89
End: 2025-02-04
Attending: INTERNAL MEDICINE
Payer: MEDICARE

## 2025-02-04 ENCOUNTER — ANTI-COAG VISIT (OUTPATIENT)
Dept: CARDIOLOGY | Facility: CLINIC | Age: 89
End: 2025-02-04
Payer: MEDICARE

## 2025-02-04 ENCOUNTER — OFFICE VISIT (OUTPATIENT)
Dept: PRIMARY CARE CLINIC | Facility: CLINIC | Age: 89
End: 2025-02-04
Payer: MEDICARE

## 2025-02-04 VITALS
SYSTOLIC BLOOD PRESSURE: 115 MMHG | HEART RATE: 69 BPM | OXYGEN SATURATION: 98 % | DIASTOLIC BLOOD PRESSURE: 75 MMHG | BODY MASS INDEX: 24.18 KG/M2 | WEIGHT: 149.81 LBS

## 2025-02-04 DIAGNOSIS — J11.00 PNEUMONIA DUE TO INFLUENZA: Primary | ICD-10-CM

## 2025-02-04 DIAGNOSIS — I10 PRIMARY HYPERTENSION: ICD-10-CM

## 2025-02-04 DIAGNOSIS — I48.0 PAROXYSMAL ATRIAL FIBRILLATION: ICD-10-CM

## 2025-02-04 DIAGNOSIS — N18.32 STAGE 3B CHRONIC KIDNEY DISEASE: ICD-10-CM

## 2025-02-04 DIAGNOSIS — M80.00XS AGE-RELATED OSTEOPOROSIS WITH CURRENT PATHOLOGICAL FRACTURE, SEQUELA: ICD-10-CM

## 2025-02-04 DIAGNOSIS — I48.0 PAROXYSMAL ATRIAL FIBRILLATION: Primary | ICD-10-CM

## 2025-02-04 LAB
25(OH)D3+25(OH)D2 SERPL-MCNC: 71 NG/ML (ref 30–96)
INR PPP: 3.6 (ref 0.8–1.2)
PROTHROMBIN TIME: 35.6 SEC (ref 9–12.5)
PTH-INTACT SERPL-MCNC: 122.6 PG/ML (ref 9–77)

## 2025-02-04 PROCEDURE — 99213 OFFICE O/P EST LOW 20 MIN: CPT | Mod: PBBFAC,PN | Performed by: INTERNAL MEDICINE

## 2025-02-04 PROCEDURE — 99999 PR PBB SHADOW E&M-EST. PATIENT-LVL III: CPT | Mod: PBBFAC,,, | Performed by: INTERNAL MEDICINE

## 2025-02-04 PROCEDURE — 36415 COLL VENOUS BLD VENIPUNCTURE: CPT | Mod: PO | Performed by: INTERNAL MEDICINE

## 2025-02-04 PROCEDURE — 82306 VITAMIN D 25 HYDROXY: CPT | Performed by: INTERNAL MEDICINE

## 2025-02-04 PROCEDURE — 85610 PROTHROMBIN TIME: CPT | Performed by: INTERNAL MEDICINE

## 2025-02-04 PROCEDURE — 99214 OFFICE O/P EST MOD 30 MIN: CPT | Mod: S$PBB,25,, | Performed by: INTERNAL MEDICINE

## 2025-02-04 PROCEDURE — 83970 ASSAY OF PARATHORMONE: CPT | Performed by: INTERNAL MEDICINE

## 2025-02-04 RX ORDER — BENZONATATE 100 MG/1
100 CAPSULE ORAL 3 TIMES DAILY PRN
Qty: 30 CAPSULE | Refills: 1 | Status: SHIPPED | OUTPATIENT
Start: 2025-02-04

## 2025-02-04 RX ORDER — PROMETHAZINE HYDROCHLORIDE 6.25 MG/5ML
6.25 SYRUP ORAL EVERY 8 HOURS PRN
Qty: 120 ML | Refills: 0 | Status: SHIPPED | OUTPATIENT
Start: 2025-02-04

## 2025-02-04 NOTE — PROGRESS NOTES
Subjective:       Patient ID: Sola Pitt is a 89 y.o. female.    Chief Complaint: Follow-up      HPI  Sola Pitt is a 89 y.o. female with hypertension, PAF, PVD, neuropathy, CKD 3, hypothyroid who presents today for Follow-up    Sola presents today for follow up after recent hospitalization with cough and weakness.    Presented malaise, diarrhea, myalgias, and abnormal CXR with elevated BNP. Found positive for Flu and addmitted on 1/29/25 for further management. She experienced low oxygen levels in the emergency room. She received Tamiflu. Echo with good EF, BNP and troponin  elevated and monitored until plataud. ECG with known a fib and no acute ischemic changes. She was discharged on 1/231/25 to complete Tamiflu with outpatient follow up.    She presents with persistent cough accompanied by audible wheezing sounds when coughing. The cough is uncomfortable but not painful with minimal phlegm production. She denies current shortness of breath. She reports a slight headache. She denies leg swelling and reports normal bowel movements and urination.    Kidney function was stable despite diuretic use. Thyroid labs were normal. Vitamin D and Coumadin levels were drawn this morning, results pending.      ROS:  General: -fever, -chills, -fatigue, -weight gain, -weight loss  Eyes: -vision changes, -redness, -discharge  ENT: -ear pain, -nasal congestion, -sore throat  Cardiovascular: -chest pain, -palpitations, -lower extremity edema  Respiratory: +cough, -shortness of breath, +wheezing  Gastrointestinal: -abdominal pain, -nausea, -vomiting, -diarrhea, -constipation, -blood in stool  Genitourinary: -dysuria, -hematuria, -frequency  Musculoskeletal: -joint pain, -muscle pain  Skin: -rash, -lesion  Neurological: +headache, -dizziness, -numbness, -tingling  Psychiatric: -anxiety, -depression, -sleep difficulty            Past Medical History:   Diagnosis Date    Atrial fibrillation     Cellulitis      Hypertension     Thyroid disease        Past Surgical History:   Procedure Laterality Date    APPENDECTOMY      HIP FRACTURE SURGERY  2024    HYSTERECTOMY, VAGINAL, WITH SALPINGO-OOPHORECTOMY      KNEE ARTHROSCOPY Bilateral     MULTIPLE TOOTH EXTRACTIONS      TONSILLECTOMY         Family History   Problem Relation Name Age of Onset    Cancer Mother  66        Liver cancer    Hypertension Father      Stroke Father  59       Social History     Socioeconomic History    Marital status:    Tobacco Use    Smoking status: Never     Passive exposure: Never    Smokeless tobacco: Never   Substance and Sexual Activity    Alcohol use: Yes     Comment: Occasional    Drug use: Never    Sexual activity: Not Currently     Social Drivers of Health     Financial Resource Strain: Low Risk  (1/30/2025)    Overall Financial Resource Strain (CARDIA)     Difficulty of Paying Living Expenses: Not hard at all   Food Insecurity: No Food Insecurity (1/30/2025)    Hunger Vital Sign     Worried About Running Out of Food in the Last Year: Never true     Ran Out of Food in the Last Year: Never true   Transportation Needs: No Transportation Needs (1/30/2025)    TRANSPORTATION NEEDS     Transportation : No   Stress: No Stress Concern Present (1/30/2025)    [employer] - Occupational Stress Questionnaire     Feeling of Stress : Not at all   Housing Stability: Low Risk  (1/30/2025)    Housing Stability Vital Sign     Unable to Pay for Housing in the Last Year: No     Homeless in the Last Year: No       Current Outpatient Medications   Medication Sig Dispense Refill    calcium carbonate (CALCIUM 500) 500 mg calcium (1,250 mg) chewable tablet Take 1 tablet (500 mg total) by mouth 2 (two) times daily. 60 tablet 11    erythromycin (ROMYCIN) ophthalmic ointment Place into the right eye every 8 (eight) hours. 3.5 g 0    gabapentin (NEURONTIN) 100 MG capsule Take 1 capsule (100 mg total) by mouth every evening. 90 capsule  "3    hydrALAZINE (APRESOLINE) 50 MG tablet Take 1 tablet (50 mg total) by mouth every 12 (twelve) hours.      levothyroxine (SYNTHROID) 75 MCG tablet TAKE 1 TABLET BY MOUTH BEFORE BREAKFAST 90 tablet 0    metoprolol succinate (TOPROL-XL) 100 MG 24 hr tablet Take 1 tablet by mouth twice daily 180 tablet 0    oseltamivir (TAMIFLU) 30 MG capsule Take 1 capsule (30 mg total) by mouth 2 (two) times daily. for 4 days 8 capsule 0    valsartan (DIOVAN) 320 MG tablet Take 1 tablet (320 mg total) by mouth once daily. 90 tablet 3    vitamin D (VITAMIN D3) 1000 units Tab Take 2,000 Units by mouth once daily.      warfarin (COUMADIN) 5 MG tablet Take 1 tablet (5 mg total) by mouth Daily. 90 tablet 0    benzonatate (TESSALON) 100 MG capsule Take 1 capsule (100 mg total) by mouth 3 (three) times daily as needed for Cough. 30 capsule 1    promethazine (PHENERGAN) 6.25 mg/5 mL syrup Take 5 mLs (6.25 mg total) by mouth every 8 (eight) hours as needed (cough). 120 mL 0     No current facility-administered medications for this visit.       Review of patient's allergies indicates:   Allergen Reactions    Amlodipine Other (See Comments)     Leg swelling         Objective:       Last 3 sets of Vitals        1/24/2025     2:29 PM 1/29/2025     5:08 PM 2/4/2025     9:30 AM   Vitals - 1 value per visit   SYSTOLIC 134 189 115   DIASTOLIC 88 80 75   Pulse 93 92 69   Temp  99.7 °F (37.6 °C)    Resp  19    SPO2 96 % 92 % 98 %   Weight (lb) 157.41 154 149.8   Weight (kg) 71.4 69.854 67.95   Height 5' 6" (1.676 m) 5' 6" (1.676 m)    BMI (Calculated) 25.4 24.9    Pain Score Zero  Zero   Physical Exam  Constitutional:       General: She is not in acute distress.     Appearance: Normal appearance.   Eyes:      General: No scleral icterus.     Extraocular Movements: Extraocular movements intact.      Conjunctiva/sclera: Conjunctivae normal.   Neck:      Comments: No goiter.  Cardiovascular:      Rate and Rhythm: Normal rate and regular rhythm.      " Pulses: Normal pulses.      Heart sounds: Normal heart sounds.   Pulmonary:      Effort: Pulmonary effort is normal.      Breath sounds: Normal breath sounds. No wheezing or rales.      Comments: Cough with little congestion  Abdominal:      General: Bowel sounds are normal. There is no distension.      Palpations: Abdomen is soft.      Tenderness: There is no abdominal tenderness.   Musculoskeletal:         General: No swelling. Normal range of motion.   Lymphadenopathy:      Cervical: No cervical adenopathy.   Skin:     General: Skin is warm and dry.   Neurological:      General: No focal deficit present.      Mental Status: She is alert and oriented to person, place, and time.   Psychiatric:         Mood and Affect: Mood normal.         Behavior: Behavior normal.           CBC:  Recent Labs   Lab 08/13/24  0654 01/06/25  0832 01/29/25  1828   WBC 6.53 7.12 6.24   RBC 3.71 L 3.84 L 3.63 L   Hemoglobin 11.4 L 11.7 L 11.1 L   Hematocrit 34.2 L 36.4 L 32.6 L   Platelets 253 262 224   MCV 92 95 90   MCH 30.7 30.5 30.6   MCHC 33.3 32.1 34.0     CMP:  Recent Labs   Lab 08/13/24  0654 01/06/25  0832 01/29/25  1828 01/30/25  0425 01/31/25  0606   Glucose 92 89 90   < > 80   Calcium 9.9 9.7 9.0   < > 9.1   Albumin 3.9 4.0 4.0  --   --    Total Protein 7.1 7.7 7.7  --   --    Sodium 138 137 129 L   < > 133 L   Potassium 4.7 4.7 3.9   < > 3.7   CO2 22 L 22 L 19 L   < > 23   Chloride 105 105 100   < > 96   BUN 24 H 27 H 17   < > 31 H   Creatinine 1.4 1.4 1.2   < > 1.2   Alkaline Phosphatase 75 83 82  --   --    ALT 12 12 16  --   --    AST 17 19 22  --   --    Total Bilirubin 0.6 0.5 0.7  --   --     < > = values in this interval not displayed.     URINALYSIS:       LIPIDS:  Recent Labs   Lab 08/13/24  0654 08/13/24  0716 01/06/25  0832   TSH 1.576  --  1.643   HDL  --  48 48   Cholesterol  --  237 H 243 H   Triglycerides  --  172 H 140   LDL Cholesterol  --  154.6 167.0 H   HDL/Cholesterol Ratio  --  20.3 19.8 L   Non-HDL  Cholesterol  --  189 195   Total Cholesterol/HDL Ratio  --  4.9 5.1 H     TSH:  Recent Labs   Lab 08/13/24  0654 01/06/25  0832   TSH 1.576 1.643       A1C:  Recent Labs   Lab 08/13/24  0716 01/29/25  1828   Hemoglobin A1C 5.4 5.3       Imaging:  Echo    Left Ventricle: The left ventricle is normal in size. Normal wall   thickness. There is normal systolic function with a visually estimated   ejection fraction of 60 - 65%. There is normal diastolic function.    Right Ventricle: Normal right ventricular cavity size. Wall thickness   is normal. Systolic function is normal.    Left Atrium: Left atrium is severely dilated.    Right Atrium: Right atrium is severely dilated.    Mitral Valve: There is bileaflet sclerosis. Moderately calcified   subvalvular apparatus. There is mild regurgitation.    Tricuspid Valve: There is mild regurgitation.    Pulmonary Artery: The estimated pulmonary artery systolic pressure is   26 mmHg.    IVC/SVC: Normal venous pressure at 3 mmHg.      Assessment:       1. Pneumonia due to influenza    2. Primary hypertension    3. Stage 3b chronic kidney disease            Plan:       1. Pneumonia due to influenza  -     benzonatate (TESSALON) 100 MG capsule; Take 1 capsule (100 mg total) by mouth 3 (three) times daily as needed for Cough.  Dispense: 30 capsule; Refill: 1  -     promethazine (PHENERGAN) 6.25 mg/5 mL syrup; Take 5 mLs (6.25 mg total) by mouth every 8 (eight) hours as needed (cough).  Dispense: 120 mL; Refill: 0    2. Primary hypertension  Overview:  Changed Valsartan- HCTZ 320-25 mg daily to Valsartan 320mg and amlodipine 5mg daily but develop leg swelling.  Hydralazine 50 mg daily, will be increased to twice a day.      Orders:  -     CBC Auto Differential; Future; Expected date: 02/04/2025  -     Comprehensive Metabolic Panel; Future; Expected date: 02/04/2025    3. Stage 3b chronic kidney disease  Overview:  On valsartan 320mg day    Orders:  -     CBC Auto Differential; Future;  Expected date: 02/04/2025  -     Comprehensive Metabolic Panel; Future; Expected date: 02/04/2025       Assessment & Plan    > Assessed patient's recovery from recent hospitalization for suspected pneumonia  > Evaluated persistent cough, likely due to airway sensitivity post-infection  > Considered patient's stable kidney function despite recent diuretic use  > Assessed cardiovascular status, noting good heart function on recent echo  > Opted for conservative management with cough suppressants and expectorants  > Decided against prescribing inhalers due to absence of wheezing and good airflow    PNEUMONIA:  - Diagnosed pneumonia based on chest XR findings.  - Noted that oxygen levels were low in the emergency room.  - Explained that cough may persist for several weeks due to airway sensitivity.  - Discussed triggers for cough, including cold air and saliva.  - Instructed the patient to monitor for any worsening symptoms, including shortness of breath and wheezing when coughing.    RESPIRATORY SYMPTOMS:  - Auscultated lungs, which sound clear now, possibly indicating some remaining upper airway problems.  - Prescribed Tessalon Pearls 100mg, 1-2 tablets 3 times daily as needed for cough suppression.  - Prescribed promethazine syrup at night as needed for cough, with caution about potential drowsiness.  - Emphasized the importance of hydration for medication efficacy.    HYPERTENSION:  - Evaluated blood pressure, which is currently well-controlled.  - Continued current blood pressure medications without changes.  - swelling improved after discontinuation of the amlodipine    ANTICOAGULATION THERAPY:  - Continued Coumadin as adjusted by anticoagulation clinic.  - Ordered routine labs including INR.  - Planned to monitor kidney function and blood count due to Coumadin and blood pressure medications.    LABS:  - Ordered routine labs including vitamin D and PTH.    OTHER INSTRUCTIONS:  - Recommend rest and staying active  to avoid weakness.  - Sola to maintain adequate hydration.  - Sola to continue to stay active while ensuring sufficient rest.  - Assessed heart rate, which is within normal range.  - Reviewed recent labs, which showed favorable results.    FOLLOW UP:  - Scheduled follow up in 3 months.  - Advised to contact the office if symptoms worsen or new concerns arise.        Health Maintenance Due   Topic Date Due    RSV Vaccine (Age 60+ and Pregnant patients) (1 - 1-dose 75+ series) Never done      Transitional Care Note    Family and/or Caretaker present at visit?  Yes.  Diagnostic tests reviewed/disposition: No diagnosic tests pending after this hospitalization.  Disease/illness education: Symptomatic treatment.  Home health/community services discussion/referrals: Patient does not have home health established from hospital visit.  They do not need home health.  If needed, we will set up home health for the patient.   Establishment or re-establishment of referral orders for community resources: No other necessary community resources.   Discussion with other health care providers: No discussion with other health care providers necessary.        This includes face to face time and non-face to face time preparing to see the patient (eg, review of tests), obtaining and/or reviewing separately obtained history, documenting clinical information in the electronic or other health record, independently interpreting results and communicating results to the patient/family/caregiver, or care coordinator.     RETURN TO CLINIC IN: 3 MONTHS    FOR NEXT VISIT: BLOOD PRESSURE MONITORING, REVIEW LABS, and MEDICATION MONITORING       Rosy Rivera MD  [facility]  Disclaimer:  This note has been generated using voice-recognition software. There may be grammatical or spelling errors that have been missed during proof-reading

## 2025-02-05 ENCOUNTER — PATIENT MESSAGE (OUTPATIENT)
Dept: PRIMARY CARE CLINIC | Facility: CLINIC | Age: 89
End: 2025-02-05
Payer: MEDICARE

## 2025-02-05 NOTE — PROGRESS NOTES
[facility]    02/05/2025 7:56 AM    Assessment/Plan:    Patient presents today with supratherapeutic INR.    Assessment of patient findings and chart review: pt recently admitted with flu    Recommendation for patient's warfarin regimen: Held dose yesterday then resume current maintenance dose    Recommend repeat INR in 1 week  _________________________________________________________________    Sola Pitt (89 y.o.) is followed by the [facility].    Anticoagulation Summary  As of 2/4/2025      INR goal:  2.0-3.0   TTR:  76.5% (5.6 mo)   INR used for dosing:  3.6 (2/4/2025)   Warfarin maintenance plan:  2.5 mg (5 mg x 0.5) every Tue, Sat; 5 mg (5 mg x 1) all other days   Weekly warfarin total:  30 mg   Plan last modified:  Trinh Cordero, PharmD (9/16/2024)   Next INR check:  2/11/2025   Target end date:  --    Indications    Paroxysmal atrial fibrillation [I48.0]                 Anticoagulation Episode Summary       INR check location:  Anticoagulation Clinic    Preferred lab:  --    Send INR reminders to:  [facility] COUMADIN MONITORING POOL    Comments:  [facility]// pt not interested in meter          Anticoagulation Care Providers       Provider Role Specialty Phone number    Rosy Rivera MD [facility] [phone]

## 2025-02-06 ENCOUNTER — TELEPHONE (OUTPATIENT)
Dept: PRIMARY CARE CLINIC | Facility: CLINIC | Age: 89
End: 2025-02-06
Payer: MEDICARE

## 2025-02-07 ENCOUNTER — TELEPHONE (OUTPATIENT)
Dept: PRIMARY CARE CLINIC | Facility: CLINIC | Age: 89
End: 2025-02-07
Payer: MEDICARE

## 2025-02-07 DIAGNOSIS — G62.9 NEUROPATHY: ICD-10-CM

## 2025-02-07 DIAGNOSIS — N18.30 STAGE 3 CHRONIC KIDNEY DISEASE, UNSPECIFIED WHETHER STAGE 3A OR 3B CKD: ICD-10-CM

## 2025-02-07 DIAGNOSIS — J11.00 PNEUMONIA DUE TO INFLUENZA: Primary | ICD-10-CM

## 2025-02-11 ENCOUNTER — LAB VISIT (OUTPATIENT)
Dept: LAB | Facility: HOSPITAL | Age: 89
End: 2025-02-11
Attending: INTERNAL MEDICINE
Payer: MEDICARE

## 2025-02-11 ENCOUNTER — ANTI-COAG VISIT (OUTPATIENT)
Dept: CARDIOLOGY | Facility: CLINIC | Age: 89
End: 2025-02-11
Payer: MEDICARE

## 2025-02-11 ENCOUNTER — OFFICE VISIT (OUTPATIENT)
Dept: OPTOMETRY | Facility: CLINIC | Age: 89
End: 2025-02-11
Payer: MEDICARE

## 2025-02-11 DIAGNOSIS — H00.022 HORDEOLUM INTERNUM OF RIGHT LOWER EYELID: Primary | ICD-10-CM

## 2025-02-11 DIAGNOSIS — I48.0 PAROXYSMAL ATRIAL FIBRILLATION: ICD-10-CM

## 2025-02-11 DIAGNOSIS — I48.0 PAROXYSMAL ATRIAL FIBRILLATION: Primary | ICD-10-CM

## 2025-02-11 LAB
INR PPP: 4.3 (ref 0.8–1.2)
PROTHROMBIN TIME: 42.3 SEC (ref 9–12.5)

## 2025-02-11 PROCEDURE — 99213 OFFICE O/P EST LOW 20 MIN: CPT | Mod: PBBFAC,PO | Performed by: OPTOMETRIST

## 2025-02-11 PROCEDURE — 85610 PROTHROMBIN TIME: CPT | Performed by: INTERNAL MEDICINE

## 2025-02-11 PROCEDURE — 99203 OFFICE O/P NEW LOW 30 MIN: CPT | Mod: S$PBB,,, | Performed by: OPTOMETRIST

## 2025-02-11 PROCEDURE — 99999 PR PBB SHADOW E&M-EST. PATIENT-LVL III: CPT | Mod: PBBFAC,,, | Performed by: OPTOMETRIST

## 2025-02-11 PROCEDURE — 36415 COLL VENOUS BLD VENIPUNCTURE: CPT | Mod: PO | Performed by: INTERNAL MEDICINE

## 2025-02-11 RX ORDER — AMOXICILLIN AND CLAVULANATE POTASSIUM 500; 125 MG/1; MG/1
1 TABLET, FILM COATED ORAL 2 TIMES DAILY
Qty: 20 TABLET | Refills: 0 | Status: SHIPPED | OUTPATIENT
Start: 2025-02-11 | End: 2025-02-21

## 2025-02-11 NOTE — PROGRESS NOTES
2/11- Pt is to begin Augmentin for up to 10 days as prescribed by her ophthalmologist, and she wanted to know if there were any interactions.

## 2025-02-11 NOTE — PROGRESS NOTES
HPI    Patient states having possible stye RLL for the past 2 weeks. Has been   doing hot compresses several times a day. Has slight bloody discharge this   morning and has gotten more swollen. Also has a white bump on RUL. Was   prescribed eye ointment by PCP on 2/4 but hasn't used it in a while.   Last edited by Emily Bradford MA on 2/11/2025  2:48 PM.            Assessment /Plan     For exam results, see Encounter Report.    Hordeolum internum of right lower eyelid  -     amoxicillin-clavulanate 500-125mg (AUGMENTIN) 500-125 mg Tab; Take 1 tablet (500 mg total) by mouth 2 (two) times daily. for 10 days  Dispense: 20 tablet; Refill: 0          Daughter present for exam      Int kojo RLL  Small cyst RUL    PLAN:    1) Hot compresses, 5 minutes at a time, QID, along with AUGMENTIN BID PO X 7-10 days  2) Discussed chalazion formation  3) Pt will call if not resolved in one week, and will schedule excision.  Otherwise rtc for routine (pt on COUMADIN so if needs excision will need call from Dr Jess conner instructions for use prior to excision appt)

## 2025-02-11 NOTE — PROGRESS NOTES
[facility]    2025 1:56 PM    Assessment/Plan:    Patient presents today with supratherapeutic INR.    Assessment of patient findings and chart review: no changes reported per pt    Recommendation for patient's warfarin regimen: Hold dose for 2  days then decrease maintenance dose    Recommend repeat INR in 1 week  _________________________________________________________________    Sola Pitt (89 y.o.) is followed by the [facility].    Anticoagulation Summary  As of 2025      INR goal:  2.0-3.0   TTR:  73.4% (5.8 mo)   INR used for dosin.3 (2025)   Warfarin maintenance plan:  2.5 mg (5 mg x 0.5) every Tue, Thu, Sat; 5 mg (5 mg x 1) all other days   Weekly warfarin total:  27.5 mg   Plan last modified:  Trinh Cordero, PharmD (2025)   Next INR check:  2025   Target end date:  --    Indications    Paroxysmal atrial fibrillation [I48.0]                 Anticoagulation Episode Summary       INR check location:  Anticoagulation Clinic    Preferred lab:  --    Send INR reminders to:  [facility] COUMADIN MONITORING POOL    Comments:  [facility]// pt not interested in meter          Anticoagulation Care Providers       Provider Role Specialty Phone number    Rosy Rivera MD [facility] [phone]

## 2025-02-18 ENCOUNTER — PATIENT MESSAGE (OUTPATIENT)
Dept: CARDIOLOGY | Facility: CLINIC | Age: 89
End: 2025-02-18

## 2025-02-18 ENCOUNTER — LAB VISIT (OUTPATIENT)
Dept: LAB | Facility: HOSPITAL | Age: 89
End: 2025-02-18
Attending: INTERNAL MEDICINE
Payer: MEDICARE

## 2025-02-18 ENCOUNTER — ANTI-COAG VISIT (OUTPATIENT)
Dept: CARDIOLOGY | Facility: CLINIC | Age: 89
End: 2025-02-18
Payer: MEDICARE

## 2025-02-18 DIAGNOSIS — I48.0 PAROXYSMAL ATRIAL FIBRILLATION: Primary | ICD-10-CM

## 2025-02-18 DIAGNOSIS — I48.0 PAROXYSMAL ATRIAL FIBRILLATION: ICD-10-CM

## 2025-02-18 LAB
INR PPP: 2.1 (ref 0.8–1.2)
PROTHROMBIN TIME: 21.9 SEC (ref 9–12.5)

## 2025-02-18 PROCEDURE — 36415 COLL VENOUS BLD VENIPUNCTURE: CPT | Mod: PO | Performed by: INTERNAL MEDICINE

## 2025-02-18 PROCEDURE — 85610 PROTHROMBIN TIME: CPT | Performed by: INTERNAL MEDICINE

## 2025-02-18 NOTE — PROGRESS NOTES
[facility]    2025 1:51 PM    Assessment/Plan:    Patient presents today with therapeutic INR.    Recommendation for patient's warfarin regimen: Decrease maintenance dose    Recommend repeat INR in 2 weeks  _________________________________________________________________    Sola Sweetie (89 y.o.) is followed by the [facility].    Anticoagulation Summary  As of 2025      INR goal:  2.0-3.0   TTR:  72.1% (6 mo)   INR used for dosin.1 (2025)   Warfarin maintenance plan:  5 mg (5 mg x 1) every Mon, Wed, Fri; 2.5 mg (5 mg x 0.5) all other days   Weekly warfarin total:  25 mg   Plan last modified:  Trinh Cordero, PharmD (2025)   Next INR check:  3/3/2025   Target end date:  --    Indications    Paroxysmal atrial fibrillation [I48.0]                 Anticoagulation Episode Summary       INR check location:  Anticoagulation Clinic    Preferred lab:  --    Send INR reminders to:  [facility] COUMADIN MONITORING POOL    Comments:  [facility] Lab// pt not interested in meter          Anticoagulation Care Providers       Provider Role Specialty Phone number    Rosy Rivera MD [facility] [phone]

## 2025-03-03 ENCOUNTER — ANTI-COAG VISIT (OUTPATIENT)
Dept: CARDIOLOGY | Facility: CLINIC | Age: 89
End: 2025-03-03
Payer: MEDICARE

## 2025-03-03 ENCOUNTER — LAB VISIT (OUTPATIENT)
Dept: LAB | Facility: HOSPITAL | Age: 89
End: 2025-03-03
Attending: INTERNAL MEDICINE
Payer: MEDICARE

## 2025-03-03 DIAGNOSIS — I48.0 PAROXYSMAL ATRIAL FIBRILLATION: ICD-10-CM

## 2025-03-03 DIAGNOSIS — I48.0 PAROXYSMAL ATRIAL FIBRILLATION: Primary | ICD-10-CM

## 2025-03-03 LAB
INR PPP: 2.4 (ref 0.8–1.2)
PROTHROMBIN TIME: 24.8 SEC (ref 9–12.5)

## 2025-03-03 PROCEDURE — 85610 PROTHROMBIN TIME: CPT | Performed by: INTERNAL MEDICINE

## 2025-03-03 PROCEDURE — 36415 COLL VENOUS BLD VENIPUNCTURE: CPT | Mod: PO | Performed by: INTERNAL MEDICINE

## 2025-03-03 PROCEDURE — 93793 ANTICOAG MGMT PT WARFARIN: CPT | Mod: ,,,

## 2025-03-03 NOTE — PROGRESS NOTES
[facility]    2025 2:01 PM    Assessment/Plan:    Patient presents today with therapeutic INR.    Recommendation for patient's warfarin regimen: Continue current maintenance dose    Recommend repeat INR in 3 weeks  _________________________________________________________________    Sola Pitt (89 y.o.) is followed by the [facility].    Anticoagulation Summary  As of 3/3/2025      INR goal:  2.0-3.0   TTR:  74.0% (6.5 mo)   INR used for dosin.4 (3/3/2025)   Warfarin maintenance plan:  5 mg (5 mg x 1) every Mon, Wed, Fri; 2.5 mg (5 mg x 0.5) all other days   Weekly warfarin total:  25 mg   No change documented:  Trinh Cordero PharmD   Plan last modified:  Trinh Cordero PharmD (2025)   Next INR check:  3/24/2025   Target end date:  --    Indications    Paroxysmal atrial fibrillation [I48.0]                 Anticoagulation Episode Summary       INR check location:  Anticoagulation Clinic    Preferred lab:  --    Send INR reminders to:  [facility] COUMADIN MONITORING POOL    Comments:  [facility]// pt not interested in meter          Anticoagulation Care Providers       Provider Role Specialty Phone number    Rosy Rivera MD [facility] [phone]

## 2025-03-18 DIAGNOSIS — I10 PRIMARY HYPERTENSION: ICD-10-CM

## 2025-03-18 NOTE — TELEPHONE ENCOUNTER
hydrALAZINE (APRESOLINE) 50 MG tablet -- -- 1/24/2025 -- No   Sig - Route: Take 1 tablet (50 mg total) by mouth every 12 (twelve) hours. - Oral   Class: No Print

## 2025-03-19 RX ORDER — HYDRALAZINE HYDROCHLORIDE 50 MG/1
50 TABLET, FILM COATED ORAL 2 TIMES DAILY
Qty: 180 TABLET | Refills: 0 | Status: SHIPPED | OUTPATIENT
Start: 2025-03-19

## 2025-03-24 ENCOUNTER — LAB VISIT (OUTPATIENT)
Dept: LAB | Facility: HOSPITAL | Age: 89
End: 2025-03-24
Attending: INTERNAL MEDICINE
Payer: MEDICARE

## 2025-03-24 DIAGNOSIS — I48.0 PAROXYSMAL ATRIAL FIBRILLATION: ICD-10-CM

## 2025-03-24 LAB
INR PPP: 1.9 (ref 0.8–1.2)
PROTHROMBIN TIME: 19.8 SECONDS (ref 9–12.5)

## 2025-03-24 PROCEDURE — 85610 PROTHROMBIN TIME: CPT

## 2025-03-24 PROCEDURE — 36415 COLL VENOUS BLD VENIPUNCTURE: CPT | Mod: PO

## 2025-03-25 ENCOUNTER — PATIENT MESSAGE (OUTPATIENT)
Dept: CARDIOLOGY | Facility: CLINIC | Age: 89
End: 2025-03-25

## 2025-03-25 ENCOUNTER — ANTI-COAG VISIT (OUTPATIENT)
Dept: CARDIOLOGY | Facility: CLINIC | Age: 89
End: 2025-03-25
Payer: MEDICARE

## 2025-03-25 DIAGNOSIS — I48.0 PAROXYSMAL ATRIAL FIBRILLATION: Primary | ICD-10-CM

## 2025-03-25 PROCEDURE — 93793 ANTICOAG MGMT PT WARFARIN: CPT | Mod: ,,,

## 2025-03-25 NOTE — PROGRESS NOTES
[facility]    2025 8:31 AM    Assessment/Plan:    Patient presents today with slightly subtherapeutic  INR.    Assessment of patient findings and chart review: no changes reported per pt    Recommendation for patient's warfarin regimen: Boost dose today to 5mg then resume current maintenance dose    Recommend repeat INR in 1 week  _________________________________________________________________    Sola Pitt (89 y.o.) is followed by the [facility].    Anticoagulation Summary  As of 3/25/2025      INR goal:  2.0-3.0   TTR:  74.6% (7.2 mo)   INR used for dosin.9 (3/24/2025)   Warfarin maintenance plan:  5 mg (5 mg x 1) every Mon, Wed, Fri; 2.5 mg (5 mg x 0.5) all other days   Weekly warfarin total:  25 mg   Plan last modified:  Trinh Cordero, PharmD (2025)   Next INR check:  3/31/2025   Target end date:  --    Indications    Paroxysmal atrial fibrillation [I48.0]                 Anticoagulation Episode Summary       INR check location:  Anticoagulation Clinic    Preferred lab:  --    Send INR reminders to:  [facility] COUMADIN MONITORING POOL    Comments:  [facility] Lab// pt not interested in meter          Anticoagulation Care Providers       Provider Role Specialty Phone number    Rosy Rivera MD [facility] Medicine [phone]

## 2025-04-01 ENCOUNTER — DOCUMENT SCAN (OUTPATIENT)
Dept: HOME HEALTH SERVICES | Facility: HOSPITAL | Age: 89
End: 2025-04-01
Payer: MEDICARE

## 2025-04-01 ENCOUNTER — LAB VISIT (OUTPATIENT)
Dept: LAB | Facility: HOSPITAL | Age: 89
End: 2025-04-01
Attending: INTERNAL MEDICINE
Payer: MEDICARE

## 2025-04-01 DIAGNOSIS — I48.0 PAROXYSMAL ATRIAL FIBRILLATION: ICD-10-CM

## 2025-04-01 LAB
INR PPP: 2.2 (ref 0.8–1.2)
PROTHROMBIN TIME: 22.4 SECONDS (ref 9–12.5)

## 2025-04-01 PROCEDURE — 85610 PROTHROMBIN TIME: CPT

## 2025-04-01 PROCEDURE — 36415 COLL VENOUS BLD VENIPUNCTURE: CPT | Mod: PO

## 2025-04-02 ENCOUNTER — ANTI-COAG VISIT (OUTPATIENT)
Dept: CARDIOLOGY | Facility: CLINIC | Age: 89
End: 2025-04-02
Payer: MEDICARE

## 2025-04-02 ENCOUNTER — PATIENT MESSAGE (OUTPATIENT)
Dept: CARDIOLOGY | Facility: CLINIC | Age: 89
End: 2025-04-02

## 2025-04-02 DIAGNOSIS — I48.0 PAROXYSMAL ATRIAL FIBRILLATION: Primary | ICD-10-CM

## 2025-04-02 PROCEDURE — 93793 ANTICOAG MGMT PT WARFARIN: CPT | Mod: ,,,

## 2025-04-02 NOTE — PROGRESS NOTES
Patient called was given lab result,  she verified correct coumadin dose, reports no changes, would like next lab appointment on a Monday

## 2025-04-02 NOTE — PROGRESS NOTES
[facility]    2025 1:10 PM    Assessment/Plan:    Patient presents today with therapeutic INR.    Assessment of patient findings and chart review: Reviewed    Recommendation for patient's warfarin regimen: Continue current maintenance dose    Recommend repeat INR in 2 weeks  _________________________________________________________________    Sola Pitt (89 y.o.) is followed by the [facility].    Anticoagulation Summary  As of 2025      INR goal:  2.0-3.0   TTR:  74.3% (7.4 mo)   INR used for dosin.2 (2025)   Warfarin maintenance plan:  5 mg (5 mg x 1) every Mon, Wed, Fri; 2.5 mg (5 mg x 0.5) all other days   Weekly warfarin total:  25 mg   Plan last modified:  Trinh Cordero, PharmD (2025)   Next INR check:  4/15/2025   Target end date:  --    Indications    Paroxysmal atrial fibrillation [I48.0]                 Anticoagulation Episode Summary       INR check location:  Anticoagulation Clinic    Preferred lab:  --    Send INR reminders to:  [facility] COUMADIN MONITORING POOL    Comments:  [facility]atyana Lab// pt not interested in meter          Anticoagulation Care Providers       Provider Role Specialty Phone number    Rosy Rivera MD [facility] [phone]

## 2025-04-07 ENCOUNTER — EXTERNAL HOME HEALTH (OUTPATIENT)
Dept: HOME HEALTH SERVICES | Facility: HOSPITAL | Age: 89
End: 2025-04-07
Payer: MEDICARE

## 2025-04-14 ENCOUNTER — ANTI-COAG VISIT (OUTPATIENT)
Dept: CARDIOLOGY | Facility: CLINIC | Age: 89
End: 2025-04-14
Payer: MEDICARE

## 2025-04-14 ENCOUNTER — LAB VISIT (OUTPATIENT)
Dept: LAB | Facility: HOSPITAL | Age: 89
End: 2025-04-14
Attending: INTERNAL MEDICINE
Payer: MEDICARE

## 2025-04-14 DIAGNOSIS — I48.0 PAROXYSMAL ATRIAL FIBRILLATION: Primary | ICD-10-CM

## 2025-04-14 DIAGNOSIS — I48.0 PAROXYSMAL ATRIAL FIBRILLATION: ICD-10-CM

## 2025-04-14 LAB
INR PPP: 2 (ref 0.8–1.2)
PROTHROMBIN TIME: 20.9 SECONDS (ref 9–12.5)

## 2025-04-14 PROCEDURE — 36415 COLL VENOUS BLD VENIPUNCTURE: CPT | Mod: PO

## 2025-04-14 PROCEDURE — 93793 ANTICOAG MGMT PT WARFARIN: CPT | Mod: ,,,

## 2025-04-14 PROCEDURE — 85610 PROTHROMBIN TIME: CPT

## 2025-04-14 NOTE — PROGRESS NOTES
[facility]    2025 1:35 PM    Assessment/Plan:    Patient presents today with therapeutic INR.    Assessment of patient findings and chart review: reviewed    Recommendation for patient's warfarin regimen: Continue current maintenance dose    Recommend repeat INR in 2 weeks  _________________________________________________________________    Sola Pitt (89 y.o.) is followed by the [facility].    Anticoagulation Summary  As of 2025      INR goal:  2.0-3.0   TTR:  75.7% (7.9 mo)   INR used for dosin.0 (2025)   Warfarin maintenance plan:  5 mg (5 mg x 1) every Mon, Wed, Fri; 2.5 mg (5 mg x 0.5) all other days   Weekly warfarin total:  25 mg   Plan last modified:  Trinh Cordero, PharmD (2025)   Next INR check:  2025   Target end date:  --    Indications    Paroxysmal atrial fibrillation [I48.0]                 Anticoagulation Episode Summary       INR check location:  Anticoagulation Clinic    Preferred lab:  --    Send INR reminders to:  [facility] COUMADIN MONITORING POOL    Comments:  [facility]// pt not interested in meter          Anticoagulation Care Providers       Provider Role Specialty Phone number    Rosy Rivera MD [facility] [phone]

## 2025-04-21 RX ORDER — WARFARIN SODIUM 5 MG/1
5 TABLET ORAL
Qty: 90 TABLET | Refills: 0 | Status: SHIPPED | OUTPATIENT
Start: 2025-04-21

## 2025-04-23 ENCOUNTER — DOCUMENT SCAN (OUTPATIENT)
Dept: HOME HEALTH SERVICES | Facility: HOSPITAL | Age: 89
End: 2025-04-23
Payer: MEDICARE

## 2025-04-28 ENCOUNTER — ANTI-COAG VISIT (OUTPATIENT)
Dept: CARDIOLOGY | Facility: CLINIC | Age: 89
End: 2025-04-28
Payer: MEDICARE

## 2025-04-28 ENCOUNTER — LAB VISIT (OUTPATIENT)
Dept: LAB | Facility: HOSPITAL | Age: 89
End: 2025-04-28
Attending: INTERNAL MEDICINE
Payer: MEDICARE

## 2025-04-28 DIAGNOSIS — I48.0 PAROXYSMAL ATRIAL FIBRILLATION: ICD-10-CM

## 2025-04-28 DIAGNOSIS — I48.0 PAROXYSMAL ATRIAL FIBRILLATION: Primary | ICD-10-CM

## 2025-04-28 LAB
INR PPP: 1.8 (ref 0.8–1.2)
PROTHROMBIN TIME: 18.8 SECONDS (ref 9–12.5)

## 2025-04-28 PROCEDURE — 36415 COLL VENOUS BLD VENIPUNCTURE: CPT | Mod: PO

## 2025-04-28 PROCEDURE — 93793 ANTICOAG MGMT PT WARFARIN: CPT | Mod: ,,,

## 2025-04-28 PROCEDURE — 85610 PROTHROMBIN TIME: CPT

## 2025-04-29 ENCOUNTER — PATIENT MESSAGE (OUTPATIENT)
Dept: CARDIOLOGY | Facility: CLINIC | Age: 89
End: 2025-04-29
Payer: MEDICARE

## 2025-04-29 NOTE — PROGRESS NOTES
[facility]    2025 9:29 AM    Assessment/Plan:    Patient presents today with subtherapeutic  INR.    Assessment of patient findings and chart review: INR results from . Pt confirms correct dosing. She states she will start eating vitamin K foods 2x weekly starting today.     Recommendation for patient's warfarin regimen: Boost dose today to 5mg then increase maintenance dose    Recommend repeat INR in 2 weeks  _________________________________________________________________    Sola Pitt (89 y.o.) is followed by the [facility].    Anticoagulation Summary  As of 2025      INR goal:  2.0-3.0   TTR:  71.5% (8.3 mo)   INR used for dosin.8 (2025)   Warfarin maintenance plan:  2.5 mg (5 mg x 0.5) every Sun, Wed, Sat; 5 mg (5 mg x 1) all other days   Weekly warfarin total:  27.5 mg   Plan last modified:  Britton Sweet, PharmD (2025)   Next INR check:  2025   Target end date:  --    Indications    Paroxysmal atrial fibrillation [I48.0]                 Anticoagulation Episode Summary       INR check location:  Anticoagulation Clinic    Preferred lab:  --    Send INR reminders to:  [facility] COUMADIN MONITORING POOL    Comments:  [facility]// pt not interested in meter          Anticoagulation Care Providers       Provider Role Specialty Phone number    Rosy Rivera MD [facility] [phone]

## 2025-05-02 ENCOUNTER — LAB VISIT (OUTPATIENT)
Dept: LAB | Facility: HOSPITAL | Age: 89
End: 2025-05-02
Attending: INTERNAL MEDICINE
Payer: MEDICARE

## 2025-05-02 DIAGNOSIS — I10 PRIMARY HYPERTENSION: ICD-10-CM

## 2025-05-02 DIAGNOSIS — N18.32 STAGE 3B CHRONIC KIDNEY DISEASE: ICD-10-CM

## 2025-05-02 LAB
ALBUMIN SERPL BCP-MCNC: 3.9 G/DL (ref 3.5–5.2)
ALP SERPL-CCNC: 80 UNIT/L (ref 40–150)
ALT SERPL W/O P-5'-P-CCNC: 13 UNIT/L (ref 10–44)
ANION GAP (OHS): 10 MMOL/L (ref 8–16)
AST SERPL-CCNC: 20 UNIT/L (ref 11–45)
BILIRUB SERPL-MCNC: 0.5 MG/DL (ref 0.1–1)
BUN SERPL-MCNC: 16 MG/DL (ref 8–23)
CALCIUM SERPL-MCNC: 9.3 MG/DL (ref 8.7–10.5)
CHLORIDE SERPL-SCNC: 107 MMOL/L (ref 95–110)
CO2 SERPL-SCNC: 24 MMOL/L (ref 23–29)
CREAT SERPL-MCNC: 1 MG/DL (ref 0.5–1.4)
GFR SERPLBLD CREATININE-BSD FMLA CKD-EPI: 54 ML/MIN/1.73/M2
GLUCOSE SERPL-MCNC: 88 MG/DL (ref 70–110)
POTASSIUM SERPL-SCNC: 4.7 MMOL/L (ref 3.5–5.1)
PROT SERPL-MCNC: 7.2 GM/DL (ref 6–8.4)
SODIUM SERPL-SCNC: 141 MMOL/L (ref 136–145)

## 2025-05-02 PROCEDURE — 36415 COLL VENOUS BLD VENIPUNCTURE: CPT | Mod: PO

## 2025-05-02 PROCEDURE — 80053 COMPREHEN METABOLIC PANEL: CPT

## 2025-05-05 ENCOUNTER — TELEPHONE (OUTPATIENT)
Dept: PRIMARY CARE CLINIC | Facility: CLINIC | Age: 89
End: 2025-05-05
Payer: MEDICARE

## 2025-05-05 ENCOUNTER — RESULTS FOLLOW-UP (OUTPATIENT)
Dept: PRIMARY CARE CLINIC | Facility: CLINIC | Age: 89
End: 2025-05-05

## 2025-05-05 DIAGNOSIS — N18.30 STAGE 3 CHRONIC KIDNEY DISEASE, UNSPECIFIED WHETHER STAGE 3A OR 3B CKD: Primary | ICD-10-CM

## 2025-05-05 DIAGNOSIS — D64.9 CHRONIC ANEMIA: ICD-10-CM

## 2025-05-05 NOTE — TELEPHONE ENCOUNTER
----- Message from Krystian Stas sent at 5/3/2025  5:21 AM CDT -----  Regarding: Client Services  Contact: [phone]  Good Morning, My name is Stas Henning, I work in the Lab Client Services. We had a problem with some lab work on this patient. If someone from your office could call us at [phone] or [phone] that would be great. Anyone in my department can help.  Thank you,

## 2025-05-06 ENCOUNTER — OFFICE VISIT (OUTPATIENT)
Dept: PRIMARY CARE CLINIC | Facility: CLINIC | Age: 89
End: 2025-05-06
Payer: MEDICARE

## 2025-05-06 VITALS
OXYGEN SATURATION: 97 % | WEIGHT: 152.69 LBS | DIASTOLIC BLOOD PRESSURE: 82 MMHG | SYSTOLIC BLOOD PRESSURE: 114 MMHG | HEART RATE: 68 BPM | HEIGHT: 66 IN | BODY MASS INDEX: 24.54 KG/M2

## 2025-05-06 DIAGNOSIS — I48.0 PAROXYSMAL ATRIAL FIBRILLATION: ICD-10-CM

## 2025-05-06 DIAGNOSIS — E55.9 VITAMIN D DEFICIENCY: ICD-10-CM

## 2025-05-06 DIAGNOSIS — I10 PRIMARY HYPERTENSION: Primary | ICD-10-CM

## 2025-05-06 DIAGNOSIS — E03.9 HYPOTHYROIDISM, UNSPECIFIED TYPE: ICD-10-CM

## 2025-05-06 DIAGNOSIS — N18.32 STAGE 3B CHRONIC KIDNEY DISEASE: ICD-10-CM

## 2025-05-06 DIAGNOSIS — M80.00XS AGE-RELATED OSTEOPOROSIS WITH CURRENT PATHOLOGICAL FRACTURE, SEQUELA: ICD-10-CM

## 2025-05-06 DIAGNOSIS — G62.9 NEUROPATHY: ICD-10-CM

## 2025-05-06 PROCEDURE — 99999 PR PBB SHADOW E&M-EST. PATIENT-LVL III: CPT | Mod: PBBFAC,,, | Performed by: INTERNAL MEDICINE

## 2025-05-06 PROCEDURE — 99213 OFFICE O/P EST LOW 20 MIN: CPT | Mod: PBBFAC,PN | Performed by: INTERNAL MEDICINE

## 2025-05-06 NOTE — PROGRESS NOTES
Subjective:       Patient ID: Sola Pitt is a 89 y.o. female.    Chief Complaint: Hypertension, Results, and Medication Management      HPI  Sola Pitt is a 89 y.o. female with hypertension, PAF, PVD, neuropathy, CKD 3, hypothyroid who presents today for Hypertension, Results, and Medication Management    Sola presents today for follow up of lab results.    Reports feeling well.  Has had no falls. Continues physical therapy weekly.    Mood has been good. Denies evidence of bleeding.    LABS:  Coumadin level was slightly under therapeutic range. Kidney function tests show improvement with values approaching normal range. Potassium and calcium levels are within normal limits. Vitamin D levels are at goal. PTH is elevated.    CURRENT MEDICATIONS:  She is currently taking Vitamin D supplements, Gabapentin at night, Hydralazine twice daily, Metoprolol, and thyroid medication. She denies taking calcium supplements.    CURRENT SYMPTOMS:  She reports mild leg swelling in the evenings, particularly noticeable in the afternoon hours.    ACTIVITIES OF DAILY LIVING:  She resides in assisted living and is independent with medication management, preparing them weekly. She is independent with dressing and personal care, including daily showering. She uses a cane for walking outside her apartment but ambulates independently within her apartment, keeping the cane nearby.    EXERCISE:  She exercises regularly, attending physical therapy once weekly and exercising four days weekly at her residence.    DIET AND SLEEP:  She reports sleeping well but notes poor appetite due to overeating.      ROS:  General: -fever, -chills, -fatigue, -weight gain, -weight loss, +increased appetite  Eyes: -vision changes, -redness, -discharge  ENT: -ear pain, -nasal congestion, -sore throat  Cardiovascular: -chest pain, -palpitations, +lower extremity edema  Respiratory: -cough, -shortness of breath  Gastrointestinal: -abdominal pain,  -nausea, -vomiting, -diarrhea, -constipation, -blood in stool  Genitourinary: -dysuria, -hematuria, -frequency  Musculoskeletal: -joint pain, -muscle pain  Skin: -rash, -lesion  Neurological: -headache, -dizziness, -numbness, -tingling  Psychiatric: -anxiety, -depression, -sleep difficulty        4Ms for Medical Decision-Making in Older Adults    Last Completed EAWV:  None    MEDICATIONS:  High Risk Medications:  Total Active Medications: 1  gabapentin - 100 MG    MOBILITY:  Activities of Daily Livin/7/2025     1:05 AM   Activities of Daily Living   Ambulation Independent   Dressing Independent   Transfers Independent   Toileting Continent of bladder   Feeding Independent   Cleaning home/Chores Assistance Required   Telephone use Independent   Shopping Independent   Taking meds Independent     Fall Risk:      2025     9:40 AM 2025     3:15 PM 2025     9:40 AM   Fall Risk Assessment - Outpatient   Mobility Status Ambulatory Ambulatory w/ assistance Ambulatory   Number of falls 0 1 0   Identified as fall risk False True False     Disability Status:      2025    10:13 AM   Disability Status   Are you deaf or do you have serious difficulty hearing? N   Are you blind or do you have serious difficulty seeing, even when wearing glasses? N   Because of a physical, mental, or emotional condition, do you have serious difficulty concentrating, remembering, or making decisions? N   Do you have serious difficulty walking or climbing stairs? N   Do you have difficulty dressing or bathing? N   Because of a physical, mental, or emotional condition, do you have difficulty doing errands alone such as visiting a doctor's office or shopping? N     Nutrition Screenin/6/2025    10:12 AM   Nutrition Screening   Has food intake declined over the past three months due to loss of appetite, digestive problems, chewing or swallowing difficulties? No decrease in food intake   Involuntary weight loss during the  last 3 months? No weight loss   Mobility? Goes out   Has the patient suffered psychological stress or acute disease in the past three months? No   Neuropsychological problems? No psychological problems   Body Mass Index (BMI)?  BMI 23 or greater   Screening Score 14   Interpretation Normal nutritional status    Screening Score: 0-7 Malnourished, 8-11 At Risk, 12-14 Normal  Get Up and Go:       No data to display              Whisper Test:       No data to display                    MENTATION:   Has Dementia Dx: No  Has Anxiety Dx: No    Depression Patient Health Questionnaire:      1/13/2025    11:05 AM   Depression Patient Health Questionnaire   Over the last two weeks how often have you been bothered by little interest or pleasure in doing things Not at all   Over the last two weeks how often have you been bothered by feeling down, depressed or hopeless Not at all   PHQ-2 Total Score 0     Cognitive Function Screening:       No data to display              Cognitive Function Screening Total - Less than 4 = Abnormal,  Greater than or equal to 4 = Normal        WHAT MATTERS MOST:  Advance Care Planning   ACP Status:   Patient has had an ACP conversation  Living Will: Yes  Power of : Yes  La[place]: No    What is most important right now is to focus on avoiding the hospital    Accordingly, we have decided that the best plan to meet the patient's goals includes continuing with treatment      What matters most to patient today is:   Quality of life.             Past Medical History:   Diagnosis Date    Atrial fibrillation     Cataract     Cellulitis     Hypertension     Thyroid disease        Past Surgical History:   Procedure Laterality Date    APPENDECTOMY      CATARACT EXTRACTION Bilateral     HIP FRACTURE SURGERY  2024    HYSTERECTOMY, VAGINAL, WITH SALPINGO-OOPHORECTOMY      KNEE ARTHROSCOPY Bilateral     MULTIPLE TOOTH EXTRACTIONS      TONSILLECTOMY         Family History   Problem Relation Name Age of Onset  "   Cancer Mother  66        Liver cancer    Hypertension Father      Stroke Father  59       Social History     Socioeconomic History    Marital status:    Tobacco Use    Smoking status: Never     Passive exposure: Never    Smokeless tobacco: Never   Substance and Sexual Activity    Alcohol use: Yes     Comment: Occasional    Drug use: Never    Sexual activity: Not Currently     Social Drivers of Health     Financial Resource Strain: Low Risk  (1/30/2025)    Overall Financial Resource Strain (CARDIA)     Difficulty of Paying Living Expenses: Not hard at all   Food Insecurity: No Food Insecurity (1/30/2025)    Hunger Vital Sign     Worried About Running Out of Food in the Last Year: Never true     Ran Out of Food in the Last Year: Never true   Transportation Needs: No Transportation Needs (1/30/2025)    TRANSPORTATION NEEDS     Transportation : No   Stress: No Stress Concern Present (1/30/2025)    [employer] - Occupational Stress Questionnaire     Feeling of Stress : Not at all   Housing Stability: Unknown (1/30/2025)    Housing Stability Vital Sign     Unable to Pay for Housing in the Last Year: No     Homeless in the Last Year: No       Current Medications[1]    Review of patient's allergies indicates:   Allergen Reactions    Amlodipine Other (See Comments)     Leg swelling         Objective:       Last 3 sets of Vitals        2/4/2025     9:30 AM 2/11/2025     2:48 PM 5/6/2025     9:33 AM   Vitals - 1 value per visit   SYSTOLIC 115  114   DIASTOLIC 75  82   Pulse 69  68   SPO2 98 %  97 %   Weight (lb) 149.8  152.67   Weight (kg) 67.95  69.25   Height   5' 6" (1.676 m)   BMI (Calculated)   24.7   Pain Score Zero Zero Zero   Physical Exam  Constitutional:       General: She is not in acute distress.     Appearance: Normal appearance.   Eyes:      General: No scleral icterus.     Extraocular Movements: Extraocular movements intact.      Conjunctiva/sclera: Conjunctivae normal. "   Neck:      Comments: No goiter.  Cardiovascular:      Rate and Rhythm: Normal rate and regular rhythm.      Pulses: Normal pulses.      Heart sounds: Normal heart sounds.   Pulmonary:      Effort: Pulmonary effort is normal.      Breath sounds: Normal breath sounds. No wheezing or rales.   Abdominal:      General: Bowel sounds are normal. There is no distension.      Palpations: Abdomen is soft.      Tenderness: There is no abdominal tenderness.   Musculoskeletal:         General: No swelling. Normal range of motion.   Lymphadenopathy:      Cervical: No cervical adenopathy.   Skin:     General: Skin is warm and dry.   Neurological:      General: No focal deficit present.      Mental Status: She is alert and oriented to person, place, and time.   Psychiatric:         Mood and Affect: Mood normal.         Behavior: Behavior normal.           CBC:  Recent Labs   Lab 08/13/24  0654 01/06/25  0832 01/29/25  1828   WBC 6.53 7.12 6.24   RBC 3.71 L 3.84 L 3.63 L   Hemoglobin 11.4 L 11.7 L 11.1 L   Hematocrit 34.2 L 36.4 L 32.6 L   Platelets 253 262 224   MCV 92 95 90   MCH 30.7 30.5 30.6   MCHC 33.3 32.1 34.0     CMP:  Recent Labs   Lab 01/06/25  0832 01/29/25  1828 01/30/25  0425 05/02/25  0745   Glucose 89 90   < > 88   Calcium 9.7 9.0   < > 9.3   Albumin 4.0 4.0  --  3.9   Protein Total  --   --   --  7.2   Total Protein 7.7 7.7  --   --    Sodium 137 129 L   < > 141   Potassium 4.7 3.9   < > 4.7   CO2 22 L 19 L   < > 24   Chloride 105 100   < > 107   BUN 27 H 17   < > 16   Creatinine 1.4 1.2   < > 1.0   Alkaline Phosphatase 83 82  --   --    ALP  --   --   --  80   ALT 12 16  --  13   AST 19 22  --  20   Total Bilirubin 0.5 0.7  --   --    Bilirubin Total  --   --   --  0.5    < > = values in this interval not displayed.     URINALYSIS:       LIPIDS:  Recent Labs   Lab 08/13/24  0654 08/13/24  0716 01/06/25  0832   TSH 1.576  --  1.643   HDL  --  48 48   Cholesterol  --  237 H 243 H   Triglycerides  --  172 H 140    LDL Cholesterol  --  154.6 167.0 H   HDL/Cholesterol Ratio  --  20.3 19.8 L   Non-HDL Cholesterol  --  189 195   Total Cholesterol/HDL Ratio  --  4.9 5.1 H     TSH:  Recent Labs   Lab 08/13/24  0654 01/06/25  0832   TSH 1.576 1.643       A1C:  Recent Labs   Lab 08/13/24  0716 01/29/25  1828   Hemoglobin A1C 5.4 5.3       Imaging:  Echo    Left Ventricle: The left ventricle is normal in size. Normal wall   thickness. There is normal systolic function with a visually estimated   ejection fraction of 60 - 65%. There is normal diastolic function.    Right Ventricle: Normal right ventricular cavity size. Wall thickness   is normal. Systolic function is normal.    Left Atrium: Left atrium is severely dilated.    Right Atrium: Right atrium is severely dilated.    Mitral Valve: There is bileaflet sclerosis. Moderately calcified   subvalvular apparatus. There is mild regurgitation.    Tricuspid Valve: There is mild regurgitation.    Pulmonary Artery: The estimated pulmonary artery systolic pressure is   26 mmHg.    IVC/SVC: Normal venous pressure at 3 mmHg.      Assessment:       1. Primary hypertension    2. Paroxysmal atrial fibrillation    3. Stage 3b chronic kidney disease    4. Hypothyroidism, unspecified type    5. Vitamin D deficiency    6. Age-related osteoporosis with current pathological fracture, sequela    7. Neuropathy            Plan:       1. Primary hypertension  Overview:  On Valsartan 320mg and Hydralazine 50 mg daily, will be increased to twice a day.      Assessment & Plan:  - Blood pressure is well-controlled and within target range.  - Leg swelling better.  - Continue same treatment    Orders:  -     CBC Auto Differential; Future; Expected date: 05/06/2025  -     Comprehensive Metabolic Panel; Future; Expected date: 05/06/2025  -     Lipid Panel; Future; Expected date: 05/06/2025    2. Paroxysmal atrial fibrillation  Overview:  On Coumadin 5 mg day    Assessment & Plan:  - Most recent warfarin level  slightly below target range but clinically acceptable.  - Continue current dosing.  - Stable heart rate and blood pressure.  - Followed by Pharmacy Warfarin clinic.        3. Stage 3b chronic kidney disease  Overview:  On valsartan 320mg day    Assessment & Plan:  - Kidney function appears improved after discontinuing the diuretic that was causing dehydration.  - Laboratory results show good kidney function, potassium, calcium, and liver function.  - Sola's condition assessed as CKD3A.  - Will follow up on kidney function at next visit.  - Continue vitamin D supplementation at current dose.  - Ordered PTH and vitamin D levels.    Orders:  -     Comprehensive Metabolic Panel; Future; Expected date: 05/06/2025  -     PTH, Intact; Future; Expected date: 05/06/2025    4. Hypothyroidism, unspecified type  Overview:  On levothyroxine 75 mcg daily    Assessment & Plan:  - Current thyroid medication regimen is appropriate; continue as prescribed.    Orders:  -     TSH; Future; Expected date: 05/06/2025  -     T4, Free; Future; Expected date: 05/06/2025    5. Vitamin D deficiency  Overview:  Receiving vitamin-D    Assessment & Plan:  - PTH levels slightly elevated, consistent with chronic kidney disease.  - Explained role of PTH in calcium regulation and bone health in context of CKD.  - Will follow PTH levels and consult an endocrinologist electronically if elevation persists.  - Last vit D was improved.  - Recommend incorporating calcium-rich foods in diet (milk, yogurt, calcium-fortified orange juice).    Orders:  -     Vitamin D; Future; Expected date: 05/06/2025    6. Age-related osteoporosis with current pathological fracture, sequela  Overview:  DEXA in 12/31/24-  Impression: *Severe osteoporosis based on T-score below -2.5 with fragility fracture  *Fracture risk is very high due to recent fracture and T-score below -3.0.    Assessment & Plan:  - Renal function better and vit D looks good.  - Not interested in  adding treatments at this time and will continue vit d and keeping good source of calcium in diet.  - Reevaluate on next visit.      7. Neuropathy  Overview:  Continue home gabapentin     Assessment & Plan:  Feels the Gabapentin was helping and only has occasional tingling.            ## FOLLOW-UP:  - Return in 6 months.  - Contact the office if any concerns arise before the next appointment.        Health Maintenance Due   Topic Date Due    RSV Vaccine (Age 60+ and Pregnant patients) (1 - 1-dose 75+ series) Never done        I spent a total of 35 minutes on the day of the visit.This includes face to face time and non-face to face time preparing to see the patient (eg, review of tests), obtaining and/or reviewing separately obtained history, documenting clinical information in the electronic or other health record, independently interpreting results and communicating results to the patient/family/caregiver, or care coordinator.     RETURN TO CLINIC IN: 6 MONTHS    FOR NEXT VISIT: REVIEW LABS, MEDICATION MONITORING, and Osteoporosis treatment.       Rosy Rivera MD  [facility]  Disclaimer:  This note has been generated using voice-recognition software. There may be grammatical or spelling errors that have been missed during proof-reading           [1]   Current Outpatient Medications   Medication Sig Dispense Refill    gabapentin (NEURONTIN) 100 MG capsule Take 1 capsule (100 mg total) by mouth every evening. 90 capsule 3    hydrALAZINE (APRESOLINE) 50 MG tablet Take 1 tablet by mouth twice daily 180 tablet 0    levothyroxine (SYNTHROID) 75 MCG tablet TAKE 1 TABLET BY MOUTH BEFORE BREAKFAST 90 tablet 0    metoprolol succinate (TOPROL-XL) 100 MG 24 hr tablet Take 1 tablet by mouth twice daily 180 tablet 0    valsartan (DIOVAN) 320 MG tablet Take 1 tablet (320 mg total) by mouth once daily. 90 tablet 3    vitamin D (VITAMIN D3) 1000 units Tab Take 2,000 Units by mouth once daily.      warfarin (COUMADIN) 5 MG  tablet Take 1 tablet by mouth once daily 90 tablet 0     No current facility-administered medications for this visit.

## 2025-05-07 NOTE — ASSESSMENT & PLAN NOTE
- Most recent warfarin level slightly below target range but clinically acceptable.  - Continue current dosing.  - Stable heart rate and blood pressure.  - Followed by Pharmacy Warfarin clinic.

## 2025-05-07 NOTE — ASSESSMENT & PLAN NOTE
- Blood pressure is well-controlled and within target range.  - Leg swelling better.  - Continue same treatment

## 2025-05-07 NOTE — ASSESSMENT & PLAN NOTE
- Renal function better and vit D looks good.  - Not interested in adding treatments at this time and will continue vit d and keeping good source of calcium in diet.  - Reevaluate on next visit.

## 2025-05-07 NOTE — ASSESSMENT & PLAN NOTE
- PTH levels slightly elevated, consistent with chronic kidney disease.  - Explained role of PTH in calcium regulation and bone health in context of CKD.  - Will follow PTH levels and consult an endocrinologist electronically if elevation persists.  - Last vit D was improved.  - Recommend incorporating calcium-rich foods in diet (milk, yogurt, calcium-fortified orange juice).

## 2025-05-07 NOTE — ASSESSMENT & PLAN NOTE
- Kidney function appears improved after discontinuing the diuretic that was causing dehydration.  - Laboratory results show good kidney function, potassium, calcium, and liver function.  - Sola's condition assessed as CKD3A.  - Will follow up on kidney function at next visit.  - Continue vitamin D supplementation at current dose.  - Ordered PTH and vitamin D levels.

## 2025-05-12 ENCOUNTER — ANTI-COAG VISIT (OUTPATIENT)
Dept: CARDIOLOGY | Facility: CLINIC | Age: 89
End: 2025-05-12
Payer: MEDICARE

## 2025-05-12 ENCOUNTER — LAB VISIT (OUTPATIENT)
Dept: LAB | Facility: HOSPITAL | Age: 89
End: 2025-05-12
Attending: INTERNAL MEDICINE
Payer: MEDICARE

## 2025-05-12 ENCOUNTER — PATIENT MESSAGE (OUTPATIENT)
Dept: CARDIOLOGY | Facility: CLINIC | Age: 89
End: 2025-05-12

## 2025-05-12 DIAGNOSIS — N18.30 STAGE 3 CHRONIC KIDNEY DISEASE, UNSPECIFIED WHETHER STAGE 3A OR 3B CKD: ICD-10-CM

## 2025-05-12 DIAGNOSIS — Z79.01 CHRONIC ANTICOAGULATION: ICD-10-CM

## 2025-05-12 DIAGNOSIS — D64.9 CHRONIC ANEMIA: ICD-10-CM

## 2025-05-12 DIAGNOSIS — I48.0 PAROXYSMAL ATRIAL FIBRILLATION: Primary | ICD-10-CM

## 2025-05-12 DIAGNOSIS — I48.0 PAROXYSMAL ATRIAL FIBRILLATION: ICD-10-CM

## 2025-05-12 LAB
ABSOLUTE EOSINOPHIL (OHS): 0.14 K/UL
ABSOLUTE MONOCYTE (OHS): 0.63 K/UL (ref 0.3–1)
ABSOLUTE NEUTROPHIL COUNT (OHS): 3.78 K/UL (ref 1.8–7.7)
BASOPHILS # BLD AUTO: 0.07 K/UL
BASOPHILS NFR BLD AUTO: 1.1 %
ERYTHROCYTE [DISTWIDTH] IN BLOOD BY AUTOMATED COUNT: 14.1 % (ref 11.5–14.5)
HCT VFR BLD AUTO: 38.3 % (ref 37–48.5)
HGB BLD-MCNC: 12.3 GM/DL (ref 12–16)
IMM GRANULOCYTES # BLD AUTO: 0.02 K/UL (ref 0–0.04)
IMM GRANULOCYTES NFR BLD AUTO: 0.3 % (ref 0–0.5)
INR PPP: 2 (ref 0.8–1.2)
LYMPHOCYTES # BLD AUTO: 2.02 K/UL (ref 1–4.8)
MCH RBC QN AUTO: 30.3 PG (ref 27–31)
MCHC RBC AUTO-ENTMCNC: 32.1 G/DL (ref 32–36)
MCV RBC AUTO: 94 FL (ref 82–98)
NUCLEATED RBC (/100WBC) (OHS): 0 /100 WBC
PLATELET # BLD AUTO: 263 K/UL (ref 150–450)
PMV BLD AUTO: 9.9 FL (ref 9.2–12.9)
PROTHROMBIN TIME: 20.7 SECONDS (ref 9–12.5)
RBC # BLD AUTO: 4.06 M/UL (ref 4–5.4)
RELATIVE EOSINOPHIL (OHS): 2.1 %
RELATIVE LYMPHOCYTE (OHS): 30.3 % (ref 18–48)
RELATIVE MONOCYTE (OHS): 9.5 % (ref 4–15)
RELATIVE NEUTROPHIL (OHS): 56.7 % (ref 38–73)
WBC # BLD AUTO: 6.66 K/UL (ref 3.9–12.7)

## 2025-05-12 PROCEDURE — 85025 COMPLETE CBC W/AUTO DIFF WBC: CPT

## 2025-05-12 PROCEDURE — 93793 ANTICOAG MGMT PT WARFARIN: CPT | Mod: ,,, | Performed by: PHARMACIST

## 2025-05-12 PROCEDURE — 85610 PROTHROMBIN TIME: CPT

## 2025-05-12 PROCEDURE — 36415 COLL VENOUS BLD VENIPUNCTURE: CPT | Mod: PO

## 2025-05-26 ENCOUNTER — LAB VISIT (OUTPATIENT)
Dept: LAB | Facility: HOSPITAL | Age: 89
End: 2025-05-26
Attending: INTERNAL MEDICINE
Payer: MEDICARE

## 2025-05-26 DIAGNOSIS — Z79.01 CHRONIC ANTICOAGULATION: ICD-10-CM

## 2025-05-26 DIAGNOSIS — I48.0 PAROXYSMAL ATRIAL FIBRILLATION: ICD-10-CM

## 2025-05-26 LAB
INR PPP: 1.8 (ref 0.8–1.2)
PROTHROMBIN TIME: 18.9 SECONDS (ref 9–12.5)

## 2025-05-26 PROCEDURE — 36415 COLL VENOUS BLD VENIPUNCTURE: CPT | Mod: PO

## 2025-05-26 PROCEDURE — 85610 PROTHROMBIN TIME: CPT

## 2025-05-27 ENCOUNTER — ANTI-COAG VISIT (OUTPATIENT)
Dept: CARDIOLOGY | Facility: CLINIC | Age: 89
End: 2025-05-27
Payer: MEDICARE

## 2025-05-27 DIAGNOSIS — I48.0 PAROXYSMAL ATRIAL FIBRILLATION: Primary | ICD-10-CM

## 2025-05-27 PROCEDURE — 93793 ANTICOAG MGMT PT WARFARIN: CPT | Mod: ,,,

## 2025-05-27 NOTE — PROGRESS NOTES
[facility]    2025 8:21 AM    Assessment/Plan:    Patient presents today with subtherapeutic  INR.    Assessment of patient findings and chart review: Reviewed    Recommendation for patient's warfarin regimen: Boost dose today to 7.5mg then resume current maintenance dose    Recommend repeat INR in 2 weeks  _________________________________________________________________    Sola Pitt (89 y.o.) is followed by the [facility].    Anticoagulation Summary  As of 2025      INR goal:  2.0-3.0   TTR:  64.3% (9.3 mo)   INR used for dosin.8 (2025)   Warfarin maintenance plan:  2.5 mg (5 mg x 0.5) every Mon, Thu, Sat; 5 mg (5 mg x 1) all other days   Weekly warfarin total:  27.5 mg   Plan last modified:  Britton Sweet, PharmD (2025)   Next INR check:  2025   Target end date:  --    Indications    Paroxysmal atrial fibrillation [I48.0]                 Anticoagulation Episode Summary       INR check location:  Anticoagulation Clinic    Preferred lab:  --    Send INR reminders to:  [facility] COUMADIN MONITORING POOL    Comments:  [facility]// pt not interested in meter          Anticoagulation Care Providers       Provider Role Specialty Phone number    Rosy Rivera MD [facility] [phone]

## 2025-06-09 ENCOUNTER — LAB VISIT (OUTPATIENT)
Dept: LAB | Facility: HOSPITAL | Age: 89
End: 2025-06-09
Attending: INTERNAL MEDICINE
Payer: MEDICARE

## 2025-06-09 ENCOUNTER — ANTI-COAG VISIT (OUTPATIENT)
Dept: CARDIOLOGY | Facility: CLINIC | Age: 89
End: 2025-06-09
Payer: MEDICARE

## 2025-06-09 DIAGNOSIS — I48.0 PAROXYSMAL ATRIAL FIBRILLATION: Primary | ICD-10-CM

## 2025-06-09 DIAGNOSIS — Z79.01 CHRONIC ANTICOAGULATION: ICD-10-CM

## 2025-06-09 DIAGNOSIS — I48.0 PAROXYSMAL ATRIAL FIBRILLATION: ICD-10-CM

## 2025-06-09 LAB
INR PPP: 2.2 (ref 0.8–1.2)
PROTHROMBIN TIME: 22.4 SECONDS (ref 9–12.5)

## 2025-06-09 PROCEDURE — 93793 ANTICOAG MGMT PT WARFARIN: CPT | Mod: ,,,

## 2025-06-09 PROCEDURE — 36415 COLL VENOUS BLD VENIPUNCTURE: CPT | Mod: PO

## 2025-06-09 PROCEDURE — 85610 PROTHROMBIN TIME: CPT

## 2025-06-09 NOTE — PROGRESS NOTES
[facility]    2025 4:04 PM    Assessment/Plan:    Patient presents today with therapeutic INR.    Assessment of patient findings and chart review: Reviewed    Recommendation for patient's warfarin regimen: Continue current maintenance dose    Recommend repeat INR in 2 weeks  _________________________________________________________________    Sola Pitt (89 y.o.) is followed by the [facility].    Anticoagulation Summary  As of 2025      INR goal:  2.0-3.0   TTR:  63.6% (9.7 mo)   INR used for dosin.2 (2025)   Warfarin maintenance plan:  2.5 mg (5 mg x 0.5) every Mon, Thu, Sat; 5 mg (5 mg x 1) all other days   Weekly warfarin total:  27.5 mg   Plan last modified:  Britton Sweet, PharmD (2025)   Next INR check:  2025   Target end date:  --    Indications    Paroxysmal atrial fibrillation [I48.0]                 Anticoagulation Episode Summary       INR check location:  Anticoagulation Clinic    Preferred lab:  --    Send INR reminders to:  [facility] COUMADIN MONITORING POOL    Comments:  [facility]// pt not interested in meter          Anticoagulation Care Providers       Provider Role Specialty Phone number    Rosy Rivera MD [facility] [phone]

## 2025-06-12 RX ORDER — METOPROLOL SUCCINATE 100 MG/1
100 TABLET, EXTENDED RELEASE ORAL 2 TIMES DAILY
Qty: 180 TABLET | Refills: 0 | Status: SHIPPED | OUTPATIENT
Start: 2025-06-12 | End: 2025-06-12

## 2025-06-12 RX ORDER — METOPROLOL SUCCINATE 100 MG/1
100 TABLET, EXTENDED RELEASE ORAL 2 TIMES DAILY
Qty: 180 TABLET | Refills: 1 | Status: SHIPPED | OUTPATIENT
Start: 2025-06-12

## 2025-06-13 ENCOUNTER — EXTERNAL HOME HEALTH (OUTPATIENT)
Dept: HOME HEALTH SERVICES | Facility: HOSPITAL | Age: 89
End: 2025-06-13
Payer: MEDICARE

## 2025-06-17 DIAGNOSIS — I10 PRIMARY HYPERTENSION: ICD-10-CM

## 2025-06-17 RX ORDER — LEVOTHYROXINE SODIUM 75 UG/1
75 TABLET ORAL
Qty: 90 TABLET | Refills: 0 | Status: SHIPPED | OUTPATIENT
Start: 2025-06-17

## 2025-06-17 RX ORDER — HYDRALAZINE HYDROCHLORIDE 50 MG/1
50 TABLET, FILM COATED ORAL 2 TIMES DAILY
Qty: 180 TABLET | Refills: 0 | Status: SHIPPED | OUTPATIENT
Start: 2025-06-17

## 2025-06-25 ENCOUNTER — ANTI-COAG VISIT (OUTPATIENT)
Dept: CARDIOLOGY | Facility: CLINIC | Age: 89
End: 2025-06-25
Payer: MEDICARE

## 2025-06-25 ENCOUNTER — LAB VISIT (OUTPATIENT)
Dept: LAB | Facility: HOSPITAL | Age: 89
End: 2025-06-25
Attending: INTERNAL MEDICINE
Payer: MEDICARE

## 2025-06-25 DIAGNOSIS — I48.0 PAROXYSMAL ATRIAL FIBRILLATION: ICD-10-CM

## 2025-06-25 DIAGNOSIS — Z79.01 CHRONIC ANTICOAGULATION: ICD-10-CM

## 2025-06-25 DIAGNOSIS — I48.0 PAROXYSMAL ATRIAL FIBRILLATION: Primary | ICD-10-CM

## 2025-06-25 LAB
INR PPP: 2.1 (ref 0.8–1.2)
PROTHROMBIN TIME: 22.2 SECONDS (ref 9–12.5)

## 2025-06-25 PROCEDURE — 36415 COLL VENOUS BLD VENIPUNCTURE: CPT | Mod: PO

## 2025-06-25 PROCEDURE — 93793 ANTICOAG MGMT PT WARFARIN: CPT | Mod: ,,,

## 2025-06-25 PROCEDURE — 85610 PROTHROMBIN TIME: CPT

## 2025-06-25 NOTE — PROGRESS NOTES
[facility]    2025 4:01 PM    Assessment/Plan:    Patient presents today with therapeutic INR.    Recommendation for patient's warfarin regimen: Continue current maintenance dose    Recommend repeat INR in 3 weeks  _________________________________________________________________    Sola Pitt (89 y.o.) is followed by the [facility].    Anticoagulation Summary  As of 2025      INR goal:  2.0-3.0   TTR:  65.5% (10.3 mo)   INR used for dosin.1 (2025)   Warfarin maintenance plan:  2.5 mg (5 mg x 0.5) every Mon, Thu, Sat; 5 mg (5 mg x 1) all other days   Weekly warfarin total:  27.5 mg   No change documented:  Trinh Cordero, PharmD   Plan last modified:  Britton Sweet, PharmD (2025)   Next INR check:  2025   Target end date:  --    Indications    Paroxysmal atrial fibrillation [I48.0]                 Anticoagulation Episode Summary       INR check location:  [facility]    Preferred lab:  --    Send INR reminders to:  [facility] COUMADIN MONITORING POOL    Comments:  [facility]// pt not interested in meter          Anticoagulation Care Providers       Provider Role Specialty Phone number    Rosy Rivera MD [facility] [phone]

## 2025-07-16 ENCOUNTER — LAB VISIT (OUTPATIENT)
Dept: LAB | Facility: HOSPITAL | Age: 89
End: 2025-07-16
Attending: INTERNAL MEDICINE
Payer: MEDICARE

## 2025-07-16 ENCOUNTER — PATIENT MESSAGE (OUTPATIENT)
Dept: CARDIOLOGY | Facility: CLINIC | Age: 89
End: 2025-07-16

## 2025-07-16 ENCOUNTER — ANTI-COAG VISIT (OUTPATIENT)
Dept: CARDIOLOGY | Facility: CLINIC | Age: 89
End: 2025-07-16
Payer: MEDICARE

## 2025-07-16 DIAGNOSIS — I48.0 PAROXYSMAL ATRIAL FIBRILLATION: ICD-10-CM

## 2025-07-16 DIAGNOSIS — I48.0 PAROXYSMAL ATRIAL FIBRILLATION: Primary | ICD-10-CM

## 2025-07-16 DIAGNOSIS — Z79.01 CHRONIC ANTICOAGULATION: ICD-10-CM

## 2025-07-16 LAB
INR PPP: 2 (ref 0.8–1.2)
PROTHROMBIN TIME: 20.8 SECONDS (ref 9–12.5)

## 2025-07-16 PROCEDURE — 93793 ANTICOAG MGMT PT WARFARIN: CPT | Mod: ,,,

## 2025-07-16 PROCEDURE — 85610 PROTHROMBIN TIME: CPT

## 2025-07-16 PROCEDURE — 36415 COLL VENOUS BLD VENIPUNCTURE: CPT | Mod: PO

## 2025-07-16 NOTE — PROGRESS NOTES
[facility]    2025 4:18 PM    Assessment/Plan:    Patient presents today with therapeutic INR.    Recommendation for patient's warfarin regimen: Continue current maintenance dose    Recommend repeat INR in 4 weeks  _________________________________________________________________    Sola Pitt (89 y.o.) is followed by the [facility].    Anticoagulation Summary  As of 2025      INR goal:  2.0-3.0   TTR:  67.7% (11 mo)   INR used for dosin.0 (2025)   Warfarin maintenance plan:  2.5 mg (5 mg x 0.5) every Mon, Thu, Sat; 5 mg (5 mg x 1) all other days   Weekly warfarin total:  27.5 mg   Plan last modified:  Britton Sweet, PharmD (2025)   Next INR check:  2025   Target end date:  --    Indications    Paroxysmal atrial fibrillation [I48.0]                 Anticoagulation Episode Summary       INR check location:  Anticoagulation Clinic    Preferred lab:  --    Send INR reminders to:  [facility] COUMADIN MONITORING POOL    Comments:  [facility]// pt not interested in meter          Anticoagulation Care Providers       Provider Role Specialty Phone number    Rosy Rivera MD [facility] [phone]

## 2025-07-18 RX ORDER — WARFARIN SODIUM 5 MG/1
5 TABLET ORAL
Qty: 90 TABLET | Refills: 0 | Status: SHIPPED | OUTPATIENT
Start: 2025-07-18

## 2025-08-13 ENCOUNTER — ANTI-COAG VISIT (OUTPATIENT)
Dept: CARDIOLOGY | Facility: CLINIC | Age: 89
End: 2025-08-13
Payer: MEDICARE

## 2025-08-13 ENCOUNTER — LAB VISIT (OUTPATIENT)
Dept: LAB | Facility: HOSPITAL | Age: 89
End: 2025-08-13
Attending: INTERNAL MEDICINE
Payer: MEDICARE

## 2025-08-13 DIAGNOSIS — Z79.01 CHRONIC ANTICOAGULATION: ICD-10-CM

## 2025-08-13 DIAGNOSIS — I48.0 PAROXYSMAL ATRIAL FIBRILLATION: ICD-10-CM

## 2025-08-13 DIAGNOSIS — I48.0 PAROXYSMAL ATRIAL FIBRILLATION: Primary | ICD-10-CM

## 2025-08-13 LAB
INR PPP: 2.3 (ref 0.8–1.2)
PROTHROMBIN TIME: 24 SECONDS (ref 9–12.5)

## 2025-08-13 PROCEDURE — 36415 COLL VENOUS BLD VENIPUNCTURE: CPT | Mod: PO

## 2025-08-13 PROCEDURE — 85610 PROTHROMBIN TIME: CPT

## 2025-08-13 PROCEDURE — 93793 ANTICOAG MGMT PT WARFARIN: CPT | Mod: ,,,
